# Patient Record
Sex: FEMALE | Race: WHITE | Employment: OTHER | URBAN - METROPOLITAN AREA
[De-identification: names, ages, dates, MRNs, and addresses within clinical notes are randomized per-mention and may not be internally consistent; named-entity substitution may affect disease eponyms.]

---

## 2017-02-20 RX ORDER — NAPROXEN 500 MG/1
TABLET ORAL
Qty: 60 TABLET | Refills: 5 | Status: SHIPPED | OUTPATIENT
Start: 2017-02-20 | End: 2017-02-22 | Stop reason: SDUPTHER

## 2017-02-22 ENCOUNTER — OFFICE VISIT (OUTPATIENT)
Dept: PRIMARY CARE CLINIC | Age: 67
End: 2017-02-22

## 2017-02-22 VITALS
TEMPERATURE: 97.7 F | SYSTOLIC BLOOD PRESSURE: 132 MMHG | RESPIRATION RATE: 14 BRPM | DIASTOLIC BLOOD PRESSURE: 70 MMHG | WEIGHT: 163.7 LBS | HEIGHT: 64 IN | BODY MASS INDEX: 27.95 KG/M2 | HEART RATE: 76 BPM

## 2017-02-22 DIAGNOSIS — M13.0 ARTHRITIS OF MULTIPLE SITES: ICD-10-CM

## 2017-02-22 DIAGNOSIS — F40.243 FEAR OF FLYING: ICD-10-CM

## 2017-02-22 DIAGNOSIS — J00 ACUTE NASOPHARYNGITIS: Primary | ICD-10-CM

## 2017-02-22 PROCEDURE — 3014F SCREEN MAMMO DOC REV: CPT | Performed by: INTERNAL MEDICINE

## 2017-02-22 PROCEDURE — 4040F PNEUMOC VAC/ADMIN/RCVD: CPT | Performed by: INTERNAL MEDICINE

## 2017-02-22 PROCEDURE — G8399 PT W/DXA RESULTS DOCUMENT: HCPCS | Performed by: INTERNAL MEDICINE

## 2017-02-22 PROCEDURE — G8420 CALC BMI NORM PARAMETERS: HCPCS | Performed by: INTERNAL MEDICINE

## 2017-02-22 PROCEDURE — 3017F COLORECTAL CA SCREEN DOC REV: CPT | Performed by: INTERNAL MEDICINE

## 2017-02-22 PROCEDURE — G8427 DOCREV CUR MEDS BY ELIG CLIN: HCPCS | Performed by: INTERNAL MEDICINE

## 2017-02-22 PROCEDURE — 1090F PRES/ABSN URINE INCON ASSESS: CPT | Performed by: INTERNAL MEDICINE

## 2017-02-22 PROCEDURE — 1036F TOBACCO NON-USER: CPT | Performed by: INTERNAL MEDICINE

## 2017-02-22 PROCEDURE — 99213 OFFICE O/P EST LOW 20 MIN: CPT | Performed by: INTERNAL MEDICINE

## 2017-02-22 PROCEDURE — G8484 FLU IMMUNIZE NO ADMIN: HCPCS | Performed by: INTERNAL MEDICINE

## 2017-02-22 PROCEDURE — 1123F ACP DISCUSS/DSCN MKR DOCD: CPT | Performed by: INTERNAL MEDICINE

## 2017-02-22 RX ORDER — AMOXICILLIN AND CLAVULANATE POTASSIUM 875; 125 MG/1; MG/1
1 TABLET, FILM COATED ORAL 2 TIMES DAILY
Qty: 20 TABLET | Refills: 1 | Status: SHIPPED | OUTPATIENT
Start: 2017-02-22 | End: 2018-10-19 | Stop reason: SDUPTHER

## 2017-02-22 RX ORDER — LORAZEPAM 0.5 MG/1
0.5 TABLET ORAL EVERY 6 HOURS PRN
Qty: 20 TABLET | Refills: 0 | Status: SHIPPED | OUTPATIENT
Start: 2017-02-22 | End: 2018-03-08 | Stop reason: ALTCHOICE

## 2017-02-22 RX ORDER — NAPROXEN 500 MG/1
TABLET ORAL
Qty: 60 TABLET | Refills: 5 | Status: SHIPPED | OUTPATIENT
Start: 2017-02-22 | End: 2018-04-03 | Stop reason: SDUPTHER

## 2017-03-05 ASSESSMENT — ENCOUNTER SYMPTOMS
SINUS PAIN: 1
ABDOMINAL PAIN: 0
COUGH: 0
BLOOD IN STOOL: 0
PHOTOPHOBIA: 0
CHOKING: 0
APNEA: 0
FACIAL SWELLING: 0
RHINORRHEA: 1
ABDOMINAL DISTENTION: 0

## 2017-06-12 ENCOUNTER — OFFICE VISIT (OUTPATIENT)
Dept: PRIMARY CARE CLINIC | Age: 67
End: 2017-06-12

## 2017-06-12 VITALS
BODY MASS INDEX: 26.98 KG/M2 | OXYGEN SATURATION: 97 % | HEIGHT: 64 IN | HEART RATE: 60 BPM | WEIGHT: 158 LBS | DIASTOLIC BLOOD PRESSURE: 60 MMHG | RESPIRATION RATE: 14 BRPM | TEMPERATURE: 97.7 F | SYSTOLIC BLOOD PRESSURE: 110 MMHG

## 2017-06-12 DIAGNOSIS — R35.0 FREQUENT URINATION: ICD-10-CM

## 2017-06-12 DIAGNOSIS — R10.9 LEFT SIDED ABDOMINAL PAIN: ICD-10-CM

## 2017-06-12 DIAGNOSIS — M54.50 ACUTE BILATERAL LOW BACK PAIN WITHOUT SCIATICA: Primary | ICD-10-CM

## 2017-06-12 DIAGNOSIS — K80.21 CALCULUS OF GALLBLADDER WITH BILIARY OBSTRUCTION BUT WITHOUT CHOLECYSTITIS: ICD-10-CM

## 2017-06-12 LAB
ALBUMIN SERPL-MCNC: 4.1 G/DL (ref 3.9–4.9)
ALP BLD-CCNC: 89 U/L (ref 40–130)
ALT SERPL-CCNC: 14 U/L (ref 0–33)
AMYLASE: 72 U/L (ref 28–100)
ANION GAP SERPL CALCULATED.3IONS-SCNC: 12 MEQ/L (ref 7–13)
AST SERPL-CCNC: 12 U/L (ref 0–35)
BASOPHILS ABSOLUTE: 0.1 K/UL (ref 0–0.2)
BASOPHILS RELATIVE PERCENT: 1 %
BILIRUB SERPL-MCNC: 0.4 MG/DL (ref 0–1.2)
BILIRUBIN, POC: NORMAL
BLOOD URINE, POC: NORMAL
BUN BLDV-MCNC: 29 MG/DL (ref 8–23)
CALCIUM SERPL-MCNC: 10.8 MG/DL (ref 8.6–10.2)
CHLORIDE BLD-SCNC: 102 MEQ/L (ref 98–107)
CLARITY, POC: CLEAR
CO2: 28 MEQ/L (ref 22–29)
COLOR, POC: NORMAL
CREAT SERPL-MCNC: 0.72 MG/DL (ref 0.5–0.9)
EOSINOPHILS ABSOLUTE: 0.7 K/UL (ref 0–0.7)
EOSINOPHILS RELATIVE PERCENT: 6 %
GFR AFRICAN AMERICAN: >60
GFR NON-AFRICAN AMERICAN: >60
GLOBULIN: 2.3 G/DL (ref 2.3–3.5)
GLUCOSE BLD-MCNC: 71 MG/DL (ref 74–109)
GLUCOSE URINE, POC: NORMAL
HCT VFR BLD CALC: 45.7 % (ref 37–47)
HEMOGLOBIN: 15.3 G/DL (ref 12–16)
KETONES, POC: NORMAL
LEUKOCYTE EST, POC: NORMAL
LIPASE: 35 U/L (ref 13–60)
LYMPHOCYTES ABSOLUTE: 2.3 K/UL (ref 1–4.8)
LYMPHOCYTES RELATIVE PERCENT: 21.1 %
MCH RBC QN AUTO: 29.4 PG (ref 27–31.3)
MCHC RBC AUTO-ENTMCNC: 33.6 % (ref 33–37)
MCV RBC AUTO: 87.5 FL (ref 82–100)
MONOCYTES ABSOLUTE: 0.8 K/UL (ref 0.2–0.8)
MONOCYTES RELATIVE PERCENT: 7.3 %
NEUTROPHILS ABSOLUTE: 7.2 K/UL (ref 1.4–6.5)
NEUTROPHILS RELATIVE PERCENT: 64.6 %
NITRITE, POC: NORMAL
PDW BLD-RTO: 13.4 % (ref 11.5–14.5)
PH, POC: 6
PLATELET # BLD: 291 K/UL (ref 130–400)
POTASSIUM SERPL-SCNC: 4.6 MEQ/L (ref 3.5–5.1)
PROTEIN, POC: NORMAL
RBC # BLD: 5.22 M/UL (ref 4.2–5.4)
SODIUM BLD-SCNC: 142 MEQ/L (ref 132–144)
SPECIFIC GRAVITY, POC: 1.03
TOTAL PROTEIN: 6.4 G/DL (ref 6.4–8.1)
UROBILINOGEN, POC: NORMAL
WBC # BLD: 11.1 K/UL (ref 4.8–10.8)

## 2017-06-12 PROCEDURE — 81003 URINALYSIS AUTO W/O SCOPE: CPT | Performed by: INTERNAL MEDICINE

## 2017-06-12 PROCEDURE — 3014F SCREEN MAMMO DOC REV: CPT | Performed by: INTERNAL MEDICINE

## 2017-06-12 PROCEDURE — 99214 OFFICE O/P EST MOD 30 MIN: CPT | Performed by: INTERNAL MEDICINE

## 2017-06-12 PROCEDURE — G8419 CALC BMI OUT NRM PARAM NOF/U: HCPCS | Performed by: INTERNAL MEDICINE

## 2017-06-12 PROCEDURE — 4040F PNEUMOC VAC/ADMIN/RCVD: CPT | Performed by: INTERNAL MEDICINE

## 2017-06-12 PROCEDURE — 3017F COLORECTAL CA SCREEN DOC REV: CPT | Performed by: INTERNAL MEDICINE

## 2017-06-12 PROCEDURE — 1036F TOBACCO NON-USER: CPT | Performed by: INTERNAL MEDICINE

## 2017-06-12 PROCEDURE — G8399 PT W/DXA RESULTS DOCUMENT: HCPCS | Performed by: INTERNAL MEDICINE

## 2017-06-12 PROCEDURE — G8427 DOCREV CUR MEDS BY ELIG CLIN: HCPCS | Performed by: INTERNAL MEDICINE

## 2017-06-12 PROCEDURE — 1090F PRES/ABSN URINE INCON ASSESS: CPT | Performed by: INTERNAL MEDICINE

## 2017-06-12 PROCEDURE — 1123F ACP DISCUSS/DSCN MKR DOCD: CPT | Performed by: INTERNAL MEDICINE

## 2017-06-12 ASSESSMENT — PATIENT HEALTH QUESTIONNAIRE - PHQ9
SUM OF ALL RESPONSES TO PHQ QUESTIONS 1-9: 0
2. FEELING DOWN, DEPRESSED OR HOPELESS: 0
1. LITTLE INTEREST OR PLEASURE IN DOING THINGS: 0
SUM OF ALL RESPONSES TO PHQ9 QUESTIONS 1 & 2: 0

## 2017-06-13 ENCOUNTER — TELEPHONE (OUTPATIENT)
Dept: PRIMARY CARE CLINIC | Age: 67
End: 2017-06-13

## 2017-06-18 ASSESSMENT — ENCOUNTER SYMPTOMS
PHOTOPHOBIA: 0
ABDOMINAL PAIN: 1
FACIAL SWELLING: 0
CHOKING: 0
APNEA: 0
ABDOMINAL DISTENTION: 0
BLOOD IN STOOL: 0
BACK PAIN: 1

## 2017-06-19 ENCOUNTER — HOSPITAL ENCOUNTER (OUTPATIENT)
Dept: ULTRASOUND IMAGING | Age: 67
Discharge: HOME | End: 2017-06-19
Payer: MEDICARE

## 2017-06-19 ENCOUNTER — HOSPITAL ENCOUNTER (OUTPATIENT)
Dept: CT IMAGING | Age: 67
Discharge: HOME | End: 2017-06-19
Payer: MEDICARE

## 2017-06-19 VITALS — WEIGHT: 158 LBS | BODY MASS INDEX: 27.12 KG/M2

## 2017-06-19 DIAGNOSIS — R10.9 LEFT SIDED ABDOMINAL PAIN: ICD-10-CM

## 2017-06-19 DIAGNOSIS — R35.0 FREQUENT URINATION: ICD-10-CM

## 2017-06-19 DIAGNOSIS — K80.21 CALCULUS OF GALLBLADDER WITH BILIARY OBSTRUCTION BUT WITHOUT CHOLECYSTITIS: ICD-10-CM

## 2017-06-19 PROCEDURE — 74176 CT ABD & PELVIS W/O CONTRAST: CPT

## 2017-06-19 PROCEDURE — 76705 ECHO EXAM OF ABDOMEN: CPT

## 2017-06-20 DIAGNOSIS — D35.01 ADRENAL ADENOMA, RIGHT: Primary | ICD-10-CM

## 2017-06-20 DIAGNOSIS — K40.90 INGUINAL HERNIA WITHOUT OBSTRUCTION OR GANGRENE, RECURRENCE NOT SPECIFIED, UNSPECIFIED LATERALITY: ICD-10-CM

## 2017-06-20 DIAGNOSIS — K76.89 LIVER CYST: Primary | ICD-10-CM

## 2017-07-10 DIAGNOSIS — I10 ESSENTIAL HYPERTENSION: ICD-10-CM

## 2017-07-10 RX ORDER — CARVEDILOL 25 MG/1
TABLET ORAL
Qty: 180 TABLET | Refills: 2 | Status: SHIPPED | OUTPATIENT
Start: 2017-07-10 | End: 2018-04-03 | Stop reason: SDUPTHER

## 2017-07-10 RX ORDER — LOSARTAN POTASSIUM AND HYDROCHLOROTHIAZIDE 12.5; 5 MG/1; MG/1
TABLET ORAL
Qty: 90 TABLET | Refills: 1 | Status: SHIPPED | OUTPATIENT
Start: 2017-07-10 | End: 2018-03-12 | Stop reason: SDUPTHER

## 2017-07-13 ENCOUNTER — OFFICE VISIT (OUTPATIENT)
Dept: SURGERY | Age: 67
End: 2017-07-13

## 2017-07-13 VITALS
HEART RATE: 74 BPM | TEMPERATURE: 98.4 F | SYSTOLIC BLOOD PRESSURE: 110 MMHG | BODY MASS INDEX: 27.05 KG/M2 | RESPIRATION RATE: 16 BRPM | WEIGHT: 157.6 LBS | DIASTOLIC BLOOD PRESSURE: 72 MMHG

## 2017-07-13 DIAGNOSIS — K40.20 BILATERAL INGUINAL HERNIA WITHOUT OBSTRUCTION OR GANGRENE, RECURRENCE NOT SPECIFIED: Primary | ICD-10-CM

## 2017-07-13 PROCEDURE — 3017F COLORECTAL CA SCREEN DOC REV: CPT | Performed by: SURGERY

## 2017-07-13 PROCEDURE — 4040F PNEUMOC VAC/ADMIN/RCVD: CPT | Performed by: SURGERY

## 2017-07-13 PROCEDURE — G8399 PT W/DXA RESULTS DOCUMENT: HCPCS | Performed by: SURGERY

## 2017-07-13 PROCEDURE — 1090F PRES/ABSN URINE INCON ASSESS: CPT | Performed by: SURGERY

## 2017-07-13 PROCEDURE — 99202 OFFICE O/P NEW SF 15 MIN: CPT | Performed by: SURGERY

## 2017-07-13 PROCEDURE — G8427 DOCREV CUR MEDS BY ELIG CLIN: HCPCS | Performed by: SURGERY

## 2017-07-13 PROCEDURE — 1036F TOBACCO NON-USER: CPT | Performed by: SURGERY

## 2017-07-13 PROCEDURE — 1123F ACP DISCUSS/DSCN MKR DOCD: CPT | Performed by: SURGERY

## 2017-07-13 PROCEDURE — 3014F SCREEN MAMMO DOC REV: CPT | Performed by: SURGERY

## 2017-07-13 PROCEDURE — G8419 CALC BMI OUT NRM PARAM NOF/U: HCPCS | Performed by: SURGERY

## 2017-07-13 ASSESSMENT — ENCOUNTER SYMPTOMS
BACK PAIN: 1
ABDOMINAL PAIN: 1
SHORTNESS OF BREATH: 0
NAUSEA: 0
CHEST TIGHTNESS: 0
ALLERGIC/IMMUNOLOGIC NEGATIVE: 1
RHINORRHEA: 0
RESPIRATORY NEGATIVE: 1
ABDOMINAL DISTENTION: 0
COLOR CHANGE: 0
EYES NEGATIVE: 1
BLOOD IN STOOL: 0
RECTAL PAIN: 0

## 2017-07-18 DIAGNOSIS — K21.9 GASTROESOPHAGEAL REFLUX DISEASE WITHOUT ESOPHAGITIS: ICD-10-CM

## 2017-07-18 RX ORDER — ESOMEPRAZOLE MAGNESIUM 40 MG/1
CAPSULE, DELAYED RELEASE ORAL
Qty: 90 CAPSULE | Refills: 1 | Status: SHIPPED | OUTPATIENT
Start: 2017-07-18 | End: 2018-03-08

## 2017-10-09 DIAGNOSIS — Z12.31 ENCOUNTER FOR SCREENING MAMMOGRAM FOR MALIGNANT NEOPLASM OF BREAST: ICD-10-CM

## 2017-10-09 DIAGNOSIS — Z12.39 SCREENING FOR BREAST CANCER: Primary | ICD-10-CM

## 2018-03-08 ENCOUNTER — OFFICE VISIT (OUTPATIENT)
Dept: CARDIOLOGY CLINIC | Age: 68
End: 2018-03-08
Payer: MEDICARE

## 2018-03-08 VITALS
DIASTOLIC BLOOD PRESSURE: 82 MMHG | WEIGHT: 156 LBS | RESPIRATION RATE: 16 BRPM | OXYGEN SATURATION: 97 % | BODY MASS INDEX: 26.78 KG/M2 | HEART RATE: 75 BPM | SYSTOLIC BLOOD PRESSURE: 138 MMHG

## 2018-03-08 DIAGNOSIS — I10 ESSENTIAL HYPERTENSION: Primary | ICD-10-CM

## 2018-03-08 DIAGNOSIS — F32.0 MILD SINGLE CURRENT EPISODE OF MAJOR DEPRESSIVE DISORDER (HCC): ICD-10-CM

## 2018-03-08 DIAGNOSIS — R42 DIZZINESS: ICD-10-CM

## 2018-03-08 PROCEDURE — 3017F COLORECTAL CA SCREEN DOC REV: CPT | Performed by: INTERNAL MEDICINE

## 2018-03-08 PROCEDURE — G8484 FLU IMMUNIZE NO ADMIN: HCPCS | Performed by: INTERNAL MEDICINE

## 2018-03-08 PROCEDURE — 1123F ACP DISCUSS/DSCN MKR DOCD: CPT | Performed by: INTERNAL MEDICINE

## 2018-03-08 PROCEDURE — 1090F PRES/ABSN URINE INCON ASSESS: CPT | Performed by: INTERNAL MEDICINE

## 2018-03-08 PROCEDURE — G8427 DOCREV CUR MEDS BY ELIG CLIN: HCPCS | Performed by: INTERNAL MEDICINE

## 2018-03-08 PROCEDURE — G8419 CALC BMI OUT NRM PARAM NOF/U: HCPCS | Performed by: INTERNAL MEDICINE

## 2018-03-08 PROCEDURE — 99213 OFFICE O/P EST LOW 20 MIN: CPT | Performed by: INTERNAL MEDICINE

## 2018-03-08 PROCEDURE — 4040F PNEUMOC VAC/ADMIN/RCVD: CPT | Performed by: INTERNAL MEDICINE

## 2018-03-08 PROCEDURE — 1036F TOBACCO NON-USER: CPT | Performed by: INTERNAL MEDICINE

## 2018-03-08 PROCEDURE — G8399 PT W/DXA RESULTS DOCUMENT: HCPCS | Performed by: INTERNAL MEDICINE

## 2018-03-08 PROCEDURE — 3014F SCREEN MAMMO DOC REV: CPT | Performed by: INTERNAL MEDICINE

## 2018-03-08 ASSESSMENT — ENCOUNTER SYMPTOMS
CHEST TIGHTNESS: 0
APNEA: 0
SHORTNESS OF BREATH: 0

## 2018-03-08 NOTE — PROGRESS NOTES
FINGER REPLANTATION      right thumb implant due to arthritis    HYSTERECTOMY  1993    KNEE ARTHROSCOPY      bilateral    LOBECTOMY  1974    lower back    NOSE SURGERY      sinus    TUBAL LIGATION      TUMOR REMOVAL  1973    2x on back side of neck       Family History:  She was adopted. Family history is unknown by patient. Current Medications:    Current Outpatient Prescriptions:     RaNITidine HCl (ZANTAC 150 MAXIMUM STRENGTH PO), Take 150 mg by mouth 2 times daily, Disp: , Rfl:     PARoxetine (PAXIL) 10 MG tablet, Take 1 tab twice daily. , Disp: 90 tablet, Rfl: 3    losartan-hydrochlorothiazide (HYZAAR) 50-12.5 MG per tablet, TAKE ONE TABLET BY MOUTH EVERY DAY, Disp: 90 tablet, Rfl: 1    carvedilol (COREG) 25 MG tablet, TAKE ONE TABLET BY MOUTH TWO TIMES A DAY, Disp: 180 tablet, Rfl: 2    traMADol-acetaminophen (ULTRACET) 37.5-325 MG per tablet, TAKE ONE TABLET BY MOUTH EVERY 6 HOURS AS NEEDED, Disp: 120 tablet, Rfl: 0    naproxen (NAPROSYN) 500 MG tablet, TAKE ONE TABLET BY MOUTH TWICE A DAY WITH MEALS, Disp: 60 tablet, Rfl: 5    Multiple Vitamins-Minerals (ONE-A-DAY 50 PLUS PO), Take by mouth, Disp: , Rfl:     Loratadine (CLARITIN PO), Take by mouth nightly, Disp: , Rfl:       Review of Systems:  Review of Systems   Constitutional: Negative for appetite change and fatigue. Respiratory: Negative for apnea, chest tightness and shortness of breath. Cardiovascular: Positive for palpitations. Negative for chest pain and leg swelling. Neurological: Negative for dizziness, syncope, weakness, light-headedness and headaches. Hematological: Does not bruise/bleed easily. Psychiatric/Behavioral: Negative for agitation, behavioral problems and confusion. The patient is not nervous/anxious and is not hyperactive. All other systems reviewed and are negative.         Objective  Vitals:    03/08/18 1205   BP: 138/82   Site: Left Arm   Position: Sitting   Cuff Size: Medium Adult   Pulse: 75

## 2018-03-12 DIAGNOSIS — I10 ESSENTIAL HYPERTENSION: ICD-10-CM

## 2018-03-12 RX ORDER — LOSARTAN POTASSIUM AND HYDROCHLOROTHIAZIDE 12.5; 5 MG/1; MG/1
TABLET ORAL
Qty: 90 TABLET | Refills: 2 | Status: SHIPPED | OUTPATIENT
Start: 2018-03-12 | End: 2018-07-03 | Stop reason: SDUPTHER

## 2018-03-12 RX ORDER — LOSARTAN POTASSIUM AND HYDROCHLOROTHIAZIDE 12.5; 5 MG/1; MG/1
TABLET ORAL
Qty: 90 TABLET | Refills: 0 | OUTPATIENT
Start: 2018-03-12

## 2018-04-03 ENCOUNTER — OFFICE VISIT (OUTPATIENT)
Dept: PRIMARY CARE CLINIC | Age: 68
End: 2018-04-03
Payer: MEDICARE

## 2018-04-03 VITALS
TEMPERATURE: 97.6 F | SYSTOLIC BLOOD PRESSURE: 130 MMHG | DIASTOLIC BLOOD PRESSURE: 80 MMHG | RESPIRATION RATE: 16 BRPM | HEIGHT: 64 IN | BODY MASS INDEX: 26.89 KG/M2 | HEART RATE: 62 BPM | WEIGHT: 157.5 LBS | OXYGEN SATURATION: 98 %

## 2018-04-03 DIAGNOSIS — M54.50 LOW BACK PAIN RADIATING DOWN LEG: ICD-10-CM

## 2018-04-03 DIAGNOSIS — R23.2 HOT FLASHES: ICD-10-CM

## 2018-04-03 DIAGNOSIS — M25.512 CHRONIC PAIN OF BOTH SHOULDERS: ICD-10-CM

## 2018-04-03 DIAGNOSIS — I10 ESSENTIAL HYPERTENSION: ICD-10-CM

## 2018-04-03 DIAGNOSIS — M13.0 ARTHRITIS OF MULTIPLE SITES: ICD-10-CM

## 2018-04-03 DIAGNOSIS — M54.2 NECK PAIN: ICD-10-CM

## 2018-04-03 DIAGNOSIS — G89.29 CHRONIC PAIN OF BOTH SHOULDERS: ICD-10-CM

## 2018-04-03 DIAGNOSIS — M25.511 CHRONIC PAIN OF BOTH SHOULDERS: ICD-10-CM

## 2018-04-03 DIAGNOSIS — M79.606 LOW BACK PAIN RADIATING DOWN LEG: ICD-10-CM

## 2018-04-03 DIAGNOSIS — Z00.00 ROUTINE GENERAL MEDICAL EXAMINATION AT A HEALTH CARE FACILITY: Primary | ICD-10-CM

## 2018-04-03 PROCEDURE — G0438 PPPS, INITIAL VISIT: HCPCS | Performed by: INTERNAL MEDICINE

## 2018-04-03 RX ORDER — NAPROXEN 500 MG/1
TABLET ORAL
Qty: 180 TABLET | Refills: 3 | Status: SHIPPED | OUTPATIENT
Start: 2018-04-03 | End: 2019-09-05

## 2018-04-03 RX ORDER — PAROXETINE 10 MG/1
TABLET, FILM COATED ORAL
Qty: 90 TABLET | Refills: 3 | Status: SHIPPED | OUTPATIENT
Start: 2018-04-03 | End: 2018-07-03 | Stop reason: SDUPTHER

## 2018-04-03 RX ORDER — CARVEDILOL 25 MG/1
25 TABLET ORAL 2 TIMES DAILY WITH MEALS
Qty: 180 TABLET | Refills: 3 | Status: SHIPPED | OUTPATIENT
Start: 2018-04-03 | End: 2018-05-02 | Stop reason: SDUPTHER

## 2018-04-03 ASSESSMENT — LIFESTYLE VARIABLES
HOW MANY STANDARD DRINKS CONTAINING ALCOHOL DO YOU HAVE ON A TYPICAL DAY: 0
HAS A RELATIVE, FRIEND, DOCTOR, OR ANOTHER HEALTH PROFESSIONAL EXPRESSED CONCERN ABOUT YOUR DRINKING OR SUGGESTED YOU CUT DOWN: 0
HOW OFTEN DURING THE LAST YEAR HAVE YOU BEEN UNABLE TO REMEMBER WHAT HAPPENED THE NIGHT BEFORE BECAUSE YOU HAD BEEN DRINKING: 0
HOW OFTEN DURING THE LAST YEAR HAVE YOU HAD A FEELING OF GUILT OR REMORSE AFTER DRINKING: 0
HOW OFTEN DO YOU HAVE SIX OR MORE DRINKS ON ONE OCCASION: 0
AUDIT TOTAL SCORE: 1
HOW OFTEN DURING THE LAST YEAR HAVE YOU FAILED TO DO WHAT WAS NORMALLY EXPECTED FROM YOU BECAUSE OF DRINKING: 0
AUDIT-C TOTAL SCORE: 1
HOW OFTEN DURING THE LAST YEAR HAVE YOU FOUND THAT YOU WERE NOT ABLE TO STOP DRINKING ONCE YOU HAD STARTED: 0
HOW OFTEN DO YOU HAVE A DRINK CONTAINING ALCOHOL: 1
HOW OFTEN DURING THE LAST YEAR HAVE YOU NEEDED AN ALCOHOLIC DRINK FIRST THING IN THE MORNING TO GET YOURSELF GOING AFTER A NIGHT OF HEAVY DRINKING: 0
HAVE YOU OR SOMEONE ELSE BEEN INJURED AS A RESULT OF YOUR DRINKING: 0

## 2018-04-03 ASSESSMENT — PATIENT HEALTH QUESTIONNAIRE - PHQ9: SUM OF ALL RESPONSES TO PHQ QUESTIONS 1-9: 2

## 2018-04-03 ASSESSMENT — ANXIETY QUESTIONNAIRES: GAD7 TOTAL SCORE: 2

## 2018-05-02 RX ORDER — CARVEDILOL 25 MG/1
TABLET ORAL
Qty: 180 TABLET | Refills: 3 | Status: SHIPPED | OUTPATIENT
Start: 2018-05-02 | End: 2018-07-03 | Stop reason: SDUPTHER

## 2018-05-08 ENCOUNTER — TELEPHONE (OUTPATIENT)
Dept: PRIMARY CARE CLINIC | Age: 68
End: 2018-05-08

## 2018-07-03 ENCOUNTER — OFFICE VISIT (OUTPATIENT)
Dept: PRIMARY CARE CLINIC | Age: 68
End: 2018-07-03
Payer: MEDICARE

## 2018-07-03 VITALS
HEART RATE: 64 BPM | WEIGHT: 155 LBS | DIASTOLIC BLOOD PRESSURE: 74 MMHG | RESPIRATION RATE: 14 BRPM | BODY MASS INDEX: 26.46 KG/M2 | HEIGHT: 64 IN | SYSTOLIC BLOOD PRESSURE: 130 MMHG

## 2018-07-03 DIAGNOSIS — Z11.59 ENCOUNTER FOR HEPATITIS C SCREENING TEST FOR LOW RISK PATIENT: ICD-10-CM

## 2018-07-03 DIAGNOSIS — M79.642 HAND PAIN, LEFT: ICD-10-CM

## 2018-07-03 DIAGNOSIS — E03.8 OTHER SPECIFIED HYPOTHYROIDISM: ICD-10-CM

## 2018-07-03 DIAGNOSIS — R23.2 HOT FLASHES: ICD-10-CM

## 2018-07-03 DIAGNOSIS — I10 ESSENTIAL HYPERTENSION: Primary | ICD-10-CM

## 2018-07-03 DIAGNOSIS — I10 ESSENTIAL HYPERTENSION: ICD-10-CM

## 2018-07-03 LAB
ALBUMIN SERPL-MCNC: 4.3 G/DL (ref 3.9–4.9)
ALP BLD-CCNC: 88 U/L (ref 40–130)
ALT SERPL-CCNC: 12 U/L (ref 0–33)
ANION GAP SERPL CALCULATED.3IONS-SCNC: 15 MEQ/L (ref 7–13)
AST SERPL-CCNC: 16 U/L (ref 0–35)
BASOPHILS ABSOLUTE: 0.1 K/UL (ref 0–0.2)
BASOPHILS RELATIVE PERCENT: 0.6 %
BILIRUB SERPL-MCNC: 0.5 MG/DL (ref 0–1.2)
BUN BLDV-MCNC: 19 MG/DL (ref 8–23)
CALCIUM SERPL-MCNC: 10.4 MG/DL (ref 8.6–10.2)
CHLORIDE BLD-SCNC: 103 MEQ/L (ref 98–107)
CO2: 26 MEQ/L (ref 22–29)
CREAT SERPL-MCNC: 0.5 MG/DL (ref 0.5–0.9)
EOSINOPHILS ABSOLUTE: 0.4 K/UL (ref 0–0.7)
EOSINOPHILS RELATIVE PERCENT: 4.2 %
GFR AFRICAN AMERICAN: >60
GFR NON-AFRICAN AMERICAN: >60
GLOBULIN: 2.4 G/DL (ref 2.3–3.5)
GLUCOSE BLD-MCNC: 51 MG/DL (ref 74–109)
HCT VFR BLD CALC: 44.4 % (ref 37–47)
HEMOGLOBIN: 15.6 G/DL (ref 12–16)
HEPATITIS C ANTIBODY INTERPRETATION: NORMAL
LYMPHOCYTES ABSOLUTE: 1.9 K/UL (ref 1–4.8)
LYMPHOCYTES RELATIVE PERCENT: 20.4 %
MCH RBC QN AUTO: 31.3 PG (ref 27–31.3)
MCHC RBC AUTO-ENTMCNC: 35.1 % (ref 33–37)
MCV RBC AUTO: 89.2 FL (ref 82–100)
MONOCYTES ABSOLUTE: 0.6 K/UL (ref 0.2–0.8)
MONOCYTES RELATIVE PERCENT: 6.1 %
NEUTROPHILS ABSOLUTE: 6.5 K/UL (ref 1.4–6.5)
NEUTROPHILS RELATIVE PERCENT: 68.7 %
PDW BLD-RTO: 13.5 % (ref 11.5–14.5)
PLATELET # BLD: 283 K/UL (ref 130–400)
POTASSIUM SERPL-SCNC: 3.6 MEQ/L (ref 3.5–5.1)
RBC # BLD: 4.98 M/UL (ref 4.2–5.4)
SODIUM BLD-SCNC: 144 MEQ/L (ref 132–144)
TOTAL PROTEIN: 6.7 G/DL (ref 6.4–8.1)
TSH SERPL DL<=0.05 MIU/L-ACNC: 0.05 UIU/ML (ref 0.27–4.2)
WBC # BLD: 9.5 K/UL (ref 4.8–10.8)

## 2018-07-03 PROCEDURE — 99214 OFFICE O/P EST MOD 30 MIN: CPT | Performed by: INTERNAL MEDICINE

## 2018-07-03 RX ORDER — LOSARTAN POTASSIUM AND HYDROCHLOROTHIAZIDE 12.5; 5 MG/1; MG/1
TABLET ORAL
Qty: 90 TABLET | Refills: 3 | Status: SHIPPED | OUTPATIENT
Start: 2018-07-03 | End: 2020-01-22

## 2018-07-03 RX ORDER — CARVEDILOL 25 MG/1
25 TABLET ORAL 2 TIMES DAILY
Qty: 180 TABLET | Refills: 3 | Status: SHIPPED | OUTPATIENT
Start: 2018-07-03 | End: 2018-09-25 | Stop reason: ALTCHOICE

## 2018-07-03 RX ORDER — PAROXETINE 10 MG/1
TABLET, FILM COATED ORAL
Qty: 90 TABLET | Refills: 3 | Status: SHIPPED | OUTPATIENT
Start: 2018-07-03 | End: 2019-06-19 | Stop reason: SDUPTHER

## 2018-07-03 NOTE — PROGRESS NOTES
Mary Alice Saldana 77 y.o. female presents today with   Chief Complaint   Patient presents with    Hypertension     Pt taking losartan, needs a refill. Pt taking Paxil, needs a refill.  Arthritis     Pt admits to numbness in the left pain for a year and a half to two years, Pt wants an X-Ray. Hypertension   This is a chronic problem. The current episode started more than 1 year ago. The problem is unchanged. The problem is controlled. Associated symptoms include anxiety. Pertinent negatives include no chest pain or palpitations. There are no associated agents to hypertension. Hand Pain    The incident occurred more than 1 week ago. The incident occurred at home. There was no injury mechanism. The pain is present in the left hand. The quality of the pain is described as aching. The pain is at a severity of 2/10. The pain is mild. Pertinent negatives include no chest pain.    Hot flash    Past Medical History:   Diagnosis Date    Cancer ([facility]     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    Cough 2016    Depression     Dizziness 2015    Gastritis with hemorrhage     GERD (gastroesophageal reflux disease)     Hypertension     Hypertension     Osteoarthritis     Seasonal allergies 11/3/2016    SOB (shortness of breath) 2015     Patient Active Problem List    Diagnosis Date Noted    Bilateral inguinal hernia without obstruction or gangrene 2017    Cough 2016    Seasonal allergies 2016    Dizziness 2015    SOB (shortness of breath) 2015    Depression 2013    Diverticulosis of large intestine 2013    Gastroesophageal reflux disease 2013    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    Hypertension 2013     Past Surgical History:   Procedure Laterality Date    ABDOMEN SURGERY      CATARACT REMOVAL WITH IMPLANT  2018    bilateral  and      SECTION      FINGER REPLANTATION      right thumb implant due to arthritis    HYSTERECTOMY 1993    KNEE ARTHROSCOPY      bilateral    LOBECTOMY  1974    lower back    NOSE SURGERY      sinus    TUBAL LIGATION      TUMOR REMOVAL  1973    2x on back side of neck     Family History   Problem Relation Age of Onset    Adopted: Yes    Family history unknown: Yes     Social History     Social History    Marital status:      Spouse name: N/A    Number of children: N/A    Years of education: N/A     Social History Main Topics    Smoking status: Former Smoker     Packs/day: 1.00     Years: 21.00    Smokeless tobacco: Never Used    Alcohol use 0.0 oz/week      Comment: occassionally    Drug use: No    Sexual activity: Not Asked     Other Topics Concern    None     Social History Narrative    None     Allergies   Allergen Reactions    Meperidine Other (See Comments)     Upset stomach    Morphine Other (See Comments)     Upset stomach    Percocet [Oxycodone-Acetaminophen]      Upset stomach       Review of Systems   Constitutional: Negative for chills and fever. HENT: Negative for facial swelling and nosebleeds. Eyes: Negative for photophobia and visual disturbance. Respiratory: Negative for apnea and choking. Cardiovascular: Negative for chest pain and palpitations. Gastrointestinal: Negative for abdominal distention and blood in stool. Genitourinary: Negative for enuresis, hematuria and vaginal bleeding. Musculoskeletal: Negative for gait problem and joint swelling. Skin: Negative for rash. Neurological: Negative for syncope and speech difficulty. Hematological: Does not bruise/bleed easily. Psychiatric/Behavioral: Negative for hallucinations and suicidal ideas. Vitals:    07/03/18 1132   BP: 130/74   Site: Right Arm   Position: Sitting   Cuff Size: Large Adult   Pulse: 64   Resp: 14   Weight: 155 lb (70.3 kg)   Height: 5' 4\" (1.626 m)       Physical Exam   Constitutional: She appears well-developed. HENT:   Head: Normocephalic.    Eyes: Conjunctivae and

## 2018-07-10 ASSESSMENT — ENCOUNTER SYMPTOMS
ABDOMINAL DISTENTION: 0
CHOKING: 0
PHOTOPHOBIA: 0
FACIAL SWELLING: 0
APNEA: 0
BLOOD IN STOOL: 0

## 2018-07-14 DIAGNOSIS — E05.90 HYPERTHYROIDISM: Primary | ICD-10-CM

## 2018-08-20 DIAGNOSIS — E05.90 HYPERTHYROIDISM: ICD-10-CM

## 2018-08-20 LAB
T4 FREE: 1.05 NG/DL (ref 0.93–1.7)
TSH SERPL DL<=0.05 MIU/L-ACNC: 0.07 UIU/ML (ref 0.27–4.2)

## 2018-08-21 DIAGNOSIS — E04.9 GOITER: Primary | ICD-10-CM

## 2018-08-24 ENCOUNTER — OFFICE VISIT (OUTPATIENT)
Dept: PRIMARY CARE CLINIC | Age: 68
End: 2018-08-24
Payer: MEDICARE

## 2018-08-24 VITALS
WEIGHT: 155 LBS | DIASTOLIC BLOOD PRESSURE: 78 MMHG | OXYGEN SATURATION: 95 % | TEMPERATURE: 98.1 F | RESPIRATION RATE: 14 BRPM | HEIGHT: 63 IN | HEART RATE: 50 BPM | BODY MASS INDEX: 27.46 KG/M2 | SYSTOLIC BLOOD PRESSURE: 130 MMHG

## 2018-08-24 DIAGNOSIS — F40.243 FEAR OF FLYING: ICD-10-CM

## 2018-08-24 DIAGNOSIS — E05.90 HYPERTHYROIDISM: ICD-10-CM

## 2018-08-24 DIAGNOSIS — D22.9 CHANGE IN MOLE: Primary | ICD-10-CM

## 2018-08-24 PROCEDURE — 99213 OFFICE O/P EST LOW 20 MIN: CPT | Performed by: INTERNAL MEDICINE

## 2018-08-24 RX ORDER — LORAZEPAM 0.5 MG/1
0.5 TABLET ORAL EVERY 6 HOURS PRN
Qty: 10 TABLET | Refills: 0 | Status: SHIPPED | OUTPATIENT
Start: 2018-08-24 | End: 2018-08-29

## 2018-08-24 NOTE — PROGRESS NOTES
Enriquejaswant President 77 y.o. female presents today with   Chief Complaint   Patient presents with    Skin Problem     Pt has a spot on the skin x20 years, Spot is raising and flaking, forming togeather, oozing once and awhile.  Anxiety     Pt has a trip that involves flying would like ativan for the trip. HPI going to fly with anxiety. Mole color darker and bigger a few weeks    Past Medical History:   Diagnosis Date    [facility]     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    Cough 2016    Depression     Dizziness 2015    Gastritis with hemorrhage     GERD (gastroesophageal reflux disease)     Hypertension     Hypertension     Osteoarthritis     Seasonal allergies 11/3/2016    SOB (shortness of breath) 2015     Patient Active Problem List    Diagnosis Date Noted    Bilateral inguinal hernia without obstruction or gangrene 2017    Cough 2016    Seasonal allergies 2016    Dizziness 2015    SOB (shortness of breath) 2015    Depression 2013    Diverticulosis of large intestine 2013    Gastroesophageal reflux disease 2013    [facility] 2013    Hypertension 2013     Past Surgical History:   Procedure Laterality Date    ABDOMEN SURGERY      CATARACT REMOVAL WITH IMPLANT  2018    bilateral  and      SECTION      FINGER REPLANTATION      right thumb implant due to arthritis    HYSTERECTOMY      KNEE ARTHROSCOPY      bilateral    LOBECTOMY  1974    lower back    NOSE SURGERY      sinus    TUBAL LIGATION      TUMOR REMOVAL  1973    2x on back side of neck     Family History   Problem Relation Age of Onset    Adopted:  Yes    Family history unknown: Yes     Social History     Social History    Marital status:      Spouse name: N/A    Number of children: N/A    Years of education: N/A     Social History Main Topics    Smoking status: Former Smoker     Packs/day: 1.00     Years: 21.00 anxiety. Diagnoses and all orders for this visit:    Change in mole  -     Referral To Dermatology - ([facility]) Nerissa Gray MD    Fear of flying  -     LORazepam (ATIVAN) 0.5 MG tablet; Take 1 tablet by mouth every 6 hours as needed for Anxiety for up to 5 days. .    Hyperthyroidism  -     Referral To Unknown External Endocrinology        No Follow-up on file.     Antonino Adame MD

## 2018-08-28 ASSESSMENT — ENCOUNTER SYMPTOMS
PHOTOPHOBIA: 0
ABDOMINAL DISTENTION: 0
APNEA: 0
BLOOD IN STOOL: 0
FACIAL SWELLING: 0
CHOKING: 0

## 2018-09-25 ENCOUNTER — OFFICE VISIT (OUTPATIENT)
Dept: ENDOCRINOLOGY | Age: 68
End: 2018-09-25
Payer: MEDICARE

## 2018-09-25 VITALS
DIASTOLIC BLOOD PRESSURE: 71 MMHG | WEIGHT: 152 LBS | HEIGHT: 63 IN | SYSTOLIC BLOOD PRESSURE: 115 MMHG | HEART RATE: 66 BPM | OXYGEN SATURATION: 95 % | BODY MASS INDEX: 26.93 KG/M2

## 2018-09-25 DIAGNOSIS — G25.2 TREMOR, COARSE: ICD-10-CM

## 2018-09-25 DIAGNOSIS — E05.90 HYPERTHYROIDISM: Primary | ICD-10-CM

## 2018-09-25 DIAGNOSIS — E05.20 GOITER, TOXIC, MULTINODULAR: ICD-10-CM

## 2018-09-25 PROCEDURE — 99203 OFFICE O/P NEW LOW 30 MIN: CPT | Performed by: INTERNAL MEDICINE

## 2018-09-25 RX ORDER — PROPRANOLOL HCL 60 MG
60 CAPSULE, EXTENDED RELEASE 24HR ORAL DAILY
Qty: 30 CAPSULE | Refills: 3 | Status: SHIPPED | OUTPATIENT
Start: 2018-09-25 | End: 2019-01-02 | Stop reason: SDUPTHER

## 2018-09-27 ENCOUNTER — TELEPHONE (OUTPATIENT)
Dept: CARDIOLOGY CLINIC | Age: 68
End: 2018-09-27

## 2018-09-30 ASSESSMENT — ENCOUNTER SYMPTOMS
SHORTNESS OF BREATH: 1
SWOLLEN GLANDS: 0

## 2018-09-30 NOTE — PROGRESS NOTES
 ABDOMEN SURGERY      CATARACT REMOVAL WITH IMPLANT  2018    bilateral  and      SECTION      FINGER REPLANTATION      right thumb implant due to arthritis    HYSTERECTOMY  1993    KNEE ARTHROSCOPY      bilateral    LOBECTOMY  1974    lower back    NOSE SURGERY      sinus    TUBAL LIGATION      TUMOR REMOVAL  1973    2x on back side of neck     Family History   Problem Relation Age of Onset    Adopted: Yes    Family history unknown: Yes     Allergies   Allergen Reactions    Meperidine Other (See Comments)     Upset stomach    Morphine Other (See Comments)     Upset stomach    Percocet [Oxycodone-Acetaminophen]      Upset stomach       Current Outpatient Prescriptions:     propranolol (INDERAL LA) 60 MG extended release capsule, Take 1 capsule by mouth daily, Disp: 30 capsule, Rfl: 3    traMADol-acetaminophen (ULTRACET) 37.5-325 MG per tablet, TAKE ONE TABLET BY MOUTH EVERY 6 HOURS AS NEEDED FOR PAIN FOR UP TO  15 DAYS, Disp: , Rfl: 0    PARoxetine (PAXIL) 10 MG tablet, Take 1 tab twice daily. , Disp: 90 tablet, Rfl: 3    losartan-hydrochlorothiazide (HYZAAR) 50-12.5 MG per tablet, TAKE ONE TABLET BY MOUTH EVERY DAY, Disp: 90 tablet, Rfl: 3    naproxen (NAPROSYN) 500 MG tablet, TAKE ONE TABLET BY MOUTH TWICE A DAY WITH MEALS as needed, Disp: 180 tablet, Rfl: 3    RaNITidine HCl (ZANTAC 150 MAXIMUM STRENGTH PO), Take 150 mg by mouth 2 times daily, Disp: , Rfl:     Loratadine (CLARITIN PO), Take by mouth nightly, Disp: , Rfl:     Review of Systems   Respiratory: Positive for shortness of breath. Cardiovascular: Positive for palpitations. Gastrointestinal: Negative for anorexia. Endocrine: Positive for heat intolerance. Musculoskeletal: Negative for neck pain. Neurological: Positive for tremors. All other systems reviewed and are negative.       Vitals:    18 1508   BP: 115/71   Site: Right Upper Arm   Position: Sitting   Cuff Size: Medium Adult   Pulse: 66 review of labs /radiology and co-ordination of care         Haritha Ledbetter MD

## 2018-10-04 ENCOUNTER — HOSPITAL ENCOUNTER (OUTPATIENT)
Dept: NUCLEAR MEDICINE | Age: 68
Discharge: HOME | End: 2018-10-06
Payer: MEDICARE

## 2018-10-04 DIAGNOSIS — E05.90 HYPERTHYROIDISM: ICD-10-CM

## 2018-10-04 PROCEDURE — 78014 THYROID IMAGING W/BLOOD FLOW: CPT

## 2018-10-04 PROCEDURE — A9516 IODINE I-123 SOD IODIDE MIC: HCPCS | Performed by: INTERNAL MEDICINE

## 2018-10-04 PROCEDURE — 3430000000 HC RX DIAGNOSTIC RADIOPHARMACEUTICAL: Performed by: INTERNAL MEDICINE

## 2018-10-04 RX ADMIN — Medication 238 MICRO CURIE: at 11:10

## 2018-10-18 DIAGNOSIS — Z12.31 SCREENING MAMMOGRAM, ENCOUNTER FOR: Primary | ICD-10-CM

## 2018-10-19 DIAGNOSIS — J00 ACUTE NASOPHARYNGITIS: ICD-10-CM

## 2018-10-19 RX ORDER — AMOXICILLIN AND CLAVULANATE POTASSIUM 875; 125 MG/1; MG/1
1 TABLET, FILM COATED ORAL 2 TIMES DAILY
Qty: 20 TABLET | Refills: 1 | Status: SHIPPED | OUTPATIENT
Start: 2018-10-19 | End: 2019-03-27 | Stop reason: SDUPTHER

## 2018-10-25 ENCOUNTER — OFFICE VISIT (OUTPATIENT)
Dept: ENDOCRINOLOGY | Age: 68
End: 2018-10-25
Payer: MEDICARE

## 2018-10-25 ENCOUNTER — INITIAL CONSULT (OUTPATIENT)
Dept: INTERVENTIONAL RADIOLOGY/VASCULAR | Age: 68
End: 2018-10-25
Payer: MEDICARE

## 2018-10-25 VITALS
BODY MASS INDEX: 27.07 KG/M2 | WEIGHT: 152.8 LBS | SYSTOLIC BLOOD PRESSURE: 136 MMHG | DIASTOLIC BLOOD PRESSURE: 78 MMHG | RESPIRATION RATE: 14 BRPM | HEART RATE: 63 BPM | OXYGEN SATURATION: 96 %

## 2018-10-25 VITALS
DIASTOLIC BLOOD PRESSURE: 82 MMHG | BODY MASS INDEX: 26.93 KG/M2 | HEIGHT: 63 IN | SYSTOLIC BLOOD PRESSURE: 134 MMHG | WEIGHT: 152 LBS | HEART RATE: 64 BPM

## 2018-10-25 DIAGNOSIS — E04.1 RIGHT THYROID NODULE: Primary | ICD-10-CM

## 2018-10-25 DIAGNOSIS — E04.1 THYROID NODULE, HOT: ICD-10-CM

## 2018-10-25 PROCEDURE — 99204 OFFICE O/P NEW MOD 45 MIN: CPT | Performed by: NURSE PRACTITIONER

## 2018-10-25 PROCEDURE — 99213 OFFICE O/P EST LOW 20 MIN: CPT | Performed by: INTERNAL MEDICINE

## 2018-10-25 ASSESSMENT — ENCOUNTER SYMPTOMS
EYE REDNESS: 0
EYE PAIN: 0
VOMITING: 0
ALLERGIC/IMMUNOLOGIC NEGATIVE: 1
DIARRHEA: 1
COUGH: 0
CONSTIPATION: 1
EYE DISCHARGE: 0
NAUSEA: 0
SINUS PRESSURE: 1
ABDOMINAL PAIN: 0
SHORTNESS OF BREATH: 0
SINUS PAIN: 0
BACK PAIN: 0
WHEEZING: 0
SORE THROAT: 0

## 2018-11-03 NOTE — PROGRESS NOTES
obstruction or gangrene    Right thyroid nodule     Allergies   Allergen Reactions    Meperidine Other (See Comments)     Upset stomach    Morphine Other (See Comments)     Upset stomach    Percocet [Oxycodone-Acetaminophen]      Upset stomach       Current Outpatient Prescriptions:     propranolol (INDERAL LA) 60 MG extended release capsule, Take 1 capsule by mouth daily, Disp: 30 capsule, Rfl: 3    traMADol-acetaminophen (ULTRACET) 37.5-325 MG per tablet, TAKE ONE TABLET BY MOUTH EVERY 6 HOURS AS NEEDED FOR PAIN FOR UP TO  15 DAYS, Disp: , Rfl: 0    PARoxetine (PAXIL) 10 MG tablet, Take 1 tab twice daily. , Disp: 90 tablet, Rfl: 3    losartan-hydrochlorothiazide (HYZAAR) 50-12.5 MG per tablet, TAKE ONE TABLET BY MOUTH EVERY DAY, Disp: 90 tablet, Rfl: 3    naproxen (NAPROSYN) 500 MG tablet, TAKE ONE TABLET BY MOUTH TWICE A DAY WITH MEALS as needed, Disp: 180 tablet, Rfl: 3    RaNITidine HCl (ZANTAC 150 MAXIMUM STRENGTH PO), Take 150 mg by mouth 2 times daily, Disp: , Rfl:     Loratadine (CLARITIN PO), Take by mouth nightly, Disp: , Rfl:       Review of Systems   Endocrine: Negative. All other systems reviewed and are negative. Vitals:    10/25/18 1204   BP: 134/82   Site: Right Upper Arm   Position: Sitting   Cuff Size: Medium Adult   Pulse: 64   Weight: 152 lb (68.9 kg)   Height: 5' 3\" (1.6 m)       Objective:   Physical Exam   Constitutional: She appears well-developed and well-nourished. Eyes: Conjunctivae are normal.   Cardiovascular: Normal rate. Pulmonary/Chest: Effort normal.   Musculoskeletal: Normal range of motion. Neurological: She is alert. Psychiatric: She has a normal mood and affect. Assessment:       Diagnosis Orders   1. Right thyroid nodule  T4, Free    TSH without Reflex   2.  Thyroid nodule, hot             Plan:        Orders Placed This Encounter   Procedures    T4, Free     Standing Status:   Future     Standing Expiration Date:   10/25/2019    TSH without

## 2018-11-05 ENCOUNTER — PROCEDURE VISIT (OUTPATIENT)
Dept: INTERVENTIONAL RADIOLOGY/VASCULAR | Age: 68
End: 2018-11-05
Payer: MEDICARE

## 2018-11-05 VITALS
SYSTOLIC BLOOD PRESSURE: 143 MMHG | HEART RATE: 57 BPM | OXYGEN SATURATION: 96 % | DIASTOLIC BLOOD PRESSURE: 83 MMHG | RESPIRATION RATE: 14 BRPM

## 2018-11-05 DIAGNOSIS — E04.1 HOT THYROID NODULE: Primary | ICD-10-CM

## 2018-11-05 DIAGNOSIS — E04.1 RIGHT THYROID NODULE: ICD-10-CM

## 2018-11-05 PROCEDURE — 99214 OFFICE O/P EST MOD 30 MIN: CPT | Performed by: RADIOLOGY

## 2018-11-11 ASSESSMENT — ENCOUNTER SYMPTOMS
SHORTNESS OF BREATH: 0
VOMITING: 0
NAUSEA: 0
TROUBLE SWALLOWING: 1
DIARRHEA: 0
GASTROINTESTINAL NEGATIVE: 1
PHOTOPHOBIA: 0
BACK PAIN: 0
COUGH: 0
CONSTIPATION: 0
RESPIRATORY NEGATIVE: 1
ABDOMINAL PAIN: 0
EYES NEGATIVE: 1
WHEEZING: 0

## 2018-11-12 NOTE — PROGRESS NOTES
rash.   Allergic/Immunologic: Negative for environmental allergies. Neurological: Negative. Negative for dizziness, tremors, weakness and headaches. Hematological: Does not bruise/bleed easily. Psychiatric/Behavioral: Negative. Negative for hallucinations and suicidal ideas. The patient is not nervous/anxious.         OBJECTIVE:  BP (!) 143/83   Pulse 57   Resp 14   SpO2 96%     Physical Exam    ASSESSMENT ANDPLAN:    ASSESSMENT: 2 heterogeneous large right thyroid nodules    PLAN: Ultrasound-guided thyroid biopsy of the 2 right thyroid lobe nodules    Roddy Domingo MD

## 2018-11-15 ENCOUNTER — OFFICE VISIT (OUTPATIENT)
Dept: ENDOCRINOLOGY | Age: 68
End: 2018-11-15
Payer: MEDICARE

## 2018-11-15 VITALS
SYSTOLIC BLOOD PRESSURE: 117 MMHG | BODY MASS INDEX: 26.93 KG/M2 | WEIGHT: 152 LBS | HEIGHT: 63 IN | HEART RATE: 63 BPM | DIASTOLIC BLOOD PRESSURE: 73 MMHG | OXYGEN SATURATION: 95 %

## 2018-11-15 DIAGNOSIS — E05.20 GOITER, TOXIC, MULTINODULAR: ICD-10-CM

## 2018-11-15 DIAGNOSIS — E04.1 HOT THYROID NODULE: Primary | ICD-10-CM

## 2018-11-15 DIAGNOSIS — E04.1 HOT THYROID NODULE: ICD-10-CM

## 2018-11-15 LAB
T4 FREE: 1.12 NG/DL (ref 0.93–1.7)
TSH SERPL DL<=0.05 MIU/L-ACNC: 0.03 UIU/ML (ref 0.27–4.2)

## 2018-11-15 PROCEDURE — 99214 OFFICE O/P EST MOD 30 MIN: CPT | Performed by: INTERNAL MEDICINE

## 2018-11-21 DIAGNOSIS — Z12.31 SCREENING MAMMOGRAM, ENCOUNTER FOR: ICD-10-CM

## 2018-11-21 PROCEDURE — 77067 SCR MAMMO BI INCL CAD: CPT | Performed by: INTERNAL MEDICINE

## 2018-11-23 PROBLEM — E05.20 GOITER, TOXIC, MULTINODULAR: Status: ACTIVE | Noted: 2018-11-23

## 2018-11-23 NOTE — PROGRESS NOTES
Subjective:      Patient ID: Ricardo Molina is a 77 y.o. female. 3 week f/u for hyperthyroidism   Other   This is a recurrent (hyperthyroidism /goiter) problem. The current episode started more than 1 month ago. The problem has been waxing and waning. Exacerbated by: rt hot nodule  Treatments tried: inderal la  The treatment provided mild relief. reviewed labs  Results for Liliam Abraham (MRN [id number]) as of 11/23/2018 16:08   Ref. Range 11/15/2018 15:32   TSH Latest Ref Range: 0.270 - 4.200 uIU/mL 0.028 (L)   T4 Free Latest Ref Range: 0.93 - 1.70 ng/dL 1.12       S/p biopsy of 2 thyroid nodules reviewed results one biopsy non diagnostic     FINAL DIAGNOSIS:  A.  RIGHT THYROID INFERIOR POLE NODULE BIOPSY-  FRAGMENTS OF THYROID TISSUE WITH VARIABLE SIZED FOLLICLES AND FOCAL MILD  FIBROSIS. B.  RIGHT SUPERIOR POLE-  SCANT BLOOD AND FIBROUS TISSUE, NO THYROID FOLLICLES NOTED IN THIS  MATERIAL.   SIVES/SIVES      CLINICAL INFORMATION:  Right thyroid nodule.     SPECIMEN:  A.  Right Thyroid Nodule, Inferior Pole  B.  RIGHT THYROID NODULE SUPERIOR POLE    Patient Active Problem List   Diagnosis    Depression    Diverticulosis of large intestine    Gastroesophageal reflux disease    Angiosarcoma ([address]    Hypertension    Dizziness    SOB (shortness of breath)    Seasonal allergies    Bilateral inguinal hernia without obstruction or gangrene    Hot thyroid nodule     Allergies   Allergen Reactions    Meperidine Other (See Comments)     Upset stomach    Morphine Other (See Comments)     Upset stomach    Percocet [Oxycodone-Acetaminophen]      Upset stomach       Current Outpatient Prescriptions:     propranolol (INDERAL LA) 60 MG extended release capsule, Take 1 capsule by mouth daily, Disp: 30 capsule, Rfl: 3    traMADol-acetaminophen (ULTRACET) 37.5-325 MG per tablet, TAKE ONE TABLET BY MOUTH EVERY 6 HOURS AS NEEDED FOR PAIN FOR UP TO  15 DAYS, Disp: , Rfl: 0    PARoxetine (PAXIL) 10 MG

## 2018-12-27 DIAGNOSIS — J00 ACUTE NASOPHARYNGITIS: ICD-10-CM

## 2018-12-27 RX ORDER — CEFUROXIME AXETIL 500 MG/1
500 TABLET ORAL 2 TIMES DAILY
Qty: 20 TABLET | Refills: 0 | Status: SHIPPED | OUTPATIENT
Start: 2018-12-27 | End: 2019-01-06

## 2019-01-03 RX ORDER — PROPRANOLOL HCL 60 MG
CAPSULE, EXTENDED RELEASE 24HR ORAL
Qty: 30 CAPSULE | Refills: 2 | Status: SHIPPED | OUTPATIENT
Start: 2019-01-03 | End: 2019-03-29 | Stop reason: SDUPTHER

## 2019-02-15 ENCOUNTER — OFFICE VISIT (OUTPATIENT)
Dept: ENDOCRINOLOGY | Age: 69
End: 2019-02-15
Payer: MEDICARE

## 2019-02-15 VITALS
WEIGHT: 156 LBS | BODY MASS INDEX: 26.63 KG/M2 | DIASTOLIC BLOOD PRESSURE: 68 MMHG | OXYGEN SATURATION: 96 % | SYSTOLIC BLOOD PRESSURE: 104 MMHG | HEART RATE: 64 BPM | HEIGHT: 64 IN

## 2019-02-15 DIAGNOSIS — E04.9 GOITER: ICD-10-CM

## 2019-02-15 DIAGNOSIS — E04.1 HOT THYROID NODULE: Primary | ICD-10-CM

## 2019-02-15 PROCEDURE — 99213 OFFICE O/P EST LOW 20 MIN: CPT | Performed by: INTERNAL MEDICINE

## 2019-03-07 ENCOUNTER — OFFICE VISIT (OUTPATIENT)
Dept: CARDIOLOGY CLINIC | Age: 69
End: 2019-03-07
Payer: MEDICARE

## 2019-03-07 VITALS
WEIGHT: 160.8 LBS | DIASTOLIC BLOOD PRESSURE: 76 MMHG | RESPIRATION RATE: 18 BRPM | HEIGHT: 63 IN | SYSTOLIC BLOOD PRESSURE: 120 MMHG | OXYGEN SATURATION: 97 % | BODY MASS INDEX: 28.49 KG/M2 | HEART RATE: 65 BPM

## 2019-03-07 DIAGNOSIS — E05.20 GOITER, TOXIC, MULTINODULAR: ICD-10-CM

## 2019-03-07 DIAGNOSIS — I10 ESSENTIAL HYPERTENSION: Primary | ICD-10-CM

## 2019-03-07 PROCEDURE — 99213 OFFICE O/P EST LOW 20 MIN: CPT | Performed by: INTERNAL MEDICINE

## 2019-03-07 ASSESSMENT — ENCOUNTER SYMPTOMS
VOMITING: 0
COLOR CHANGE: 0
DIARRHEA: 0
NAUSEA: 0
APNEA: 0
SHORTNESS OF BREATH: 0
BLOOD IN STOOL: 0
CHEST TIGHTNESS: 0

## 2019-03-27 ENCOUNTER — TELEPHONE (OUTPATIENT)
Dept: PRIMARY CARE CLINIC | Age: 69
End: 2019-03-27

## 2019-03-27 DIAGNOSIS — J00 ACUTE NASOPHARYNGITIS: ICD-10-CM

## 2019-03-27 RX ORDER — AMOXICILLIN AND CLAVULANATE POTASSIUM 875; 125 MG/1; MG/1
1 TABLET, FILM COATED ORAL 2 TIMES DAILY
Qty: 20 TABLET | Refills: 0 | Status: SHIPPED | OUTPATIENT
Start: 2019-03-27 | End: 2019-04-03 | Stop reason: SDUPTHER

## 2019-04-01 RX ORDER — PROPRANOLOL HCL 60 MG
CAPSULE, EXTENDED RELEASE 24HR ORAL
Qty: 30 CAPSULE | Refills: 1 | Status: SHIPPED | OUTPATIENT
Start: 2019-04-01 | End: 2019-05-24 | Stop reason: SDUPTHER

## 2019-04-03 DIAGNOSIS — J00 ACUTE NASOPHARYNGITIS: ICD-10-CM

## 2019-04-03 RX ORDER — AMOXICILLIN AND CLAVULANATE POTASSIUM 875; 125 MG/1; MG/1
1 TABLET, FILM COATED ORAL 2 TIMES DAILY
Qty: 20 TABLET | Refills: 0 | Status: SHIPPED | OUTPATIENT
Start: 2019-04-03 | End: 2019-04-13

## 2019-04-04 ENCOUNTER — PATIENT MESSAGE (OUTPATIENT)
Dept: FAMILY MEDICINE CLINIC | Age: 69
End: 2019-04-04

## 2019-04-12 NOTE — TELEPHONE ENCOUNTER
Patient called in because she needs a referral from Dr. Bk Crystal to Damaris Trent, [facility], [place], because she has an appointment on 5/1/19 with to check the floaters in both of her eyes. Patient was referred to Damaris Trent from [address] [place] [address] but her insurance requires a referral from [facility]. Once referral is done please give to me. Please advise.

## 2019-04-15 DIAGNOSIS — H43.399 VITREOUS FLOATERS, UNSPECIFIED LATERALITY: Primary | ICD-10-CM

## 2019-05-06 DIAGNOSIS — E04.1 HOT THYROID NODULE: ICD-10-CM

## 2019-05-06 LAB
T4 FREE: 1.05 NG/DL (ref 0.84–1.68)
TSH REFLEX: 0.04 UIU/ML (ref 0.44–3.86)

## 2019-05-15 ENCOUNTER — OFFICE VISIT (OUTPATIENT)
Dept: ENDOCRINOLOGY | Age: 69
End: 2019-05-15
Payer: MEDICARE

## 2019-05-15 VITALS
HEIGHT: 63 IN | SYSTOLIC BLOOD PRESSURE: 112 MMHG | HEART RATE: 68 BPM | DIASTOLIC BLOOD PRESSURE: 65 MMHG | BODY MASS INDEX: 28.35 KG/M2 | WEIGHT: 160 LBS

## 2019-05-15 DIAGNOSIS — E04.1 HOT THYROID NODULE: Primary | ICD-10-CM

## 2019-05-15 PROCEDURE — 99213 OFFICE O/P EST LOW 20 MIN: CPT | Performed by: INTERNAL MEDICINE

## 2019-05-19 NOTE — PROGRESS NOTES
Subjective:      Patient ID: Js Posey is a 68 y.o. female. 3 month  f/u for hyperthyroidism   Other   This is a recurrent (hyperthyroidism /goiter) problem. The current episode started more than 1 month ago. The problem has been waxing and waning. Exacerbated by: rt hot nodule  Treatments tried: inderal la  The treatment provided mild relief. Results for Jose Turner (MRN [id number]) as of 5/19/2019 19:53   Ref. Range 5/6/2019 11:39   TSH Latest Ref Range: 0.440 - 3.860 uIU/mL 0.037 (L)   T4 Free Latest Ref Range: 0.84 - 1.68 ng/dL 1.05       Patient Active Problem List   Diagnosis    Depression    Diverticulosis of large intestine    Gastroesophageal reflux disease    Angiosarcoma (HCC)    Hypertension    Dizziness    SOB (shortness of breath)    Seasonal allergies    Bilateral inguinal hernia without obstruction or gangrene    Hot thyroid nodule    Goiter, toxic, multinodular     Allergies   Allergen Reactions    Meperidine Other (See Comments)     Upset stomach    Morphine Other (See Comments)     Upset stomach    Percocet [Oxycodone-Acetaminophen]      Upset stomach       Current Outpatient Medications:     propranolol (INDERAL LA) 60 MG extended release capsule, TAKE ONE CAPSULE BY MOUTH EVERY DAY, Disp: 30 capsule, Rfl: 1    traMADol-acetaminophen (ULTRACET) 37.5-325 MG per tablet, TAKE ONE TABLET BY MOUTH EVERY 6 HOURS AS NEEDED FOR PAIN FOR UP TO  15 DAYS, Disp: , Rfl: 0    PARoxetine (PAXIL) 10 MG tablet, Take 1 tab twice daily. , Disp: 90 tablet, Rfl: 3    losartan-hydrochlorothiazide (HYZAAR) 50-12.5 MG per tablet, TAKE ONE TABLET BY MOUTH EVERY DAY, Disp: 90 tablet, Rfl: 3    naproxen (NAPROSYN) 500 MG tablet, TAKE ONE TABLET BY MOUTH TWICE A DAY WITH MEALS as needed, Disp: 180 tablet, Rfl: 3    RaNITidine HCl (ZANTAC 150 MAXIMUM STRENGTH PO), Take 150 mg by mouth 2 times daily, Disp: , Rfl:     Loratadine (CLARITIN PO), Take by mouth nightly, Disp: , Rfl:       Review

## 2019-05-23 ENCOUNTER — TELEPHONE (OUTPATIENT)
Dept: FAMILY MEDICINE CLINIC | Age: 69
End: 2019-05-23

## 2019-05-23 ENCOUNTER — OFFICE VISIT (OUTPATIENT)
Dept: FAMILY MEDICINE CLINIC | Age: 69
End: 2019-05-23
Payer: MEDICARE

## 2019-05-23 VITALS
TEMPERATURE: 97.6 F | OXYGEN SATURATION: 98 % | HEART RATE: 54 BPM | DIASTOLIC BLOOD PRESSURE: 78 MMHG | BODY MASS INDEX: 28 KG/M2 | SYSTOLIC BLOOD PRESSURE: 116 MMHG | HEIGHT: 63 IN | WEIGHT: 158 LBS | RESPIRATION RATE: 14 BRPM

## 2019-05-23 DIAGNOSIS — T36.95XA ANTIBIOTIC-INDUCED YEAST INFECTION: ICD-10-CM

## 2019-05-23 DIAGNOSIS — B37.9 ANTIBIOTIC-INDUCED YEAST INFECTION: ICD-10-CM

## 2019-05-23 DIAGNOSIS — Z12.11 COLON CANCER SCREENING: ICD-10-CM

## 2019-05-23 DIAGNOSIS — J20.9 BRONCHITIS WITH BRONCHOSPASM: Primary | ICD-10-CM

## 2019-05-23 DIAGNOSIS — Z91.81 AT HIGH RISK FOR FALLS: ICD-10-CM

## 2019-05-23 PROCEDURE — 99213 OFFICE O/P EST LOW 20 MIN: CPT | Performed by: NURSE PRACTITIONER

## 2019-05-23 RX ORDER — ALBUTEROL SULFATE 90 UG/1
2 AEROSOL, METERED RESPIRATORY (INHALATION) 4 TIMES DAILY PRN
Qty: 1 INHALER | Refills: 0 | Status: SHIPPED | OUTPATIENT
Start: 2019-05-23 | End: 2019-09-05

## 2019-05-23 RX ORDER — METHYLPREDNISOLONE 4 MG/1
TABLET ORAL
Qty: 1 KIT | Refills: 0 | Status: SHIPPED | OUTPATIENT
Start: 2019-05-23 | End: 2019-06-05

## 2019-05-23 RX ORDER — CEFDINIR 300 MG/1
300 CAPSULE ORAL 2 TIMES DAILY
Qty: 20 CAPSULE | Refills: 0 | Status: SHIPPED | OUTPATIENT
Start: 2019-05-23 | End: 2020-01-22 | Stop reason: SDUPTHER

## 2019-05-23 RX ORDER — FLUCONAZOLE 100 MG/1
100 TABLET ORAL DAILY
Qty: 2 TABLET | Refills: 0 | Status: SHIPPED | OUTPATIENT
Start: 2019-05-23 | End: 2019-06-05

## 2019-05-23 NOTE — TELEPHONE ENCOUNTER
Sakina from [facility] calling regarding albuterol HFA inhaler. Pharmacist wanted to know if you are aware the patient uses propranol. If it is short term it should be ok she said. Their # is [phone].

## 2019-05-23 NOTE — PROGRESS NOTES
Date Due    DTaP/Tdap/Td vaccine (1 - Tdap) [birth year]/1967    Shingles Vaccine (1 of 2) 03/12/1998    Colon cancer screen colonoscopy  03/12/1998    Pneumococcal 65+ years Vaccine (1 of 2 - PCV13) 03/12/2013       Health Maintenance reviewed - cologuard initiated. Discussed PCV    Falls Screening    Fall Risk 5/23/2019 4/3/2018 6/12/2017 1/21/2016   2 or more falls in past year? yes no no no   Fall with injury in past year? no no no yes       On the basis of positive falls risk screening, assessment andplan is as follows: Avoid throw rugs take time changing positions move all cords out of walk areas. Personal, Social, Family History and Allergies    Patient's medications, allergies, past medical, surgical, social and family histories were reviewed and updated as appropriate. ROS    Review of Systems   Constitutional: Negative for activity change, appetite change, chills, diaphoresis, fatigue and fever. HENT: Positive for congestion, postnasal drip and sore throat. Negative for drooling, ear discharge, ear pain, hearing loss, rhinorrhea, sinus pressure, sinus pain, sneezing and trouble swallowing. Eyes: Negative for pain, discharge, redness and itching. Respiratory: Positive for cough, shortness of breath and wheezing. Negative for chest tightness. Cardiovascular: Negative for chest pain and palpitations. Gastrointestinal: Negative for constipation, diarrhea, nausea and vomiting. Allergic/Immunologic: Negative for environmental allergies and food allergies. Objective    Physical Exam   Constitutional: She is oriented to person, place, and time. Vital signs are normal. She appears well-developed and well-nourished. HENT:   Head: Normocephalic. Right Ear: Hearing, tympanic membrane, external ear and ear canal normal.   Left Ear: Hearing, tympanic membrane, external ear and ear canal normal.   Nose: Mucosal edema present. No rhinorrhea.  Right sinus exhibits no maxillary sinus tenderness and no frontal sinus tenderness. Left sinus exhibits no maxillary sinus tenderness and no frontal sinus tenderness. Mouth/Throat: Posterior oropharyngeal erythema present. PND   Eyes: Pupils are equal, round, and reactive to light. Conjunctivae and EOM are normal.   Neck: Normal range of motion. Neck supple. Cardiovascular: Normal rate and regular rhythm. Pulmonary/Chest: Effort normal. She has wheezes (exp wheezing). + cough   Abdominal: Normal appearance. Musculoskeletal: Normal range of motion. Lymphadenopathy:        Head (right side): No submental, no submandibular, no tonsillar, no preauricular, no posterior auricular and no occipital adenopathy present. Head (left side): No submental, no submandibular, no tonsillar, no preauricular, no posterior auricular and no occipital adenopathy present. She has no cervical adenopathy. Right: No supraclavicular adenopathy present. Left: No supraclavicular adenopathy present. Neurological: She is alert and oriented to person, place, and time. Skin: Skin is warm and dry. Psychiatric: She has a normal mood and affect. Her speech is normal and behavior is normal. Judgment and thought content normal. Cognition and memory are normal.   Nursing note and vitals reviewed. Assessment and Treatment     Diagnosis Orders   1. Bronchitis with bronchospasm  albuterol sulfate  (90 Base) MCG/ACT inhaler    cefdinir (OMNICEF) 300 MG capsule    methylPREDNISolone (MEDROL, DARIO,) 4 MG tablet   2. Antibiotic-induced yeast infection  fluconazole (DIFLUCAN) 100 MG tablet   3. Colon cancer screening  COLOGUARD   4. At high risk for falls         Plan and Follow Up    Orders Placed This Encounter   Procedures    COLOGUARD     This test is performed by an external laboratory and is used for result attachment only. It is required that this order requisition be faxed to: [facility] @@ [phone].   See [email or web address] for further least 1 hour. Avoid Alcohol. Supportive Measures  Wash hands frequently, Tylenol or Ibuprofen for discomfort or fever. For sorethroat use warm salt water gargles, cough drops or chloraseptic spray. For cough add humidification to the air for dry environments. Sit in a steam shower. Add pillows underneath the mattress to help sleep with head of bedelevated decreasing night time cough and shortness of breath cause by post nasal drip. Advised to increase fluid intake and rest as tolerated. Monitor for signs of dehydration which can include dry mucous membranes,decreased urine output, sunken dark eyes. Stop smoking and avoid second hand smoke    OTC Medication:    Warning:  Use any OTC product with caution and with careful consideration of comorbidities such as diabetes and hypertension. Decongestants:    Patient was advised to avoid using medications with the initials D or DM such as Zyrtec-D for more than 4 days. After 4 days patient, should then transition to regular Zyrtec for the management of seasonal allergies. Also, avoid long term use of things such as pseudoephedrine and Afrin as they can worsen symptomsand become addictive. Patient was instructed that she should NOT be taking antihistamines with pseudoephedrine long-term. Nasal Sprays  Patient was instructed nasal sprays. Avoid with history ofhypertension. Prior to using nasal medication blow nose. Advised patient to keep nose over toes to avoid foul taste, breathe out slowly squeeze the pump or press down canister as you slowly breathe through your nose use lefthad to spray right nares, use right hand to spray left nares, spray toward outer nares versus the septum to avoid irritation that can cause nose bleeds. Avoid sneezing if possible or blowing nose for 5-10 minutes aftermedication use. Cleanse medicine canister after each use. Expectorant    Increase fluid intake to enhance effectiveness of Mucinex.     Inhaler:  Inhaler Lise Braun

## 2019-05-24 PROBLEM — Z12.11 COLON CANCER SCREENING: Status: ACTIVE | Noted: 2019-05-24

## 2019-05-24 PROBLEM — J20.9 BRONCHITIS WITH BRONCHOSPASM: Status: ACTIVE | Noted: 2019-05-24

## 2019-05-24 PROBLEM — B37.9 ANTIBIOTIC-INDUCED YEAST INFECTION: Status: ACTIVE | Noted: 2019-05-24

## 2019-05-24 PROBLEM — T36.95XA ANTIBIOTIC-INDUCED YEAST INFECTION: Status: ACTIVE | Noted: 2019-05-24

## 2019-05-24 PROBLEM — Z91.81 AT HIGH RISK FOR FALLS: Status: ACTIVE | Noted: 2019-05-24

## 2019-05-24 ASSESSMENT — ENCOUNTER SYMPTOMS
DIARRHEA: 0
WHEEZING: 1
CONSTIPATION: 0
EYE REDNESS: 0
EYE DISCHARGE: 0
EYE ITCHING: 0
SHORTNESS OF BREATH: 1
NAUSEA: 0
RHINORRHEA: 0
TROUBLE SWALLOWING: 0
COUGH: 1
VOMITING: 0
SORE THROAT: 1
EYE PAIN: 0
SINUS PAIN: 0
CHEST TIGHTNESS: 0
SINUS PRESSURE: 0

## 2019-05-24 NOTE — TELEPHONE ENCOUNTER
Called pharmacy clarified that I did talk with patient in great detail about the complications of using both and when to stop the inhaler if s/e occur. She was also advised to stop use after 1-2 weeks. She verbally demonstrated understanding.

## 2019-05-28 RX ORDER — PROPRANOLOL HCL 60 MG
CAPSULE, EXTENDED RELEASE 24HR ORAL
Qty: 30 CAPSULE | Refills: 3 | Status: SHIPPED | OUTPATIENT
Start: 2019-05-28 | End: 2019-06-19 | Stop reason: SDUPTHER

## 2019-06-05 ENCOUNTER — OFFICE VISIT (OUTPATIENT)
Dept: FAMILY MEDICINE CLINIC | Age: 69
End: 2019-06-05
Payer: MEDICARE

## 2019-06-05 VITALS
SYSTOLIC BLOOD PRESSURE: 120 MMHG | HEART RATE: 60 BPM | OXYGEN SATURATION: 98 % | TEMPERATURE: 98 F | RESPIRATION RATE: 14 BRPM | BODY MASS INDEX: 27.82 KG/M2 | HEIGHT: 63 IN | WEIGHT: 157 LBS | DIASTOLIC BLOOD PRESSURE: 76 MMHG

## 2019-06-05 DIAGNOSIS — Z23 NEED FOR PNEUMOCOCCAL VACCINATION: ICD-10-CM

## 2019-06-05 DIAGNOSIS — Z13.220 SCREENING FOR LIPID DISORDERS: ICD-10-CM

## 2019-06-05 DIAGNOSIS — R29.6 FALLS FREQUENTLY: Primary | ICD-10-CM

## 2019-06-05 DIAGNOSIS — S00.11XA CONTUSION OF RIGHT EYELID, INITIAL ENCOUNTER: ICD-10-CM

## 2019-06-05 DIAGNOSIS — R27.0 ATAXIA: ICD-10-CM

## 2019-06-05 DIAGNOSIS — I10 ESSENTIAL HYPERTENSION: ICD-10-CM

## 2019-06-05 PROCEDURE — G0009 ADMIN PNEUMOCOCCAL VACCINE: HCPCS | Performed by: NURSE PRACTITIONER

## 2019-06-05 PROCEDURE — 90670 PCV13 VACCINE IM: CPT | Performed by: NURSE PRACTITIONER

## 2019-06-05 PROCEDURE — 99213 OFFICE O/P EST LOW 20 MIN: CPT | Performed by: NURSE PRACTITIONER

## 2019-06-05 ASSESSMENT — ENCOUNTER SYMPTOMS
EYE DISCHARGE: 0
TROUBLE SWALLOWING: 0
NAUSEA: 0
SINUS PRESSURE: 0
RHINORRHEA: 0
CONSTIPATION: 0
COUGH: 0
SINUS PAIN: 0
VOMITING: 0
EYE PAIN: 0
EYE REDNESS: 0
WHEEZING: 0
CHEST TIGHTNESS: 0
SHORTNESS OF BREATH: 0
DIARRHEA: 0
BACK PAIN: 0
SORE THROAT: 0
EYE ITCHING: 0

## 2019-06-05 NOTE — PROGRESS NOTES
Subjective  Office Visit  [address] [facility] PRIMARY CARE  [place] [address]  Dept: [phone]  Dept Fax: [phone]  Loc: [phone]  Gumaro Forbes  YOB: 1950  Age: 68 y.o. Sex: female  Date of Assessment:  6/5/2019  PCP: INO Rowan - CNP    Chief Complaint   Patient presents with    Cough     LOV 5-23-19 prescribed Cefdinir, Medrol Jim and Albuterol Inhaler with significant relief.  Health Maintenance     Patient sent in Coluard yesterday, PCV13 pended. HPI      Presents today for follow up on: bronchitis. Patient verbalizes that they are feeling significantly better. They were compliant with the treatment as prescribed. Patient complains of no residual symptoms. Patient states she fell yesterday while walking the dog. She states that she did not trip feel dizzy sweaty  Lose consciousness she was walking and then the next thing she knows she was on the ground. She has a small contusion to her right eye brow and some on the eye. She denies any residual symptoms. She states that she did scrape her knee and that she cleaned it appropriately and it is fine. She also states she has trouble walking and feels like she is walking side to side weaving.      Vitals    /76 (Site: Right Upper Arm, Position: Sitting, Cuff Size: Medium Adult)   Pulse 60   Temp 98 °F (36.7 °C) (Temporal)   Resp 14   Ht 5' 3\" (1.6 m)   Wt 157 lb (71.2 kg)   SpO2 98%   BMI 27.81 kg/m²     BP Readings from Last 3 Encounters:   06/05/19 120/76   05/23/19 116/78   05/15/19 112/65         Wt Readings from Last 3 Encounters:   06/05/19 157 lb (71.2 kg)   05/23/19 158 lb (71.7 kg)   05/15/19 160 lb (72.6 kg)       Preventative Care and Risk Factor Assessment     Health Maintenance Due   Topic Date Due    DTaP/Tdap/Td vaccine (1 - Tdap) 03/12/1967    Shingles Vaccine (1 of 2) 03/12/1998    Colon cancer screen colonoscopy  03/12/1998 Health Maintenance reviewed - PCV given    Falls Screening    Fall Risk 5/23/2019 4/3/2018 6/12/2017 1/21/2016   2 or more falls in past year? yes no no no   Fall with injury in past year? no no no yes        On the basis of positive falls risk screening, assessment and plan is as follows: Discussed saftey tips. Personal, Social, Family History and Allergies    Patient's medications, allergies, past medical, surgical, social and family histories were reviewed and updated as appropriate. ROS    Review of Systems   Constitutional: Negative for activity change, appetite change, chills, diaphoresis, fatigue and fever. HENT: Negative for congestion, drooling, ear discharge, ear pain, hearing loss, postnasal drip, rhinorrhea, sinus pressure, sinus pain, sneezing, sore throat and trouble swallowing. Eyes: Negative for pain, discharge, redness and itching. Respiratory: Negative for cough, chest tightness, shortness of breath and wheezing. Cardiovascular: Negative for chest pain and palpitations. Gastrointestinal: Negative for constipation, diarrhea, nausea and vomiting. Musculoskeletal: Negative for arthralgias, back pain, gait problem, joint swelling, myalgias, neck pain and neck stiffness. Skin: Positive for wound. Negative for rash. Allergic/Immunologic: Negative for environmental allergies and food allergies. Neurological: Negative for dizziness, tremors, seizures, syncope, facial asymmetry, speech difficulty, weakness, light-headedness, numbness and headaches. Objective    Physical Exam   Constitutional: She is oriented to person, place, and time. Vital signs are normal. She appears well-developed and well-nourished. HENT:   Head: Normocephalic. Right Ear: Hearing, tympanic membrane, external ear and ear canal normal.   Left Ear: Hearing, tympanic membrane, external ear and ear canal normal.   Nose: Nose normal. No mucosal edema or rhinorrhea.  Right sinus exhibits no maxillary sinus tenderness and no frontal sinus tenderness. Left sinus exhibits no maxillary sinus tenderness and no frontal sinus tenderness. Mouth/Throat: Posterior oropharyngeal erythema present. Eyes: Pupils are equal, round, and reactive to light. Conjunctivae and EOM are normal.   Neck: Normal range of motion. Neck supple. Cardiovascular: Normal rate and regular rhythm. Pulmonary/Chest: Effort normal and breath sounds normal.   Abdominal: Normal appearance. Musculoskeletal: Normal range of motion. Lymphadenopathy:        Head (right side): No submental, no submandibular, no tonsillar, no preauricular, no posterior auricular and no occipital adenopathy present. Head (left side): No submental, no submandibular, no tonsillar, no preauricular, no posterior auricular and no occipital adenopathy present. She has no cervical adenopathy. Right: No supraclavicular adenopathy present. Left: No supraclavicular adenopathy present. Neurological: She is alert and oriented to person, place, and time. She has normal strength. No cranial nerve deficit or sensory deficit. She displays a negative Romberg sign. GCS eye subscore is 4. GCS verbal subscore is 5. GCS motor subscore is 6. Reflex Scores:       Patellar reflexes are 2+ on the right side and 2+ on the left side. Patient able to touch finger to nose on bilaterally with eyes closed. Skin: Skin is warm and dry. Psychiatric: She has a normal mood and affect. Her speech is normal and behavior is normal. Judgment and thought content normal. Cognition and memory are normal.   Nursing note and vitals reviewed. Assessment and Treatment     Diagnosis Orders   1. Falls frequently  MRI BRAIN WO CONTRAST   2. Ataxia  MRI BRAIN WO CONTRAST   3. Contusion of right eyelid, initial encounter     4. Need for pneumococcal vaccination  PREVNAR 13 IM (Pneumococcal conjugate vaccine 13-valent)   5.  Essential hypertension  Comprehensive Metabolic Panel CBC Auto Differential   6. Screening for lipid disorders  Lipid Panel     Plan and Follow Up    Orders Placed This Encounter   Procedures    MRI BRAIN WO CONTRAST     Standing Status:   Future     Standing Expiration Date:   6/5/2020     Order Specific Question:   Reason for exam:     Answer:   ATAXIA, FALLS    PREVNAR 13 IM (Pneumococcal conjugate vaccine 13-valent)    Comprehensive Metabolic Panel     Standing Status:   Future     Standing Expiration Date:   6/5/2020    CBC Auto Differential     Standing Status:   Future     Standing Expiration Date:   6/5/2020    Lipid Panel     Standing Status:   Future     Standing Expiration Date:   6/5/2020     Order Specific Question:   Is Patient Fasting?/# of Hours     Answer:   8-10       No orders of the defined types were placed in this encounter. Return in about 3 months (around 9/5/2019) for annual wellness visit give packet. .    Ataxia: Will complete MRI. Neuro assessment was negative. Will send to PT/OT if neg MRI and symptoms persist.    Patient was given AWV packet to complete with labs prior to appointment for AWV    Patient to follow with INO Cleaning CNP for all chronic condition management     Reviewed with the patient: current clinical status, medications, activities and diet. Side effects, adverse effects of the medication prescribed today, as wellas treatment plan/ rationale and result expectations have been discussed with the patient who expresses understanding and desires to proceed. Close follow up to evaluate treatment results and for coordination ofcare. I have reviewed the patient's medical history in detail and updated the computerized patient record.     INO Cleaning CNP      Future Appointments   Date Time Provider oRxy Garcia   8/15/2019 11:15 AM Nick Miles MD [facility]   9/5/2019 11:00 AM INO Cleaning CNP [facility]   3/5/2020 11:30 AM Ernie Singh MD [facility] [address] [place] [address]

## 2019-06-12 DIAGNOSIS — Z12.11 COLON CANCER SCREENING: ICD-10-CM

## 2019-06-14 ENCOUNTER — HOSPITAL ENCOUNTER (OUTPATIENT)
Dept: MRI IMAGING | Age: 69
Discharge: HOME | End: 2019-06-16
Payer: MEDICARE

## 2019-06-14 DIAGNOSIS — R27.0 ATAXIA: ICD-10-CM

## 2019-06-14 DIAGNOSIS — R29.6 FALLS FREQUENTLY: ICD-10-CM

## 2019-06-14 PROCEDURE — 70551 MRI BRAIN STEM W/O DYE: CPT

## 2019-06-17 DIAGNOSIS — R93.0 ABNORMAL MRI OF HEAD: Primary | ICD-10-CM

## 2019-06-19 DIAGNOSIS — R23.2 HOT FLASHES: ICD-10-CM

## 2019-06-19 RX ORDER — PAROXETINE 10 MG/1
TABLET, FILM COATED ORAL
Qty: 180 TABLET | Refills: 1 | Status: SHIPPED | OUTPATIENT
Start: 2019-06-19 | End: 2019-09-05

## 2019-06-19 RX ORDER — PROPRANOLOL HCL 60 MG
CAPSULE, EXTENDED RELEASE 24HR ORAL
Qty: 90 CAPSULE | Refills: 1 | Status: SHIPPED | OUTPATIENT
Start: 2019-06-19 | End: 2019-10-17 | Stop reason: SDUPTHER

## 2019-06-19 NOTE — TELEPHONE ENCOUNTER
Pharmacy is requesting medication refill. Please approve or deny this request.    Rx requested:  Requested Prescriptions     Pending Prescriptions Disp Refills    PARoxetine (PAXIL) 10 MG tablet 180 tablet 3     Sig: Take 1 tab twice daily.     propranolol (INDERAL LA) 60 MG extended release capsule 90 capsule 3     Sig: TAKE ONE CAPSULE BY MOUTH EVERY DAY         Last Office Visit:   6/5/2019      Next Visit Date:  Future Appointments   Date Time Provider [facility][1] Radha   8/15/2019 11:15 AM Andrew Ham MD [facility]   9/5/2019 11:00 AM Lisseth Weiss [facility]   3/5/2020 11:30 AM MD Nela Pollack

## 2019-06-23 PROBLEM — Z12.11 COLON CANCER SCREENING: Status: RESOLVED | Noted: 2019-05-24 | Resolved: 2019-06-23

## 2019-07-05 PROBLEM — Z13.220 SCREENING FOR LIPID DISORDERS: Status: RESOLVED | Noted: 2019-06-05 | Resolved: 2019-07-05

## 2019-07-31 DIAGNOSIS — E04.1 HOT THYROID NODULE: ICD-10-CM

## 2019-07-31 DIAGNOSIS — I10 ESSENTIAL HYPERTENSION: ICD-10-CM

## 2019-07-31 DIAGNOSIS — Z13.220 SCREENING FOR LIPID DISORDERS: ICD-10-CM

## 2019-07-31 LAB
ALBUMIN SERPL-MCNC: 4.2 G/DL (ref 3.5–4.6)
ALP BLD-CCNC: 104 U/L (ref 40–130)
ALT SERPL-CCNC: 12 U/L (ref 0–33)
ANION GAP SERPL CALCULATED.3IONS-SCNC: 11 MEQ/L (ref 9–15)
AST SERPL-CCNC: 14 U/L (ref 0–35)
BASOPHILS ABSOLUTE: 0.1 K/UL (ref 0–0.2)
BASOPHILS RELATIVE PERCENT: 0.7 %
BILIRUB SERPL-MCNC: 0.5 MG/DL (ref 0.2–0.7)
BUN BLDV-MCNC: 21 MG/DL (ref 8–23)
CALCIUM SERPL-MCNC: 10.6 MG/DL (ref 8.5–9.9)
CHLORIDE BLD-SCNC: 107 MEQ/L (ref 95–107)
CHOLESTEROL, TOTAL: 197 MG/DL (ref 0–199)
CO2: 27 MEQ/L (ref 20–31)
CREAT SERPL-MCNC: 0.5 MG/DL (ref 0.5–0.9)
EOSINOPHILS ABSOLUTE: 0.3 K/UL (ref 0–0.7)
EOSINOPHILS RELATIVE PERCENT: 3.7 %
FOLATE: 4.1 NG/ML (ref 7.3–26.1)
GFR AFRICAN AMERICAN: >60
GFR NON-AFRICAN AMERICAN: >60
GLOBULIN: 2.7 G/DL (ref 2.3–3.5)
GLUCOSE BLD-MCNC: 73 MG/DL (ref 70–99)
HBA1C MFR BLD: 4.8 % (ref 4.8–5.9)
HCT VFR BLD CALC: 44.7 % (ref 37–47)
HDLC SERPL-MCNC: 47 MG/DL (ref 40–59)
HEMOGLOBIN: 15.8 G/DL (ref 12–16)
LDL CHOLESTEROL CALCULATED: 118 MG/DL (ref 0–129)
LYMPHOCYTES ABSOLUTE: 1.7 K/UL (ref 1–4.8)
LYMPHOCYTES RELATIVE PERCENT: 23.3 %
MCH RBC QN AUTO: 31.2 PG (ref 27–31.3)
MCHC RBC AUTO-ENTMCNC: 35.3 % (ref 33–37)
MCV RBC AUTO: 88.4 FL (ref 82–100)
MONOCYTES ABSOLUTE: 0.5 K/UL (ref 0.2–0.8)
MONOCYTES RELATIVE PERCENT: 7.2 %
NEUTROPHILS ABSOLUTE: 4.7 K/UL (ref 1.4–6.5)
NEUTROPHILS RELATIVE PERCENT: 65.1 %
PDW BLD-RTO: 13.7 % (ref 11.5–14.5)
PLATELET # BLD: 259 K/UL (ref 130–400)
POTASSIUM SERPL-SCNC: 4.4 MEQ/L (ref 3.4–4.9)
RBC # BLD: 5.06 M/UL (ref 4.2–5.4)
SODIUM BLD-SCNC: 145 MEQ/L (ref 135–144)
T4 FREE: 1.12 NG/DL (ref 0.84–1.68)
TOTAL PROTEIN: 6.9 G/DL (ref 6.3–8)
TRIGL SERPL-MCNC: 159 MG/DL (ref 0–150)
TSH SERPL DL<=0.05 MIU/L-ACNC: 0.03 UIU/ML (ref 0.44–3.86)
VITAMIN B-12: 397 PG/ML (ref 232–1245)
VITAMIN D 25-HYDROXY: 27.8 NG/ML (ref 30–100)
WBC # BLD: 7.3 K/UL (ref 4.8–10.8)

## 2019-07-31 RX ORDER — FOLIC ACID 1 MG/1
1 TABLET ORAL DAILY
Qty: 30 TABLET | Refills: 5 | Status: SHIPPED | OUTPATIENT
Start: 2019-07-31 | End: 2020-01-12 | Stop reason: SDUPTHER

## 2019-07-31 RX ORDER — CHOLECALCIFEROL (VITAMIN D3) 50 MCG
2000 TABLET ORAL DAILY
Qty: 30 TABLET | Refills: 5 | Status: SHIPPED | OUTPATIENT
Start: 2019-07-31 | End: 2020-01-12 | Stop reason: SDUPTHER

## 2019-08-01 ENCOUNTER — TELEPHONE (OUTPATIENT)
Dept: ENDOCRINOLOGY | Age: 69
End: 2019-08-01

## 2019-08-15 ENCOUNTER — OFFICE VISIT (OUTPATIENT)
Dept: ENDOCRINOLOGY | Age: 69
End: 2019-08-15
Payer: MEDICARE

## 2019-08-15 VITALS
DIASTOLIC BLOOD PRESSURE: 68 MMHG | HEART RATE: 58 BPM | SYSTOLIC BLOOD PRESSURE: 135 MMHG | BODY MASS INDEX: 28.53 KG/M2 | HEIGHT: 63 IN | WEIGHT: 161 LBS

## 2019-08-15 DIAGNOSIS — E04.9 GOITER: Primary | ICD-10-CM

## 2019-08-15 DIAGNOSIS — E04.1 HOT THYROID NODULE: ICD-10-CM

## 2019-08-15 PROCEDURE — 99213 OFFICE O/P EST LOW 20 MIN: CPT | Performed by: INTERNAL MEDICINE

## 2019-08-15 ASSESSMENT — ENCOUNTER SYMPTOMS: TROUBLE SWALLOWING: 0

## 2019-08-15 NOTE — PROGRESS NOTES
Subjective:      Patient ID: Silver Lopez is a 68 y.o. female. 3-month follow-up on hyperthyroidism thyroid nodule goiter  Other   This is a chronic (Hyperthyroidism hot thyroid nodule goiter) problem. The current episode started more than 1 year ago. The problem has been unchanged. Treatments tried: Inderal LA. Reviewed last thyroid ultrasound from August of last year right lobe was bigger at 7+ centimeters  Left lobe was 5.8 cm   patient denies any dysphagia we will repeat another ultrasound will hold off any follow-up with ENT did see patient last December Dr. Juana Victor    She did have biopsy of her right thyroid  1.7 cm  nodule November of last year benign      Results for Pancho Curtis (MRN [id number]) as of 8/15/2019 11:52   Ref.  Range 7/31/2019 09:43   Sodium Latest Ref Range: 135 - 144 mEq/L 145 (H)   Potassium Latest Ref Range: 3.4 - 4.9 mEq/L 4.4   Chloride Latest Ref Range: 95 - 107 mEq/L 107   CO2 Latest Ref Range: 20 - 31 mEq/L 27   BUN Latest Ref Range: 8 - 23 mg/dL 21   Creatinine Latest Ref Range: 0.50 - 0.90 mg/dL 0.50   Anion Gap Latest Ref Range: 9 - 15 mEq/L 11   GFR Non- Latest Ref Range: >60  >60.0   GFR African American Latest Ref Range: >60  >60.0   Glucose Latest Ref Range: 70 - 99 mg/dL 73   Calcium Latest Ref Range: 8.5 - 9.9 mg/dL 10.6 (H)   Total Protein Latest Ref Range: 6.3 - 8.0 g/dL 6.9   Cholesterol, Total Latest Ref Range: 0 - 199 mg/dL 197   HDL Cholesterol Latest Ref Range: 40 - 59 mg/dL 47   LDL Calculated Latest Ref Range: 0 - 129 mg/dL 118   Triglycerides Latest Ref Range: 0 - 150 mg/dL 159 (H)   Albumin Latest Ref Range: 3.5 - 4.6 g/dL 4.2   Globulin Latest Ref Range: 2.3 - 3.5 g/dL 2.7   Alk Phos Latest Ref Range: 40 - 130 U/L 104   ALT Latest Ref Range: 0 - 33 U/L 12   AST Latest Ref Range: 0 - 35 U/L 14   Bilirubin Latest Ref Range: 0.2 - 0.7 mg/dL 0.5   Hemoglobin A1C Latest Ref Range: 4.8 - 5.9 % 4.8   TSH Latest Ref Range: 0.440 - 3.860 uIU/mL

## 2019-09-05 ENCOUNTER — OFFICE VISIT (OUTPATIENT)
Dept: FAMILY MEDICINE CLINIC | Age: 69
End: 2019-09-05
Payer: MEDICARE

## 2019-09-05 VITALS
OXYGEN SATURATION: 94 % | HEIGHT: 63 IN | SYSTOLIC BLOOD PRESSURE: 138 MMHG | DIASTOLIC BLOOD PRESSURE: 84 MMHG | HEART RATE: 55 BPM | BODY MASS INDEX: 28.31 KG/M2 | RESPIRATION RATE: 16 BRPM | WEIGHT: 159.8 LBS | TEMPERATURE: 98.1 F

## 2019-09-05 DIAGNOSIS — E87.1 HYPONATREMIA: ICD-10-CM

## 2019-09-05 DIAGNOSIS — M79.10 MYALGIA: ICD-10-CM

## 2019-09-05 DIAGNOSIS — E87.0 HYPERNATREMIA: ICD-10-CM

## 2019-09-05 DIAGNOSIS — E83.52 HYPERCALCEMIA: ICD-10-CM

## 2019-09-05 DIAGNOSIS — Z00.00 ENCOUNTER FOR MEDICARE ANNUAL WELLNESS EXAM: Primary | ICD-10-CM

## 2019-09-05 PROCEDURE — G0439 PPPS, SUBSEQ VISIT: HCPCS | Performed by: NURSE PRACTITIONER

## 2019-09-07 PROBLEM — S00.11XA: Status: RESOLVED | Noted: 2019-06-05 | Resolved: 2019-09-07

## 2019-09-07 PROBLEM — Z00.00 ENCOUNTER FOR MEDICARE ANNUAL WELLNESS EXAM: Status: ACTIVE | Noted: 2019-06-05

## 2019-09-07 PROBLEM — E87.1 HYPONATREMIA: Status: ACTIVE | Noted: 2019-09-07

## 2019-09-07 PROBLEM — Z23 NEED FOR PNEUMOCOCCAL VACCINATION: Status: RESOLVED | Noted: 2019-06-05 | Resolved: 2019-09-07

## 2019-09-07 PROBLEM — E83.52 HYPERCALCEMIA: Status: ACTIVE | Noted: 2019-09-07

## 2019-09-07 PROBLEM — J20.9 BRONCHITIS WITH BRONCHOSPASM: Status: RESOLVED | Noted: 2019-05-24 | Resolved: 2019-09-07

## 2019-09-07 PROBLEM — E87.0 HYPERNATREMIA: Status: ACTIVE | Noted: 2019-09-07

## 2019-09-07 PROBLEM — T36.95XA ANTIBIOTIC-INDUCED YEAST INFECTION: Status: RESOLVED | Noted: 2019-05-24 | Resolved: 2019-09-07

## 2019-09-07 PROBLEM — M79.10 MYALGIA: Status: ACTIVE | Noted: 2019-09-07

## 2019-09-07 PROBLEM — B37.9 ANTIBIOTIC-INDUCED YEAST INFECTION: Status: RESOLVED | Noted: 2019-05-24 | Resolved: 2019-09-07

## 2019-09-08 NOTE — PROGRESS NOTES
ear and ear canal normal bilaterally, nose without deformity, nasal mucosa and turbinates normal without polyps  Neck: supple and non-tender without mass, no thyromegaly or thyroid nodules, no cervical lymphadenopathy  Pulmonary/Chest: clear to auscultation bilaterally- no wheezes, rales or rhonchi, normal air movement, no respiratory distress  Cardiovascular: normal rate, regular rhythm, normal S1 and S2, no murmurs, rubs, clicks, or gallops, distal pulses intact, no carotid bruits  Abdomen: soft, non-tender, non-distended, normal bowel sounds, no masses or organomegaly  Extremities: no cyanosis, clubbing or edema  Musculoskeletal: normal range of motion, no joint swelling, deformity or tenderness  Neurologic: reflexes normal and symmetric, no cranial nerve deficit, gait, coordination and speech normal  Patient has memory issues not apparent during exam however the clock was inappropriately drawn and she admits to forgetfulness at home or while driving. Patient's complete Health Risk Assessment and screening values have been reviewed and are found in Flowsheets. The following problems were reviewed today and where indicated follow up appointments were made and/or referrals ordered. Positive Risk Factor Screenings with Interventions:     Fall Risk:  2 or more falls in past year?: (!) yes  Fall with injury in past year?: (!) yes  Fall Risk Interventions:    · Patient is seeing Dr. Vaibhav Unger for ataxia, she is in aqua and phsyical therapy with improvement. General Health:  General  In general, how would you say your health is?: Good  In the past 7 days, have you experienced any of the following? New or Increased Pain, New or Increased Fatigue, Loneliness, Social Isolation, Stress or Anger?: (!) Stress  Do you get the social and emotional support that you need?: (!) No  Do you have a Living Will?: Yes  General Health Risk Interventions:  · Patieint states her  is stressful.  His daughter was visiting so

## 2019-09-09 DIAGNOSIS — I10 ESSENTIAL HYPERTENSION: ICD-10-CM

## 2019-09-09 RX ORDER — LOSARTAN POTASSIUM AND HYDROCHLOROTHIAZIDE 12.5; 1 MG/1; MG/1
1 TABLET ORAL DAILY
Qty: 90 TABLET | Refills: 1 | Status: SHIPPED | OUTPATIENT
Start: 2019-09-09 | End: 2020-02-05

## 2019-10-07 PROBLEM — Z00.00 ENCOUNTER FOR MEDICARE ANNUAL WELLNESS EXAM: Status: RESOLVED | Noted: 2019-06-05 | Resolved: 2019-10-07

## 2019-10-08 ENCOUNTER — PATIENT MESSAGE (OUTPATIENT)
Dept: FAMILY MEDICINE CLINIC | Age: 69
End: 2019-10-08

## 2019-10-16 ENCOUNTER — PATIENT MESSAGE (OUTPATIENT)
Dept: FAMILY MEDICINE CLINIC | Age: 69
End: 2019-10-16

## 2019-10-16 RX ORDER — PAROXETINE 10 MG/1
10 TABLET, FILM COATED ORAL EVERY MORNING
Qty: 30 TABLET | Refills: 3
Start: 2019-10-16 | End: 2020-02-05 | Stop reason: SDUPTHER

## 2019-10-17 ENCOUNTER — TELEPHONE (OUTPATIENT)
Dept: ENDOCRINOLOGY | Age: 69
End: 2019-10-17

## 2019-10-17 RX ORDER — PROPRANOLOL HCL 60 MG
CAPSULE, EXTENDED RELEASE 24HR ORAL
Qty: 90 CAPSULE | Refills: 1 | Status: SHIPPED | OUTPATIENT
Start: 2019-10-17 | End: 2020-02-05 | Stop reason: SDUPTHER

## 2019-10-30 DIAGNOSIS — E53.8 FOLATE DEFICIENCY: Primary | ICD-10-CM

## 2019-11-07 ENCOUNTER — TELEPHONE (OUTPATIENT)
Dept: FAMILY MEDICINE CLINIC | Age: 69
End: 2019-11-07

## 2019-12-09 ENCOUNTER — HOSPITAL ENCOUNTER (OUTPATIENT)
Dept: ULTRASOUND IMAGING | Age: 69
Discharge: HOME | End: 2019-12-11
Payer: MEDICARE

## 2019-12-09 DIAGNOSIS — E87.0 HYPERNATREMIA: ICD-10-CM

## 2019-12-09 DIAGNOSIS — E83.52 HYPERCALCEMIA: ICD-10-CM

## 2019-12-09 DIAGNOSIS — E04.1 HOT THYROID NODULE: ICD-10-CM

## 2019-12-09 DIAGNOSIS — M79.10 MYALGIA: ICD-10-CM

## 2019-12-09 LAB
ALBUMIN SERPL-MCNC: 4.3 G/DL (ref 3.5–4.6)
ALP BLD-CCNC: 91 U/L (ref 40–130)
ALT SERPL-CCNC: 15 U/L (ref 0–33)
ANION GAP SERPL CALCULATED.3IONS-SCNC: 12 MEQ/L (ref 9–15)
AST SERPL-CCNC: 16 U/L (ref 0–35)
BILIRUB SERPL-MCNC: 0.6 MG/DL (ref 0.2–0.7)
BUN BLDV-MCNC: 25 MG/DL (ref 8–23)
CALCIUM SERPL-MCNC: 10.8 MG/DL (ref 8.5–9.9)
CHLORIDE BLD-SCNC: 104 MEQ/L (ref 95–107)
CO2: 26 MEQ/L (ref 20–31)
CREAT SERPL-MCNC: 0.52 MG/DL (ref 0.5–0.9)
GFR AFRICAN AMERICAN: >60
GFR NON-AFRICAN AMERICAN: >60
GLOBULIN: 2.4 G/DL (ref 2.3–3.5)
GLUCOSE BLD-MCNC: 72 MG/DL (ref 70–99)
MAGNESIUM: 2 MG/DL (ref 1.7–2.4)
POTASSIUM SERPL-SCNC: 4 MEQ/L (ref 3.4–4.9)
SODIUM BLD-SCNC: 142 MEQ/L (ref 135–144)
T4 FREE: 1.11 NG/DL (ref 0.84–1.68)
TOTAL PROTEIN: 6.7 G/DL (ref 6.3–8)
TSH SERPL DL<=0.05 MIU/L-ACNC: 0.05 UIU/ML (ref 0.44–3.86)

## 2019-12-09 PROCEDURE — 76536 US EXAM OF HEAD AND NECK: CPT

## 2019-12-16 ENCOUNTER — OFFICE VISIT (OUTPATIENT)
Dept: ENDOCRINOLOGY | Age: 69
End: 2019-12-16
Payer: MEDICARE

## 2019-12-16 VITALS
WEIGHT: 161 LBS | OXYGEN SATURATION: 79 % | RESPIRATION RATE: 16 BRPM | DIASTOLIC BLOOD PRESSURE: 79 MMHG | SYSTOLIC BLOOD PRESSURE: 131 MMHG | BODY MASS INDEX: 28.53 KG/M2 | HEIGHT: 63 IN | HEART RATE: 62 BPM

## 2019-12-16 DIAGNOSIS — E05.90 HYPERTHYROIDISM: ICD-10-CM

## 2019-12-16 DIAGNOSIS — E04.9 GOITER: Primary | ICD-10-CM

## 2019-12-16 DIAGNOSIS — E04.1 HOT THYROID NODULE: ICD-10-CM

## 2019-12-16 PROCEDURE — 99213 OFFICE O/P EST LOW 20 MIN: CPT | Performed by: INTERNAL MEDICINE

## 2020-01-12 RX ORDER — FOLIC ACID 1 MG/1
TABLET ORAL
Qty: 30 TABLET | Refills: 0 | Status: SHIPPED | OUTPATIENT
Start: 2020-01-12 | End: 2020-02-05

## 2020-01-12 RX ORDER — CHOLECALCIFEROL (VITAMIN D3) 50 MCG
TABLET ORAL
Qty: 30 TABLET | Refills: 0 | Status: SHIPPED | OUTPATIENT
Start: 2020-01-12 | End: 2020-02-05

## 2020-01-17 ENCOUNTER — TELEPHONE (OUTPATIENT)
Dept: FAMILY MEDICINE CLINIC | Age: 70
End: 2020-01-17

## 2020-01-17 RX ORDER — LORAZEPAM 0.5 MG/1
0.5 TABLET ORAL 2 TIMES DAILY PRN
Qty: 5 TABLET | Refills: 0 | Status: SHIPPED | OUTPATIENT
Start: 2020-01-17 | End: 2020-01-20

## 2020-01-17 NOTE — TELEPHONE ENCOUNTER
Patient called in and states that she is going to be traveling outside of the [place]. She is requesting some Ativan. We did discuss this previously and she was supposed to call me closer to the departure day which is what is occurring. I will send Ativan for flying. Controlled Substance Monitoring:    Acute and Chronic Pain Monitoring:   RX Monitoring 1/17/2020   Attestation -   Periodic Controlled Substance Monitoring No signs of potential drug abuse or diversion identified.

## 2020-01-22 ENCOUNTER — OFFICE VISIT (OUTPATIENT)
Dept: FAMILY MEDICINE CLINIC | Age: 70
End: 2020-01-22
Payer: MEDICARE

## 2020-01-22 VITALS
BODY MASS INDEX: 28.28 KG/M2 | WEIGHT: 159.6 LBS | DIASTOLIC BLOOD PRESSURE: 70 MMHG | OXYGEN SATURATION: 96 % | HEART RATE: 60 BPM | SYSTOLIC BLOOD PRESSURE: 122 MMHG | RESPIRATION RATE: 12 BRPM | HEIGHT: 63 IN | TEMPERATURE: 98.2 F

## 2020-01-22 PROCEDURE — 4040F PNEUMOC VAC/ADMIN/RCVD: CPT | Performed by: NURSE PRACTITIONER

## 2020-01-22 PROCEDURE — 99213 OFFICE O/P EST LOW 20 MIN: CPT | Performed by: NURSE PRACTITIONER

## 2020-01-22 PROCEDURE — G8399 PT W/DXA RESULTS DOCUMENT: HCPCS | Performed by: NURSE PRACTITIONER

## 2020-01-22 PROCEDURE — G8417 CALC BMI ABV UP PARAM F/U: HCPCS | Performed by: NURSE PRACTITIONER

## 2020-01-22 PROCEDURE — 1123F ACP DISCUSS/DSCN MKR DOCD: CPT | Performed by: NURSE PRACTITIONER

## 2020-01-22 PROCEDURE — G8427 DOCREV CUR MEDS BY ELIG CLIN: HCPCS | Performed by: NURSE PRACTITIONER

## 2020-01-22 PROCEDURE — G8482 FLU IMMUNIZE ORDER/ADMIN: HCPCS | Performed by: NURSE PRACTITIONER

## 2020-01-22 PROCEDURE — 3017F COLORECTAL CA SCREEN DOC REV: CPT | Performed by: NURSE PRACTITIONER

## 2020-01-22 PROCEDURE — 1036F TOBACCO NON-USER: CPT | Performed by: NURSE PRACTITIONER

## 2020-01-22 PROCEDURE — 1090F PRES/ABSN URINE INCON ASSESS: CPT | Performed by: NURSE PRACTITIONER

## 2020-01-22 RX ORDER — CEFDINIR 300 MG/1
300 CAPSULE ORAL 2 TIMES DAILY
Qty: 20 CAPSULE | Refills: 0 | Status: SHIPPED | OUTPATIENT
Start: 2020-01-22 | End: 2020-02-01

## 2020-01-23 ASSESSMENT — ENCOUNTER SYMPTOMS
TROUBLE SWALLOWING: 0
NAUSEA: 0
DIARRHEA: 0
EYE DISCHARGE: 0
EYE ITCHING: 0
WHEEZING: 0
SINUS PAIN: 0
VOMITING: 0
RHINORRHEA: 0
SORE THROAT: 1
EYE REDNESS: 0
CONSTIPATION: 0
EYE PAIN: 0
COUGH: 1
SINUS PRESSURE: 0
CHEST TIGHTNESS: 0
SHORTNESS OF BREATH: 0

## 2020-01-23 NOTE — PROGRESS NOTES
and Allergies    Patient's medications, allergies, past medical, surgical, social and family histories were reviewed and updated as appropriate. ROS    Review of Systems   Constitutional: Positive for fatigue. Negative for activity change, appetite change, chills, diaphoresis and fever. HENT: Positive for congestion and sore throat. Negative for drooling, ear discharge, ear pain, hearing loss, postnasal drip, rhinorrhea, sinus pressure, sinus pain, sneezing and trouble swallowing. Eyes: Negative for pain, discharge, redness and itching. Respiratory: Positive for cough. Negative for chest tightness, shortness of breath and wheezing. Cardiovascular: Negative for chest pain and palpitations. Gastrointestinal: Negative for constipation, diarrhea, nausea and vomiting. Allergic/Immunologic: Negative for environmental allergies and food allergies. Objective    Physical Exam  Vitals signs and nursing note reviewed. Constitutional:       Appearance: Normal appearance. She is well-developed. HENT:      Head: Normocephalic. Right Ear: Hearing, tympanic membrane, ear canal and external ear normal.      Left Ear: Hearing, tympanic membrane, ear canal and external ear normal.      Nose: Mucosal edema and rhinorrhea present. Right Sinus: No maxillary sinus tenderness or frontal sinus tenderness. Left Sinus: No maxillary sinus tenderness or frontal sinus tenderness. Mouth/Throat:      Pharynx: Posterior oropharyngeal erythema present. Eyes:      Conjunctiva/sclera: Conjunctivae normal.      Pupils: Pupils are equal, round, and reactive to light. Neck:      Musculoskeletal: Normal range of motion and neck supple. Cardiovascular:      Rate and Rhythm: Normal rate and regular rhythm. Pulmonary:      Effort: Pulmonary effort is normal.      Breath sounds: Rhonchi present. Musculoskeletal: Normal range of motion.    Lymphadenopathy:      Head:      Right side of head: No submental, shortness of breath cause by post nasal drip. Advised to increase fluid intake and rest as tolerated. Monitor for signs of dehydration which can include dry mucous membranes,decreased urine output, sunken dark eyes. Stop smoking and avoid second hand smoke    Go to ER if:      Go to ER immediately if difficulty breathing, drooling, difficulty swallowing occurs. Go if signs of dehydration occur or if any other symptoms worsen. Reviewed with the patient: current clinical status, medications, activities and diet. Side effects, adverse effects of the medication prescribed today, as well as treatment plan and result expectations have been discussed withthe patient who expresses understanding and desires to proceed with recommended treatment and action plan. Close follow up to evaluate treatment results and for coordination of care. I have reviewed the patient's medical history in detail and updated the computerized patient record. INO Loco CNP      Future Appointments   Date Time Provider Roxy Garcia   3/5/2020 11:30 AM Enedina Gusman, [facility]   6/16/2020 11:30 AM Meg Red MD [facility]   9/8/2020 10:00 AM INO Loco CNP [facility] [place]           Attending Supervising [de-identified] Attestation Statement  The patient met the criteria for indirect supervision. I reviewed the findings and plans of the Nurse Practitioner and agree as documented in her note .     Electronically signed by Adin Sweeney DO on 1/23/20 at 9:35 AM

## 2020-02-04 ENCOUNTER — TELEPHONE (OUTPATIENT)
Dept: FAMILY MEDICINE CLINIC | Age: 70
End: 2020-02-04

## 2020-02-04 NOTE — TELEPHONE ENCOUNTER
----- Message from INO Triplett CNP sent at 11/15/2019  7:44 PM EST -----  Patient to have labs on 2/7/20. Please call and remind. She can schedule an appointment for AWV the same day so she does not have to drive out here twice.

## 2020-02-04 NOTE — TELEPHONE ENCOUNTER
Patient called back and left a voicemail stating that she will have to reschedule labs because she is going out of the country. She leaves this weekend and will be back at the end of February. She would like to complete them in March.

## 2020-02-05 RX ORDER — PROPRANOLOL HCL 60 MG
CAPSULE, EXTENDED RELEASE 24HR ORAL
Qty: 90 CAPSULE | Refills: 0 | Status: SHIPPED | OUTPATIENT
Start: 2020-02-05 | End: 2020-03-12 | Stop reason: ALTCHOICE

## 2020-02-05 RX ORDER — PROPRANOLOL HCL 60 MG
CAPSULE, EXTENDED RELEASE 24HR ORAL
Qty: 90 CAPSULE | Refills: 1 | Status: SHIPPED | OUTPATIENT
Start: 2020-02-05 | End: 2020-06-16 | Stop reason: SDUPTHER

## 2020-02-05 RX ORDER — PAROXETINE 10 MG/1
10 TABLET, FILM COATED ORAL EVERY MORNING
Qty: 30 TABLET | Refills: 3 | Status: SHIPPED | OUTPATIENT
Start: 2020-02-05 | End: 2020-06-05

## 2020-02-05 NOTE — TELEPHONE ENCOUNTER
Please advise patient she should not be taking both oral diclofenac and topical. This interacts with Prozac and causes increased risk of bleeding. She should take one or the other.

## 2020-02-05 NOTE — TELEPHONE ENCOUNTER
Pt is requesting medication refill.  Please approve or deny this request.    Rx requested:  Requested Prescriptions     Pending Prescriptions Disp Refills    PARoxetine (PAXIL) 10 MG tablet 30 tablet 3     Sig: Take 1 tablet by mouth every morning    propranolol (INDERAL LA) 60 MG extended release capsule 90 capsule 1     Sig: TAKE ONE CAPSULE BY MOUTH EVERY DAY    diclofenac (VOLTAREN) 50 MG EC tablet       Sig: Take 1 tablet by mouth daily    diclofenac sodium 1 % GEL 1 Tube          Last Office Visit:   1/22/2020      Next Visit Date:  Future Appointments   Date Time Provider hospitals   3/12/2020 10:45 AM Ernestina Duane,  [address]   6/16/2020 11:30 AM Joelle Barone MD [facility]   9/8/2020 10:00 AM INO Walters - CNP [address]

## 2020-02-05 NOTE — TELEPHONE ENCOUNTER
First attempt to reach patient. Called patient @ [phone] and left message on machine for patient to return call during normal business hours of 8:30 AM and 5 PM @ [phone] option 2.

## 2020-02-06 NOTE — TELEPHONE ENCOUNTER
Patient aware. Patient states that Dr. Jose Rafael Briones has her on both medications. She states that we can contact his office with any issues. FYI.

## 2020-02-07 ENCOUNTER — TELEPHONE (OUTPATIENT)
Dept: FAMILY MEDICINE CLINIC | Age: 70
End: 2020-02-07

## 2020-03-04 ENCOUNTER — TELEPHONE (OUTPATIENT)
Dept: FAMILY MEDICINE CLINIC | Age: 70
End: 2020-03-04

## 2020-03-04 NOTE — TELEPHONE ENCOUNTER
----- Message from Sridevi Galindo MA sent at 2/5/2020 12:28 PM EST -----  Remind patient to complete labs.

## 2020-03-12 ENCOUNTER — OFFICE VISIT (OUTPATIENT)
Dept: CARDIOLOGY CLINIC | Age: 70
End: 2020-03-12
Payer: MEDICARE

## 2020-03-12 VITALS
BODY MASS INDEX: 27.46 KG/M2 | RESPIRATION RATE: 18 BRPM | SYSTOLIC BLOOD PRESSURE: 122 MMHG | DIASTOLIC BLOOD PRESSURE: 78 MMHG | HEIGHT: 63 IN | OXYGEN SATURATION: 98 % | WEIGHT: 155 LBS | HEART RATE: 62 BPM

## 2020-03-12 DIAGNOSIS — E04.1 HOT THYROID NODULE: ICD-10-CM

## 2020-03-12 DIAGNOSIS — E53.8 FOLATE DEFICIENCY: ICD-10-CM

## 2020-03-12 PROBLEM — R00.2 HEART PALPITATIONS: Status: ACTIVE | Noted: 2020-03-12

## 2020-03-12 LAB
FOLATE: >20 NG/ML (ref 7.3–26.1)
T4 FREE: 1.1 NG/DL (ref 0.84–1.68)
TSH SERPL DL<=0.05 MIU/L-ACNC: 0.03 UIU/ML (ref 0.44–3.86)
VITAMIN B-12: 397 PG/ML (ref 232–1245)

## 2020-03-12 PROCEDURE — 3017F COLORECTAL CA SCREEN DOC REV: CPT | Performed by: INTERNAL MEDICINE

## 2020-03-12 PROCEDURE — 4040F PNEUMOC VAC/ADMIN/RCVD: CPT | Performed by: INTERNAL MEDICINE

## 2020-03-12 PROCEDURE — 99213 OFFICE O/P EST LOW 20 MIN: CPT | Performed by: INTERNAL MEDICINE

## 2020-03-12 PROCEDURE — G8399 PT W/DXA RESULTS DOCUMENT: HCPCS | Performed by: INTERNAL MEDICINE

## 2020-03-12 PROCEDURE — 1090F PRES/ABSN URINE INCON ASSESS: CPT | Performed by: INTERNAL MEDICINE

## 2020-03-12 PROCEDURE — G8427 DOCREV CUR MEDS BY ELIG CLIN: HCPCS | Performed by: INTERNAL MEDICINE

## 2020-03-12 PROCEDURE — G8482 FLU IMMUNIZE ORDER/ADMIN: HCPCS | Performed by: INTERNAL MEDICINE

## 2020-03-12 PROCEDURE — G8417 CALC BMI ABV UP PARAM F/U: HCPCS | Performed by: INTERNAL MEDICINE

## 2020-03-12 PROCEDURE — 1036F TOBACCO NON-USER: CPT | Performed by: INTERNAL MEDICINE

## 2020-03-12 PROCEDURE — 1123F ACP DISCUSS/DSCN MKR DOCD: CPT | Performed by: INTERNAL MEDICINE

## 2020-03-12 ASSESSMENT — ENCOUNTER SYMPTOMS
SHORTNESS OF BREATH: 0
BLOOD IN STOOL: 0
DIARRHEA: 0
NAUSEA: 0
VOMITING: 0
COLOR CHANGE: 0
CHEST TIGHTNESS: 0
APNEA: 0

## 2020-03-12 NOTE — PROGRESS NOTES
[facility] CARDIOLOGY OFFICE FOLLOW-UP      Patient: Yonas Chaney  YOB: 1950  MRN: [id number]    Chief Complaint:  Chief Complaint   Patient presents with    1 Year Follow Up    Hypertension       She will see me in 1 year  Subjective/HPI:  3/12/2020: Patient presents today for follow-up of hypertension. 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 Overall doing well. She was on vacation in [facility].  Had 1 episode of palpitation that lasted almost a whole day. No excessive alcohol use. I have told her if that happens she can always take an extra metoprolol for that day. She will see me in 1 year     3/7/19: Patient presents today for follow-up of hypertension. Remains extremely well-controlled. On a combination of losartan hydrochlorothiazide and Inderal. No chest pain congestive heart failure symptoms. I had sent her to Dr. Andi Brewster for primary. 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 See me in one year        3/8/2018: Patient presents today for Evaluation of hypertension. Doing well no chest pain no shortness of breath is still gets occasional palpitations. Has a history of GERD which is stable. See me in one year.        11/17/16: For fu of HTN. Saw me 2 weeks ago. Was having allergy?/bronchitis. Treated with claritin,singulai and ATB. Still some residual symptoms. Get CXR today. See me in [facility]     11/3/16: For fu of HTN. Long standing. Retired nurse at [employer]. On losartan for ever. Has allergy. Gets around Oct.Couch,nasal congestion. I dont think losartan allergy. Give claritin,singulair and Z andre. See in 2 weeks.     12/24/15: Doing well. BP controlled. See in 1 year. Will see Andi Brewster for primary care.     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 Hypertensive. On ARB/Bystolic for a longtime. Will set up with Andi Brewster for primary care. Renew meds for 1 year. See in 6m.         Past Medical History:   Diagnosis Date    Bronchitis with bronchospasm 5/24/2019    Bursitis of both hips     Cancer ([address]     lung

## 2020-03-13 DIAGNOSIS — E05.90 HYPERTHYROIDISM: ICD-10-CM

## 2020-03-16 LAB — THYROID PEROXIDASE (TPO) ABS: <10 IU/ML (ref 0–35)

## 2020-03-20 LAB
LV EF: 65 %
LVEF MODALITY: NORMAL

## 2020-04-29 ENCOUNTER — HOSPITAL ENCOUNTER (OUTPATIENT)
Dept: ULTRASOUND IMAGING | Age: 70
Discharge: HOME | End: 2020-05-01
Payer: MEDICARE

## 2020-04-29 PROCEDURE — 76536 US EXAM OF HEAD AND NECK: CPT

## 2020-06-05 ENCOUNTER — VIRTUAL VISIT (OUTPATIENT)
Dept: PRIMARY CARE CLINIC | Age: 70
End: 2020-06-05
Payer: MEDICARE

## 2020-06-05 PROBLEM — J40 BRONCHITIS: Status: ACTIVE | Noted: 2019-05-24

## 2020-06-05 PROCEDURE — 1123F ACP DISCUSS/DSCN MKR DOCD: CPT | Performed by: NURSE PRACTITIONER

## 2020-06-05 PROCEDURE — 99213 OFFICE O/P EST LOW 20 MIN: CPT | Performed by: NURSE PRACTITIONER

## 2020-06-05 PROCEDURE — G8399 PT W/DXA RESULTS DOCUMENT: HCPCS | Performed by: NURSE PRACTITIONER

## 2020-06-05 PROCEDURE — 1090F PRES/ABSN URINE INCON ASSESS: CPT | Performed by: NURSE PRACTITIONER

## 2020-06-05 PROCEDURE — 3017F COLORECTAL CA SCREEN DOC REV: CPT | Performed by: NURSE PRACTITIONER

## 2020-06-05 PROCEDURE — 4040F PNEUMOC VAC/ADMIN/RCVD: CPT | Performed by: NURSE PRACTITIONER

## 2020-06-05 PROCEDURE — G8427 DOCREV CUR MEDS BY ELIG CLIN: HCPCS | Performed by: NURSE PRACTITIONER

## 2020-06-05 RX ORDER — PAROXETINE HYDROCHLORIDE 20 MG/1
20 TABLET, FILM COATED ORAL NIGHTLY
Qty: 30 TABLET | Refills: 0
Start: 2020-06-05 | End: 2021-02-22 | Stop reason: SDUPTHER

## 2020-06-05 RX ORDER — LORATADINE 10 MG/1
10 TABLET ORAL NIGHTLY
Qty: 90 TABLET | Refills: 1 | Status: SHIPPED | OUTPATIENT
Start: 2020-06-05

## 2020-06-05 RX ORDER — FOLIC ACID 1 MG/1
1 TABLET ORAL DAILY
Qty: 90 TABLET | Refills: 1 | Status: SHIPPED | OUTPATIENT
Start: 2020-06-05 | End: 2020-11-05 | Stop reason: SDUPTHER

## 2020-06-05 RX ORDER — CHOLECALCIFEROL (VITAMIN D3) 50 MCG
2000 TABLET ORAL DAILY
Qty: 90 TABLET | Refills: 1 | Status: SHIPPED | OUTPATIENT
Start: 2020-06-05

## 2020-06-05 ASSESSMENT — PATIENT HEALTH QUESTIONNAIRE - PHQ9
2. FEELING DOWN, DEPRESSED OR HOPELESS: 0
SUM OF ALL RESPONSES TO PHQ9 QUESTIONS 1 & 2: 0
1. LITTLE INTEREST OR PLEASURE IN DOING THINGS: 0
SUM OF ALL RESPONSES TO PHQ QUESTIONS 1-9: 0
SUM OF ALL RESPONSES TO PHQ QUESTIONS 1-9: 0

## 2020-06-05 ASSESSMENT — ENCOUNTER SYMPTOMS
CONSTIPATION: 0
NAUSEA: 0
SHORTNESS OF BREATH: 0
ABDOMINAL PAIN: 0
DIARRHEA: 0
CHEST TIGHTNESS: 0
VOMITING: 0

## 2020-06-05 NOTE — PROGRESS NOTES
 folic acid (FOLVITE) 1 MG tablet     Sig: Take 1 tablet by mouth daily     Dispense:  90 tablet     Refill:  1    Cholecalciferol (VITAMIN D) 50 MCG (2000 UT) TABS tablet     Sig: Take 1 tablet by mouth daily     Dispense:  90 tablet     Refill:  1    PARoxetine (PAXIL) 20 MG tablet     Sig: Take 1 tablet by mouth nightly     Dispense:  30 tablet     Refill:  0    loratadine (CLARITIN) 10 MG tablet     Sig: Take 1 tablet by mouth nightly     Dispense:  90 tablet     Refill:  1    esomeprazole (NEXIUM) 20 MG delayed release capsule     Sig: Take 1 capsule by mouth every morning (before breakfast)     Dispense:  90 capsule     Refill:  1       Return in about 3 months (around 9/5/2020). Allergies: Continue current treatment    Vitamin Deficiency: Currently well controlled continue current treatment. Insomnia: Patient is going to talk with Dr. Martini as her TSH is a little off she thinks it may be related to her symptoms. TSH is normal. If he feels it is unrelated then we will explore other options and management. GERD continue current treatment as symptoms are well controlled. Anxiety/Dpn: Continue current treatment. Patient to follow with INO Graves CNP for all chronic condition management    Reviewed with the patient: current clinical status, medications, activities and diet. Side effects, adverse effects of the medicationprescribed today, as well as treatment plan/ rationale and result expectations have been discussed with the patient who expresses understanding and desires to proceed. Close follow up to evaluate treatment resultsand for coordination of care. I have reviewed the patient's medical history in detail and updated the computerized patient record.     INO Graves CNP      Future Appointments   Date Time Provider Roxy Garcia   6/16/2020 11:30 AM Norah Root MD [facility]   7/16/2020 11:45 AM Cheli Pack MD [facility]

## 2020-06-16 ENCOUNTER — VIRTUAL VISIT (OUTPATIENT)
Dept: ENDOCRINOLOGY | Age: 70
End: 2020-06-16
Payer: MEDICARE

## 2020-06-16 PROCEDURE — G8399 PT W/DXA RESULTS DOCUMENT: HCPCS | Performed by: INTERNAL MEDICINE

## 2020-06-16 PROCEDURE — 1090F PRES/ABSN URINE INCON ASSESS: CPT | Performed by: INTERNAL MEDICINE

## 2020-06-16 PROCEDURE — 99213 OFFICE O/P EST LOW 20 MIN: CPT | Performed by: INTERNAL MEDICINE

## 2020-06-16 PROCEDURE — 4040F PNEUMOC VAC/ADMIN/RCVD: CPT | Performed by: INTERNAL MEDICINE

## 2020-06-16 PROCEDURE — 3017F COLORECTAL CA SCREEN DOC REV: CPT | Performed by: INTERNAL MEDICINE

## 2020-06-16 PROCEDURE — G8428 CUR MEDS NOT DOCUMENT: HCPCS | Performed by: INTERNAL MEDICINE

## 2020-06-16 PROCEDURE — 1123F ACP DISCUSS/DSCN MKR DOCD: CPT | Performed by: INTERNAL MEDICINE

## 2020-06-16 RX ORDER — PROPRANOLOL HCL 60 MG
CAPSULE, EXTENDED RELEASE 24HR ORAL
Qty: 90 CAPSULE | Refills: 1 | Status: SHIPPED | OUTPATIENT
Start: 2020-06-16 | End: 2020-10-13 | Stop reason: SDUPTHER

## 2020-06-16 RX ORDER — METHIMAZOLE 5 MG/1
TABLET ORAL
Qty: 15 TABLET | Refills: 3 | Status: SHIPPED | OUTPATIENT
Start: 2020-06-16 | End: 2020-10-13 | Stop reason: SDUPTHER

## 2020-06-16 NOTE — PROGRESS NOTES
2020    TELEHEALTH EVALUATION -- Audio/Visual (During GWLUW-32 public health emergency)    Due to [place] 19 outbreak, patient's office visit was converted to a virtual visit. Patient was contacted and agreed to proceed with a virtual visit via Doxy. me  The risks and benefits of converting to a virtual visit were discussed in light of the current infectious disease epidemic. Patient also understood that insurance coverage and co-pays are up to their individual insurance plans. HPI: Patient made aware is video visit billable visit agreed to proceed patient was at home I was at my office    Ronaldo Alba (:  1950) has requested an audio/video evaluation for the following concern(s):    Follow-up on goiter heart thyroid nodule and hypothyroidism patient still complains of palpitation is on Inderal LA 60 mg daily also has had issues with insomnia thyroid function still show persistent suppression of TSH free T4 in the normal range      Narrative   EXAMINATION: US HEAD NECK SOFT TISSUE THYROID       DATE AND TIME:2020 12:40 PM       CLINICAL HISTORY: Thyromegaly E04.1 Hot thyroid nodule ICD10        COMPARISONS: 2019        TECHNIQUE: Biplanar images were obtained.  Please note that description of thyroid nodules in this report is based on the 2017 Thyroid Imaging, Reporting and Data System (TI- RADS) established by the [employer] of radiology to help provide guidance    regarding management of thyroid nodules based on their appearance.  These are offered in an attempt to  assist  the referring physician in their decision process and help address the current over diagnosis of thyroid cancer.  These guidelines are    considered recommendations and guidance and do not represent rules or absolute standards of care           FINDINGS: Right lobe: 5.5 cm.                               Left lobe:  4.5 cm.                            Isthmus:  0.2 cm.

## 2020-07-16 ENCOUNTER — OFFICE VISIT (OUTPATIENT)
Dept: CARDIOLOGY CLINIC | Age: 70
End: 2020-07-16
Payer: MEDICARE

## 2020-07-16 VITALS
DIASTOLIC BLOOD PRESSURE: 78 MMHG | WEIGHT: 153.8 LBS | HEIGHT: 63 IN | SYSTOLIC BLOOD PRESSURE: 122 MMHG | RESPIRATION RATE: 18 BRPM | HEART RATE: 56 BPM | BODY MASS INDEX: 27.25 KG/M2 | OXYGEN SATURATION: 96 %

## 2020-07-16 PROCEDURE — G8427 DOCREV CUR MEDS BY ELIG CLIN: HCPCS | Performed by: INTERNAL MEDICINE

## 2020-07-16 PROCEDURE — 3017F COLORECTAL CA SCREEN DOC REV: CPT | Performed by: INTERNAL MEDICINE

## 2020-07-16 PROCEDURE — G8417 CALC BMI ABV UP PARAM F/U: HCPCS | Performed by: INTERNAL MEDICINE

## 2020-07-16 PROCEDURE — 99213 OFFICE O/P EST LOW 20 MIN: CPT | Performed by: INTERNAL MEDICINE

## 2020-07-16 PROCEDURE — 1090F PRES/ABSN URINE INCON ASSESS: CPT | Performed by: INTERNAL MEDICINE

## 2020-07-16 PROCEDURE — 1123F ACP DISCUSS/DSCN MKR DOCD: CPT | Performed by: INTERNAL MEDICINE

## 2020-07-16 PROCEDURE — 4040F PNEUMOC VAC/ADMIN/RCVD: CPT | Performed by: INTERNAL MEDICINE

## 2020-07-16 PROCEDURE — 1036F TOBACCO NON-USER: CPT | Performed by: INTERNAL MEDICINE

## 2020-07-16 PROCEDURE — G8399 PT W/DXA RESULTS DOCUMENT: HCPCS | Performed by: INTERNAL MEDICINE

## 2020-07-16 RX ORDER — AMOXICILLIN AND CLAVULANATE POTASSIUM 875; 125 MG/1; MG/1
1 TABLET, FILM COATED ORAL 2 TIMES DAILY
Qty: 14 TABLET | Refills: 0 | Status: SHIPPED | OUTPATIENT
Start: 2020-07-16 | End: 2020-07-23

## 2020-07-16 ASSESSMENT — ENCOUNTER SYMPTOMS
CHEST TIGHTNESS: 0
VOMITING: 0
SHORTNESS OF BREATH: 0
APNEA: 0
NAUSEA: 0
BLOOD IN STOOL: 0
DIARRHEA: 0

## 2020-07-16 NOTE — PROGRESS NOTES
[facility] CARDIOLOGY OFFICE FOLLOW-UP      Patient: Arianne Quijano  YOB: 1950  MRN: [id number]    Chief Complaint:  Chief Complaint   Patient presents with    Follow-up     4 month f/u    Hypertension    Palpitations         Subjective/HPI:  7/16/2020: Patient presents today for follow-up of hypertension. And palpitation. She has postnasal discharge. No fever. Occasional and rare she will get some palpitation. No CHF. Non-smoker. 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 Give a prescription for this Augmentin 875 twice daily for 2 weeks and follow with Dr. Nicholas Guerrero. Stress test is negative       3/12/2020: Patient presents today for follow-up of hypertension. 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 Nicholas Guerrero. Overall doing well. She was on vacation in [facility].  Had 1 episode of palpitation that lasted almost a whole day. No excessive alcohol use. I have told her if that happens she can always take an extra metoprolol for that day. She will see me in 1 year     3/7/19: Patient presents today for follow-up of hypertension. Remains extremely well-controlled. On a combination of losartan hydrochlorothiazide and Inderal. No chest pain congestive heart failure symptoms. I had sent her to Dr. Nicholas Guerrero for primary. Retired nurse from [address] [place] [address]. See me in one year        3/8/2018: Patient presents today for Evaluation of hypertension. Doing well no chest pain no shortness of breath is still gets occasional palpitations. Has a history of GERD which is stable. See me in one year.        11/17/16: For fu of HTN. Saw me 2 weeks ago. Was having allergy?/bronchitis. Treated with claritin,singulai and ATB. Still some residual symptoms. Get CXR today. See me in [facility]     11/3/16: For fu of HTN. Long standing. Retired nurse at [employer]. On losartan for ever. Has allergy. Gets around Oct.Couch,nasal congestion. I dont think losartan allergy. Give claritin,singulair and Z andre. See in 2 weeks.     12/24/15: Doing well. BP controlled. See in 1 year. Will see Catarina Dang for primary care.     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 Hypertensive. On ARB/Bystolic for a longtime. Will set up with Catarina Dang for primary care. Renew meds for 1 year. See in 6m.       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    Contusion of right eyelid 2019    Cough 2016    Depression     Dizziness 2015    Dizziness 2015    Gastritis with hemorrhage     GERD (gastroesophageal reflux disease)     Heart palpitations 3/12/2020    Hereditary motor and sensory neuropathy     Hypertension     Hypertension     Need for pneumococcal vaccination 2019    Osteoarthritis     Seasonal allergies 11/3/2016    SOB (shortness of breath) 2015    SOB (shortness of breath) 2015       Past Surgical History:   Procedure Laterality Date    ABDOMEN SURGERY      CATARACT REMOVAL WITH IMPLANT  2018    bilateral  and      SECTION      FINGER REPLANTATION      right thumb implant due to arthritis    HYSTERECTOMY  1993    KNEE ARTHROSCOPY      bilateral    LOBECTOMY  1974    lower back    NOSE SURGERY      sinus    TUBAL LIGATION      TUMOR REMOVAL  1973    2x on back side of neck       Family History   Adopted: Yes   Problem Relation Age of Onset    Other Mother     Other Sister     Colon Cancer Brother        Social History     Socioeconomic History    Marital status:      Spouse name: None    Number of children: None    Years of education: None    Highest education level: None   Occupational History    None   Social Needs    Financial resource strain: None    Food insecurity     Worry: None     Inability: None    Transportation needs     Medical: None     Non-medical: None   Tobacco Use    Smoking status: Former Smoker     Packs/day: 1.00     Years: 21.00     Pack years: 21.00    Smokeless tobacco: Never Used   Substance and Sexual Activity    Alcohol use: Yes     Alcohol/week: 0.0 standard drinks     Comment: occassionally    Drug use: No    Sexual activity: None   Lifestyle    Physical activity     Days per week: None     Minutes per session: None    Stress: None   Relationships    Social connections     Talks on phone: None     Gets together: None     Attends L[employer] service: None     Active member of club or organization: None     Attends meetings of clubs or organizations: None     Relationship status: None    Intimate partner violence     Fear of current or ex partner: None     Emotionally abused: None     Physically abused: None     Forced sexual activity: None   Other Topics Concern    None   Social History Narrative    None       Allergies   Allergen Reactions    Meperidine Other (See Comments)     Upset stomach    Morphine Other (See Comments)     Upset stomach    Nasal Spray     Percocet [Oxycodone-Acetaminophen]      Upset stomach       Current Outpatient Medications   Medication Sig Dispense Refill    amoxicillin-clavulanate (AUGMENTIN) 875-125 MG per tablet Take 1 tablet by mouth 2 times daily for 7 days 14 tablet 0    propranolol (INDERAL LA) 60 MG extended release capsule TAKE ONE CAPSULE BY MOUTH EVERY DAY 90 capsule 1    folic acid (FOLVITE) 1 MG tablet Take 1 tablet by mouth daily 90 tablet 1    Cholecalciferol (VITAMIN D) 50 MCG (2000 UT) TABS tablet Take 1 tablet by mouth daily 90 tablet 1    PARoxetine (PAXIL) 20 MG tablet Take 1 tablet by mouth nightly 30 tablet 0    loratadine (CLARITIN) 10 MG tablet Take 1 tablet by mouth nightly 90 tablet 1    esomeprazole (NEXIUM) 20 MG delayed release capsule Take 1 capsule by mouth every morning (before breakfast) 90 capsule 1    losartan-hydrochlorothiazide (HYZAAR) 100-12.5 MG per tablet TAKE ONE TABLET BY MOUTH DAILY.  discontinue hyzaar-hctz 50-12.5 mg 90 tablet 1    diclofenac sodium 1 % GEL       methIMAzole (TAPAZOLE) 5 MG tablet 1/2 po once a week (Patient not taking: Reported on 7/16/2020) 15 tablet 3    diclofenac (VOLTAREN) 50 MG EC tablet Take 1 tablet by mouth 2 times daily 60 tablet 0     No current facility-administered medications for this visit. Review of Systems:   Review of Systems   Constitutional: Negative for diaphoresis and fatigue. HENT: Negative for nosebleeds. Respiratory: Negative for apnea, chest tightness and shortness of breath. Cardiovascular: Negative for chest pain, palpitations and leg swelling. Gastrointestinal: Negative for blood in stool, diarrhea, nausea and vomiting. Musculoskeletal: Negative for myalgias, neck pain and neck stiffness. Neurological: Negative for dizziness, seizures, syncope, weakness, light-headedness, numbness and headaches. Hematological: Does not bruise/bleed easily. Psychiatric/Behavioral: Negative for confusion. The patient is not nervous/anxious. All other systems reviewed and are negative. Review of System is negative except for as mentioned above. Physical Examination:    /78 (Site: Right Upper Arm, Position: Sitting, Cuff Size: Medium Adult)   Pulse 56   Resp 18   Ht 5' 3\" (1.6 m)   Wt 153 lb 12.8 oz (69.8 kg)   SpO2 96%   BMI 27.24 kg/m²    Physical Exam        Patient Active Problem List   Diagnosis    Depressive disorder    Diverticulosis of large intestine    Gastroesophageal reflux disease    Angiosarcoma (HCC)    Hypertensive disorder    Seasonal allergies    Bilateral inguinal hernia    Thyroid nodule    Goiter, toxic, multinodular    Bronchitis    At high risk for falls    Ataxia    Recurrent falls    Hyponatremia    Hypercalcemia    Hypernatremia    Muscle pain    Palpitations           No orders of the defined types were placed in this encounter.         Orders Placed This Encounter   Medications    amoxicillin-clavulanate (AUGMENTIN) 875-125 MG per tablet Sig: Take 1 tablet by mouth 2 times daily for 7 days     Dispense:  14 tablet     Refill:  0               Assessment:    1. Essential hypertension    2. Heart palpitations         Plan:   Patient given Augmentin 875mg Twice a day for 7 days and is to follow with Dr. Angela Joshi. Stay on same medications. See me in 6 months. This note was partially generated using Dragon voice recognition system, and there may be some incorrect words, spellings, punctuation that were not noticed in checking the note before saving.         Electronically signed by Bandar Rey MD on 7/16/2020 at 1:53 PM

## 2020-07-28 RX ORDER — LOSARTAN POTASSIUM AND HYDROCHLOROTHIAZIDE 12.5; 1 MG/1; MG/1
TABLET ORAL
Qty: 90 TABLET | Refills: 3 | Status: SHIPPED | OUTPATIENT
Start: 2020-07-28 | End: 2021-05-27 | Stop reason: SDUPTHER

## 2020-07-28 NOTE — TELEPHONE ENCOUNTER
pharmacy requesting medication refill.  Please approve or deny this request.    Rx requested:  Requested Prescriptions     Pending Prescriptions Disp Refills    losartan-hydroCHLOROthiazide (HYZAAR) 100-12.5 MG per tablet [Pharmacy Med Name: LOSARTAN/HCTZ 100-12.5MG TABLET] 90 tablet 3     Sig: TAKE 1 TABLET BY MOUTH  DAILY         Last Office Visit:   1/22/2020      Next Visit Date:  Future Appointments   Date Time Provider [facility]   9/8/2020  1:00 PM Olivia Jensen MD [facility]   9/17/2020 11:30 AM Ward Tripathi MD [address] [place] [address]   1/21/2021 12:00 PM Patricia Deleon MD [facility]

## 2020-09-08 ENCOUNTER — OFFICE VISIT (OUTPATIENT)
Dept: PRIMARY CARE CLINIC | Age: 70
End: 2020-09-08
Payer: MEDICARE

## 2020-09-08 VITALS
RESPIRATION RATE: 14 BRPM | WEIGHT: 154 LBS | HEART RATE: 63 BPM | DIASTOLIC BLOOD PRESSURE: 73 MMHG | BODY MASS INDEX: 27.29 KG/M2 | OXYGEN SATURATION: 94 % | HEIGHT: 63 IN | SYSTOLIC BLOOD PRESSURE: 126 MMHG

## 2020-09-08 DIAGNOSIS — I10 ESSENTIAL HYPERTENSION: ICD-10-CM

## 2020-09-08 LAB
ALBUMIN SERPL-MCNC: 4.2 G/DL (ref 3.5–4.6)
ALP BLD-CCNC: 89 U/L (ref 40–130)
ALT SERPL-CCNC: 12 U/L (ref 0–33)
ANION GAP SERPL CALCULATED.3IONS-SCNC: 10 MEQ/L (ref 9–15)
AST SERPL-CCNC: 16 U/L (ref 0–35)
BILIRUB SERPL-MCNC: 0.5 MG/DL (ref 0.2–0.7)
BUN BLDV-MCNC: 28 MG/DL (ref 8–23)
CALCIUM SERPL-MCNC: 10.8 MG/DL (ref 8.5–9.9)
CHLORIDE BLD-SCNC: 103 MEQ/L (ref 95–107)
CO2: 28 MEQ/L (ref 20–31)
CREAT SERPL-MCNC: 0.76 MG/DL (ref 0.5–0.9)
GFR AFRICAN AMERICAN: >60
GFR NON-AFRICAN AMERICAN: >60
GLOBULIN: 2.4 G/DL (ref 2.3–3.5)
GLUCOSE BLD-MCNC: 80 MG/DL (ref 70–99)
POTASSIUM SERPL-SCNC: 4.5 MEQ/L (ref 3.4–4.9)
SODIUM BLD-SCNC: 141 MEQ/L (ref 135–144)
TOTAL PROTEIN: 6.6 G/DL (ref 6.3–8)

## 2020-09-08 PROCEDURE — 4040F PNEUMOC VAC/ADMIN/RCVD: CPT | Performed by: INTERNAL MEDICINE

## 2020-09-08 PROCEDURE — G0439 PPPS, SUBSEQ VISIT: HCPCS | Performed by: INTERNAL MEDICINE

## 2020-09-08 PROCEDURE — 1123F ACP DISCUSS/DSCN MKR DOCD: CPT | Performed by: INTERNAL MEDICINE

## 2020-09-08 PROCEDURE — 3017F COLORECTAL CA SCREEN DOC REV: CPT | Performed by: INTERNAL MEDICINE

## 2020-09-08 SDOH — ECONOMIC STABILITY: INCOME INSECURITY: HOW HARD IS IT FOR YOU TO PAY FOR THE VERY BASICS LIKE FOOD, HOUSING, MEDICAL CARE, AND HEATING?: NOT HARD AT ALL

## 2020-09-08 SDOH — ECONOMIC STABILITY: FOOD INSECURITY: WITHIN THE PAST 12 MONTHS, YOU WORRIED THAT YOUR FOOD WOULD RUN OUT BEFORE YOU GOT MONEY TO BUY MORE.: NEVER TRUE

## 2020-09-08 SDOH — ECONOMIC STABILITY: FOOD INSECURITY: WITHIN THE PAST 12 MONTHS, THE FOOD YOU BOUGHT JUST DIDN'T LAST AND YOU DIDN'T HAVE MONEY TO GET MORE.: NEVER TRUE

## 2020-09-08 ASSESSMENT — LIFESTYLE VARIABLES
HOW OFTEN DURING THE LAST YEAR HAVE YOU FOUND THAT YOU WERE NOT ABLE TO STOP DRINKING ONCE YOU HAD STARTED: 0
HAVE YOU OR SOMEONE ELSE BEEN INJURED AS A RESULT OF YOUR DRINKING: 0
HOW OFTEN DURING THE LAST YEAR HAVE YOU HAD A FEELING OF GUILT OR REMORSE AFTER DRINKING: 0
HOW OFTEN DO YOU HAVE SIX OR MORE DRINKS ON ONE OCCASION: 0
AUDIT TOTAL SCORE: 1
HAS A RELATIVE, FRIEND, DOCTOR, OR ANOTHER HEALTH PROFESSIONAL EXPRESSED CONCERN ABOUT YOUR DRINKING OR SUGGESTED YOU CUT DOWN: 0
HOW OFTEN DURING THE LAST YEAR HAVE YOU NEEDED AN ALCOHOLIC DRINK FIRST THING IN THE MORNING TO GET YOURSELF GOING AFTER A NIGHT OF HEAVY DRINKING: 0
HOW MANY STANDARD DRINKS CONTAINING ALCOHOL DO YOU HAVE ON A TYPICAL DAY: 0
HOW OFTEN DURING THE LAST YEAR HAVE YOU BEEN UNABLE TO REMEMBER WHAT HAPPENED THE NIGHT BEFORE BECAUSE YOU HAD BEEN DRINKING: 0
HOW OFTEN DO YOU HAVE A DRINK CONTAINING ALCOHOL: 1
AUDIT-C TOTAL SCORE: 1
HOW OFTEN DURING THE LAST YEAR HAVE YOU FAILED TO DO WHAT WAS NORMALLY EXPECTED FROM YOU BECAUSE OF DRINKING: 0

## 2020-09-08 NOTE — PROGRESS NOTES
Keyonna Montgomery 65 y.o. female presents today with   Chief Complaint   Patient presents with    Medicare AWV       [facility]   on 5 mg of tapazole, she was to be on .     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    Contusion of right eyelid 2019    Cough 2016    Depression     Dizziness 2015    Dizziness 2015    Gastritis with hemorrhage     GERD (gastroesophageal reflux disease)     Heart palpitations 3/12/2020    Hereditary motor and sensory neuropathy     Hypertension     Hypertension     Need for pneumococcal vaccination 2019    Osteoarthritis     Seasonal allergies 11/3/2016    SOB (shortness of breath) 2015    SOB (shortness of breath) 2015     Patient Active Problem List    Diagnosis Date Noted    Palpitations 2020    Hyponatremia 2019    Hypercalcemia 2019    Hypernatremia 2019    Muscle pain 2019    Ataxia 2019    Recurrent falls 2019    Bronchitis 2019    At high risk for falls 2019    Goiter, toxic, multinodular 2018    Thyroid nodule 10/25/2018    Bilateral inguinal hernia 2017    Seasonal allergies 2016    Depressive disorder 2013    Diverticulosis of large intestine 2013    Gastroesophageal reflux disease 2013    Angiosarcoma ([facility] 2013    Hypertensive disorder 2013     Past Surgical History:   Procedure Laterality Date    ABDOMEN SURGERY      CATARACT REMOVAL WITH IMPLANT  2018    bilateral  and      SECTION      FINGER REPLANTATION      right thumb implant due to arthritis    HYSTERECTOMY      KNEE ARTHROSCOPY      bilateral    LOBECTOMY  1974    lower back    NOSE SURGERY      sinus    TUBAL LIGATION      TUMOR REMOVAL  1973    2x on back side of neck     Family examination at a health care facility    Essential hypertension  -     Comprehensive Metabolic Panel; Future    Screening mammogram, encounter for  -     [facility] DIGITAL SCREEN SELF REFERRAL W OR WO CAD BILATERAL; Future        Return in 1 year (on 2021), or if symptoms worsen or fail to improve, for Medicare Annual Wellness Visit in 1 year. Kiana Holt MD  Medicare Annual Wellness Visit  Name: Janie Babb Date: 2020   MRN: [id number] Sex: Female   Age: 65 y.o. Ethnicity: Non-/Non    : 1950 Race: Lucy Kumar is here for Medicare AWV    Screenings for behavioral, psychosocial and functional/safety risks, and cognitive dysfunction are all negative except as indicated below. These results, as well as other patient data from the [address] form, are documented in Flowsheets linked to this Encounter. Allergies   Allergen Reactions    Meperidine Other (See Comments)     Upset stomach    Morphine Other (See Comments)     Upset stomach    Nasal Spray     Percocet [Oxycodone-Acetaminophen]      Upset stomach         Prior to Visit Medications    Medication Sig Taking?  Authorizing Provider   losartan-hydroCHLOROthiazide (HYZAAR) 100-12.5 MG per tablet TAKE 1 TABLET BY MOUTH  DAILY Yes Susan Schrader MD   methIMAzole (TAPAZOLE) 5 MG tablet 1/2 po once a week Yes Franc Sanchez MD   propranolol (INDERAL LA) 60 MG extended release capsule TAKE ONE CAPSULE BY MOUTH EVERY DAY Yes Franc Sanchez MD   folic acid (FOLVITE) 1 MG tablet Take 1 tablet by mouth daily Yes INO Rodas CNP   Cholecalciferol (VITAMIN D) 50 MCG ( UT) TABS tablet Take 1 tablet by mouth daily Yes INO Rodas CNP   PARoxetine (PAXIL) 20 MG tablet Take 1 tablet by mouth nightly Yes INO Rodas CNP   loratadine (CLARITIN) 10 MG tablet Take 1 tablet by mouth nightly Yes INO Rodas CNP   esomeprazole (NEXIUM) 20 MG delayed release capsule Take 1 capsule by mouth every morning (before breakfast) Yes INO Rodas CNP   diclofenac sodium 1 % GEL  Yes Historical Provider, MD   diclofenac (VOLTAREN) 50 MG EC tablet Take 1 tablet by mouth 2 times daily  INO Tripathi CNP         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    Contusion of right eyelid 2019    Cough 2016    Depression     Dizziness 2015    Dizziness 2015    Gastritis with hemorrhage     GERD (gastroesophageal reflux disease)     Heart palpitations 3/12/2020    Hereditary motor and sensory neuropathy     Hypertension     Hypertension     Need for pneumococcal vaccination 2019    Osteoarthritis     Seasonal allergies 11/3/2016    SOB (shortness of breath) 2015    SOB (shortness of breath) 2015       Past Surgical History:   Procedure Laterality Date    ABDOMEN SURGERY      CATARACT REMOVAL WITH IMPLANT  2018    bilateral  and      SECTION      FINGER REPLANTATION      right thumb implant due to arthritis    HYSTERECTOMY      KNEE ARTHROSCOPY      bilateral    LOBECTOMY      lower back    NOSE SURGERY      sinus    TUBAL LIGATION      TUMOR REMOVAL  1973    2x on back side of neck         Family History   Adopted: Yes   Problem Relation Age of Onset    Other Mother     Other Sister     Colon Cancer Brother        CareTeam (Including outside providers/suppliers regularly involved in providing care):   Patient Care Team:  Anette Dasilva MD as PCP - General (Internal Medicine)  Anette Dasilva MD as PCP [facility] Empaneled Provider  Kwame Sánchez MD as Physician (Cardiology)    Wt Readings from Last 3 Encounters:   20 154 lb (69.9 kg)   20 153 lb 12.8 oz (69.8 kg)   20 155 lb (70.3 kg)     Vitals:    20 1253   BP: 126/73   Pulse: 63   Resp: 14   SpO2: 94% Weight: 154 lb (69.9 kg)   Height: 5' 3\" (1.6 m)     Body mass index is 27.28 kg/m². Based upon direct observation of the patient, evaluation of cognition reveals recent and remote memory intact. Patient's complete Health Risk Assessment and screening values have been reviewed and are found in Flowsheets. The following problems were reviewed today and where indicated follow up appointments were made and/or referrals ordered. Positive Risk Factor Screenings with Interventions:     Fall Risk:  2 or more falls in past year?: (!) yes  Fall with injury in past year?: no  Fall Risk Interventions:    · discussed    Health Habits/Nutrition:  Health Habits/Nutrition  Do you exercise for at least 20 minutes 2-3 times per week?: (!) No  Have you lost any weight without trying in the past 3 months?: No  Do you eat fewer than 2 meals per day?: (!) Yes  Have you seen a dentist within the past year?: Yes  Body mass index is 27.28 kg/m².   Health Habits/Nutrition Interventions:discussed    Hearing/Vision:  No exam data present  Hearing/Vision  Do you or your family notice any trouble with your hearing?: No  Do you have difficulty driving, watching TV, or doing any of your daily activities because of your eyesight?: (!) Yes  Have you had an eye exam within the past year?: Yes  Hearing/Vision Interventions:  · discussed    Safety:  Safety  Do you have working smoke detectors?: Yes  Have all throw rugs been removed or fastened?: (!) No  Do you have non-slip mats or surfaces in all bathtubs/showers?: Yes  Do all of your stairways have a railing or banister?: Yes  Are your doorways, halls and stairs free of clutter?: Yes  Do you always fasten your seatbelt when you are in a car?: Yes  Safety Interventions:  · discussed    Personalized Preventive Plan   Current Health Maintenance Status  Immunization History   Administered Date(s) Administered    Influenza, MDCK Quadv, IM, PF (Flucelvax 4 yrs and older) 10/05/2019    Influenza, Triv, inactivated, subunit, adjuvanted, IM (Fluad 65 yrs and older) 09/25/2018    Pneumococcal Conjugate 13-valent ([place]) 06/05/2019        Health Maintenance   Topic Date Due    DTaP/Tdap/Td vaccine (1 - Tdap) 03/12/1967    Shingles Vaccine (1 of 2) 03/12/1998    Annual Wellness Visit (AWV)  05/29/2019    Pneumococcal 65+ years Vaccine (2 of 2 - PPSV23) 06/05/2020    Flu vaccine (1) 09/01/2020    Breast cancer screen  11/16/2020    TSH testing  03/12/2021    Potassium monitoring  09/08/2021    Creatinine monitoring  09/08/2021    Colon cancer screen fecal DNA test (Cologuard)  06/03/2022    Lipid screen  07/31/2024    Hepatitis C screen  Completed    DEXA (modify frequency per FRAX score)  Addressed    Hepatitis A vaccine  Aged Out    Hepatitis B vaccine  Aged Out    Hib vaccine  Aged Out    Meningococcal (ACWY) vaccine  Aged Out     Recommendations for [employer] Due: see orders and patient instructions/AVS.  . Recommended screening schedule for the next 5-10 years is provided to the patient in written form: see Patient Capo Devi was seen today for medicare awv. Diagnoses and all orders for this visit:    Routine general medical examination at a health care facility    Essential hypertension  -     Comprehensive Metabolic Panel; Future    Screening mammogram, encounter for  -     [facility] DIGITAL SCREEN SELF REFERRAL W OR WO CAD BILATERAL;  Future

## 2020-09-15 NOTE — PATIENT INSTRUCTIONS
Personalized Preventive Plan for Noe Thakkar - 9/8/2020  Medicare offers a range of preventive health benefits. Some of the tests and screenings are paid in full while other may be subject to a deductible, co-insurance, and/or copay. Some of these benefits include a comprehensive review of your medical history including lifestyle, illnesses that may run in your family, and various assessments and screenings as appropriate. After reviewing your medical record and screening and assessments performed today your provider may have ordered immunizations, labs, imaging, and/or referrals for you. A list of these orders (if applicable) as well as your Preventive Care list are included within your After Visit Summary for your review. Other Preventive Recommendations:    · A preventive eye exam performed by an eye specialist is recommended every 1-2 years to screen for glaucoma; cataracts, macular degeneration, and other eye disorders. · A preventive dental visit is recommended every 6 months. · Try to get at least 150 minutes of exercise per week or 10,000 steps per day on a pedometer . · Order or download the FREE \"Exercise & Physical Activity: Your Everyday Guide\" from [employer]. Call 0-231.776.1153 or search [employer] online. · You need 9672-3635 mg of calcium and 6268-4565 IU of vitamin D per day. It is possible to meet your calcium requirement with diet alone, but a vitamin D supplement is usually necessary to meet this goal.  · When exposed to the sun, use a sunscreen that protects against both UVA and UVB radiation with an SPF of 30 or greater. Reapply every 2 to 3 hours or after sweating, drying off with a towel, or swimming. · Always wear a seat belt when traveling in a car. Always wear a helmet when riding a bicycle or motorcycle.

## 2020-09-17 ENCOUNTER — VIRTUAL VISIT (OUTPATIENT)
Dept: ENDOCRINOLOGY | Age: 70
End: 2020-09-17
Payer: MEDICARE

## 2020-09-17 PROCEDURE — 99442 PR PHYS/QHP TELEPHONE EVALUATION 11-20 MIN: CPT | Performed by: INTERNAL MEDICINE

## 2020-09-17 NOTE — PROGRESS NOTES
2020    TELEHEALTH EVALUATION -- Audio/Visual (During YEGCV-37 public health emergency)    Due to Roger 19 outbreak, patient's office visit was converted to a virtual visit. Patient was contacted and agreed to proceed with a virtual visit via Telephone Visit  The risks and benefits of converting to a virtual visit were discussed in light of the current infectious disease epidemic. Patient also understood that insurance coverage and co-pays are up to their individual insurance plans. HPI: Patient made aware telephone visit    Chris Arreguin (:  1950) has requested an audio/video evaluation for the following concern(s):    Mild hypothyroidism patient on propranolol 60 mg daily plus started on Tapazole 5 mg half a pill once a week patient was taking 1 pill once a week change back to half a pill once a week hypothyroidism secondary to thyroid nodule over functioning did not get labs done patient unsure whether the Tapazole is helping her symptoms anymore    Review of Systems    Prior to Visit Medications    Medication Sig Taking?  Authorizing Provider   losartan-hydroCHLOROthiazide (HYZAAR) 100-12.5 MG per tablet TAKE 1 TABLET BY MOUTH  DAILY  Debora Torres MD   methIMAzole (TAPAZOLE) 5 MG tablet 1/2 po once a week  Leslie Peck MD   propranolol (INDERAL LA) 60 MG extended release capsule TAKE ONE CAPSULE BY MOUTH EVERY DAY  Leslie Peck MD   folic acid (FOLVITE) 1 MG tablet Take 1 tablet by mouth daily  INO Rodas CNP   Cholecalciferol (VITAMIN D) 50 MCG ( UT) TABS tablet Take 1 tablet by mouth daily  INO Rodas CNP   PARoxetine (PAXIL) 20 MG tablet Take 1 tablet by mouth nightly  INO Rodas CNP   loratadine (CLARITIN) 10 MG tablet Take 1 tablet by mouth nightly  INO Rodas CNP   esomeprazole (NEXIUM) 20 MG delayed release capsule Take 1 capsule by mouth every morning (before breakfast)  INO Webb CNP   diclofenac (VOLTAREN) 50 MG EC tablet Take 1 tablet by mouth 2 times daily  Kaye Jiémnez APRN - CNP   diclofenac sodium 1 % GEL   Historical Provider, MD       Social History     Tobacco Use    Smoking status: Former Smoker     Packs/day: 1.00     Years: 21.00     Pack years: 21.00    Smokeless tobacco: Never Used   Substance Use Topics    Alcohol use: Yes     Alcohol/week: 0.0 standard drinks     Comment: occassionally    Drug use: No            PHYSICAL EXAMINATION:  [ INSTRUCTIONS:  \"[x]\" Indicates a positive item  \"[]\" Indicates a negative item  -- DELETE ALL ITEMS NOT EXAMINED]  [] Alert  [] Oriented to person/place/time    [] No apparent distress  [] Toxic appearing    [] Face flushed appearing [] Sclera clear  [] Lips are cyanotic      [] Breathing appears normal  [] Appears tachypneic      [] Rash on visible skin    [] Cranial Nerves II-XII grossly intact    [] Motor grossly intact in visible upper extremities    [] Motor grossly intact in visible lower extremities    [] Normal Mood  [] Anxious appearing    [] Depressed appearing  [] Confused appearing      [] Poor short term memory  [] Poor long term memory    [] OTHER:      Due to this being a TeleHealth encounter, evaluation of the following organ systems is limited: Vitals/Constitutional/EENT/Resp/CV/GI//MS/Neuro/Skin/Heme-Lymph-Imm. ASSESSMENT/PLAN:       Diagnosis Orders   1.  Hyperthyroidism  T4, Free    TSH without Reflex    CBC       Orders Placed This Encounter   Procedures    T4, Free     Standing Status:   Future     Standing Expiration Date:   9/17/2021    TSH without Reflex     Standing Status:   Future     Standing Expiration Date:   9/17/2021    CBC     Standing Status:   Future     Standing Expiration Date:   9/17/2021     Continue current dose of Tapazole 5 mg half a pill once a week plus Inderal LA 60 mg daily patient to have labs done in the next couple of weeks and follow-up in 2 months with phone visit    Total time spent with patient was 15 minutes  An electronic signature was used to authenticate this note. --Brittanie Spain MD on 9/17/2020 at 12:00 PM        Pursuant to the emergency declaration under the [address] [place] [address], [facility] waiver authority and the [employer] Act, this Virtual  Visit was conducted, with patient's consent, to reduce the patient's risk of exposure to COVID-19 and provide continuity of care for an established patient. Services were provided through a video synchronous discussion virtually to substitute for in-person clinic visit.

## 2020-10-13 ENCOUNTER — HOSPITAL ENCOUNTER (OUTPATIENT)
Dept: WOMENS IMAGING | Age: 70
Discharge: HOME | End: 2020-10-15
Payer: MEDICARE

## 2020-10-13 ENCOUNTER — VIRTUAL VISIT (OUTPATIENT)
Dept: ENDOCRINOLOGY | Age: 70
End: 2020-10-13
Payer: MEDICARE

## 2020-10-13 DIAGNOSIS — E05.90 HYPERTHYROIDISM: ICD-10-CM

## 2020-10-13 LAB
HCT VFR BLD CALC: 42.2 % (ref 37–47)
HEMOGLOBIN: 14.2 G/DL (ref 12–16)
MCH RBC QN AUTO: 30 PG (ref 27–31.3)
MCHC RBC AUTO-ENTMCNC: 33.7 % (ref 33–37)
MCV RBC AUTO: 89 FL (ref 82–100)
PDW BLD-RTO: 13.5 % (ref 11.5–14.5)
PLATELET # BLD: 276 K/UL (ref 130–400)
RBC # BLD: 4.73 M/UL (ref 4.2–5.4)
T4 FREE: 1.17 NG/DL (ref 0.84–1.68)
TSH SERPL DL<=0.05 MIU/L-ACNC: 0.04 UIU/ML (ref 0.44–3.86)
WBC # BLD: 8.8 K/UL (ref 4.8–10.8)

## 2020-10-13 PROCEDURE — 4040F PNEUMOC VAC/ADMIN/RCVD: CPT | Performed by: INTERNAL MEDICINE

## 2020-10-13 PROCEDURE — 3017F COLORECTAL CA SCREEN DOC REV: CPT | Performed by: INTERNAL MEDICINE

## 2020-10-13 PROCEDURE — 99213 OFFICE O/P EST LOW 20 MIN: CPT | Performed by: INTERNAL MEDICINE

## 2020-10-13 PROCEDURE — G8399 PT W/DXA RESULTS DOCUMENT: HCPCS | Performed by: INTERNAL MEDICINE

## 2020-10-13 PROCEDURE — 1090F PRES/ABSN URINE INCON ASSESS: CPT | Performed by: INTERNAL MEDICINE

## 2020-10-13 PROCEDURE — 1123F ACP DISCUSS/DSCN MKR DOCD: CPT | Performed by: INTERNAL MEDICINE

## 2020-10-13 PROCEDURE — 77063 BREAST TOMOSYNTHESIS BI: CPT

## 2020-10-13 PROCEDURE — G8428 CUR MEDS NOT DOCUMENT: HCPCS | Performed by: INTERNAL MEDICINE

## 2020-10-13 RX ORDER — PROPRANOLOL HCL 60 MG
CAPSULE, EXTENDED RELEASE 24HR ORAL
Qty: 90 CAPSULE | Refills: 1 | Status: SHIPPED | OUTPATIENT
Start: 2020-10-13 | End: 2021-03-18 | Stop reason: SDUPTHER

## 2020-10-13 RX ORDER — METHIMAZOLE 5 MG/1
TABLET ORAL
Qty: 15 TABLET | Refills: 3 | Status: SHIPPED | OUTPATIENT
Start: 2020-10-13 | End: 2020-12-17 | Stop reason: SDUPTHER

## 2020-10-13 NOTE — PROGRESS NOTES
10/13/2020    TELEHEALTH EVALUATION -- Audio/Visual (During [facility]-86 public health emergency)    Due to COVID 19 outbreak, patient's office visit was converted to a virtual visit. Patient was contacted and agreed to proceed with a virtual visit via [email or web address] me  The risks and benefits of converting to a virtual visit were discussed in light of the current infectious disease epidemic. Patient also understood that insurance coverage and co-pays are up to their individual insurance plans. HPI: Patient here with a video visit patient was at home I was at my office    Zaki Forrester (:  1950) has requested an audio/video evaluation for the following concern(s):    Follow-up on hyper thyroidism lab review patient on Tapazole 5 mg half once a week plus Inderal LA 60 mg daily  recent thyroid function test to review    Results for Azalea Resendiz (MRN [id number]) as of 10/17/2020 23:27   Ref. Range 10/13/2020 11:46   TSH Latest Ref Range: 0.440 - 3.860 uIU/mL 0.044 (L)   T4 Free Latest Ref Range: 0.84 - 1.68 ng/dL 1.17   WBC Latest Ref Range: 4.8 - 10.8 K/uL 8.8   RBC Latest Ref Range: 4.20 - 5.40 M/uL 4.73   Hemoglobin Quant Latest Ref Range: 12.0 - 16.0 g/dL 14.2   Hematocrit Latest Ref Range: 37.0 - 47.0 % 42.2   MCV Latest Ref Range: 82.0 - 100.0 fL 89.0   MCH Latest Ref Range: 27.0 - 31.3 pg 30.0   MCHC Latest Ref Range: 33.0 - 37.0 % 33.7   RDW Latest Ref Range: 11.5 - 14.5 % 13.5   Platelet Count Latest Ref Range: 130 - 400 K/uL 276       Results for Pansy Forte (MRN [id number]) as of 10/17/2020 23:27   Ref. Range 2019 11:30 3/12/2020 11:18 10/13/2020 11:46   TSH Latest Ref Range: 0.440 - 3.860 uIU/mL 0.049 (L) 0.030 (L) 0.044 (L)     Results for Pansy Forte (MRN [id number]) as of 10/17/2020 23:27   Ref.  Range 3/12/2020 11:18 10/13/2020 11:46   T4 Free Latest Ref Range: 0.84 - 1.68 ng/dL 1.10 1.17     Patient Active Problem List   Diagnosis    Depressive disorder    Diverticulosis of large intestine    Gastroesophageal reflux disease    Angiosarcoma (HCC)    Hypertensive disorder    Seasonal allergies    Bilateral inguinal hernia    Thyroid nodule    Goiter, toxic, multinodular    Bronchitis    At high risk for falls    Ataxia    Recurrent falls    Hyponatremia    Hypercalcemia    Hypernatremia    Muscle pain    Palpitations         Review of Systems    Prior to Visit Medications    Medication Sig Taking? Authorizing Provider   losartan-hydroCHLOROthiazide (HYZAAR) 100-12.5 MG per tablet TAKE 1 TABLET BY MOUTH  DAILY  Milton Lovelace MD   methIMAzole (TAPAZOLE) 5 MG tablet 1/2 po once a week  Sulma Funez MD   propranolol (INDERAL LA) 60 MG extended release capsule TAKE ONE CAPSULE BY MOUTH EVERY DAY  Leonel Connolly MD   folic acid (FOLVITE) 1 MG tablet Take 1 tablet by mouth daily  INO Rodas CNP   Cholecalciferol (VITAMIN D) 50 MCG (2000 UT) TABS tablet Take 1 tablet by mouth daily  INO Rodas CNP   PARoxetine (PAXIL) 20 MG tablet Take 1 tablet by mouth nightly  INO Rodas CNP   loratadine (CLARITIN) 10 MG tablet Take 1 tablet by mouth nightly  INO Rodas CNP   esomeprazole (NEXIUM) 20 MG delayed release capsule Take 1 capsule by mouth every morning (before breakfast)  INO Rodas CNP   diclofenac (VOLTAREN) 50 MG EC tablet Take 1 tablet by mouth 2 times daily  INO Rodas CNP   diclofenac sodium 1 % GEL   Historical Provider, MD       Social History     Tobacco Use    Smoking status: Former Smoker     Packs/day: 1.00     Years: 21.00     Pack years: 21.00    Smokeless tobacco: Never Used   Substance Use Topics    Alcohol use: Yes     Alcohol/week: 0.0 standard drinks     Comment: occassionally    Drug use:  No            PHYSICAL EXAMINATION:  [ INSTRUCTIONS:  \"[x]\" Indicates a positive item  \"[]\" Indicates a negative item  -- DELETE ALL ITEMS NOT EXAMINED]  [] Alert  [] Oriented to person/place/time    [] No apparent distress  [] Toxic appearing    [] Face flushed appearing [] Sclera clear  [] Lips are cyanotic      [] Breathing appears normal  [] Appears tachypneic      [] Rash on visible skin    [] Cranial Nerves II-XII grossly intact    [] Motor grossly intact in visible upper extremities    [] Motor grossly intact in visible lower extremities    [] Normal Mood  [] Anxious appearing    [] Depressed appearing  [] Confused appearing      [] Poor short term memory  [] Poor long term memory    [] OTHER:      Due to this being a TeleHealth encounter, evaluation of the following organ systems is limited: Vitals/Constitutional/EENT/Resp/CV/GI//MS/Neuro/Skin/Heme-Lymph-Imm. ASSESSMENT/PLAN:       Diagnosis Orders   1. Hyperthyroidism  T4, Free    TSH without Reflex    CBC     Orders Placed This Encounter   Procedures    T4, Free     Standing Status:   Future     Standing Expiration Date:   10/13/2021    TSH without Reflex     Standing Status:   Future     Standing Expiration Date:   10/13/2021    CBC     Standing Status:   Future     Standing Expiration Date:   10/13/2021     Orders Placed This Encounter   Medications    methIMAzole (TAPAZOLE) 5 MG tablet     Si/2 po once a week     Dispense:  15 tablet     Refill:  3    propranolol (INDERAL LA) 60 MG extended release capsule     Sig: TAKE ONE CAPSULE BY MOUTH EVERY DAY     Dispense:  90 capsule     Refill:  1     Continue current dose of Tapazole and Inderal patient to follow-up in 2  months time    Total time spent with patient was 14 minutes    An  electronic signature was used to authenticate this note.     --Devyn Leiva MD on 10/13/2020 at 4:39 PM        Pursuant to the emergency declaration under the [facility] waiver authority and the "[employer] and [employer] Act, this Virtual  Visit was conducted, with patient's consent, to reduce the patient's risk of exposure to COVID-19 and provide continuity of care for an established patient. Services were provided through a video synchronous discussion virtually to substitute for in-person clinic visit.

## 2020-11-05 RX ORDER — FOLIC ACID 1 MG/1
1 TABLET ORAL DAILY
Qty: 90 TABLET | Refills: 1 | Status: SHIPPED | OUTPATIENT
Start: 2020-11-05 | End: 2021-02-22 | Stop reason: SDUPTHER

## 2020-11-05 NOTE — TELEPHONE ENCOUNTER
Pharmacy requesting medication refill.  Please approve or deny this request.    Rx requested:  Requested Prescriptions     Pending Prescriptions Disp Refills    folic acid (FOLVITE) 1 MG tablet 90 tablet 1     Sig: Take 1 tablet by mouth daily         Last Office Visit:   9/8/2020      Next Visit Date:  Future Appointments   Date Time Provider Roxy Garcia   12/17/2020 11:00 AM Leonel Rosales MD [address] [place] [address]   1/21/2021 12:00 PM Trudi Bermudez MD [facility]

## 2020-11-19 ENCOUNTER — OFFICE VISIT (OUTPATIENT)
Dept: PRIMARY CARE CLINIC | Age: 70
End: 2020-11-19
Payer: MEDICARE

## 2020-11-19 VITALS
DIASTOLIC BLOOD PRESSURE: 70 MMHG | OXYGEN SATURATION: 99 % | WEIGHT: 154 LBS | HEART RATE: 65 BPM | RESPIRATION RATE: 16 BRPM | BODY MASS INDEX: 27.29 KG/M2 | SYSTOLIC BLOOD PRESSURE: 123 MMHG | HEIGHT: 63 IN

## 2020-11-19 DIAGNOSIS — E05.90 HYPERTHYROIDISM: ICD-10-CM

## 2020-11-19 DIAGNOSIS — J40 BRONCHITIS: ICD-10-CM

## 2020-11-19 LAB
HCT VFR BLD CALC: 43.9 % (ref 37–47)
HEMOGLOBIN: 14.8 G/DL (ref 12–16)
MCH RBC QN AUTO: 30 PG (ref 27–31.3)
MCHC RBC AUTO-ENTMCNC: 33.7 % (ref 33–37)
MCV RBC AUTO: 89.1 FL (ref 82–100)
PDW BLD-RTO: 13.2 % (ref 11.5–14.5)
PLATELET # BLD: 316 K/UL (ref 130–400)
RBC # BLD: 4.92 M/UL (ref 4.2–5.4)
T4 FREE: 1.22 NG/DL (ref 0.84–1.68)
TSH SERPL DL<=0.05 MIU/L-ACNC: 0.07 UIU/ML (ref 0.44–3.86)
WBC # BLD: 10.6 K/UL (ref 4.8–10.8)

## 2020-11-19 PROCEDURE — G8399 PT W/DXA RESULTS DOCUMENT: HCPCS | Performed by: INTERNAL MEDICINE

## 2020-11-19 PROCEDURE — 4040F PNEUMOC VAC/ADMIN/RCVD: CPT | Performed by: INTERNAL MEDICINE

## 2020-11-19 PROCEDURE — 3017F COLORECTAL CA SCREEN DOC REV: CPT | Performed by: INTERNAL MEDICINE

## 2020-11-19 PROCEDURE — 99214 OFFICE O/P EST MOD 30 MIN: CPT | Performed by: INTERNAL MEDICINE

## 2020-11-19 PROCEDURE — 1123F ACP DISCUSS/DSCN MKR DOCD: CPT | Performed by: INTERNAL MEDICINE

## 2020-11-19 PROCEDURE — G8427 DOCREV CUR MEDS BY ELIG CLIN: HCPCS | Performed by: INTERNAL MEDICINE

## 2020-11-19 PROCEDURE — 1036F TOBACCO NON-USER: CPT | Performed by: INTERNAL MEDICINE

## 2020-11-19 PROCEDURE — G8484 FLU IMMUNIZE NO ADMIN: HCPCS | Performed by: INTERNAL MEDICINE

## 2020-11-19 PROCEDURE — G8417 CALC BMI ABV UP PARAM F/U: HCPCS | Performed by: INTERNAL MEDICINE

## 2020-11-19 PROCEDURE — 1090F PRES/ABSN URINE INCON ASSESS: CPT | Performed by: INTERNAL MEDICINE

## 2020-11-19 RX ORDER — AMOXICILLIN AND CLAVULANATE POTASSIUM 875; 125 MG/1; MG/1
1 TABLET, FILM COATED ORAL 2 TIMES DAILY
Qty: 20 TABLET | Refills: 0 | Status: SHIPPED | OUTPATIENT
Start: 2020-11-19 | End: 2020-11-29

## 2020-11-19 ASSESSMENT — ENCOUNTER SYMPTOMS
BLOOD IN STOOL: 0
ABDOMINAL DISTENTION: 0
FACIAL SWELLING: 0
CHOKING: 0
PHOTOPHOBIA: 0
COUGH: 1
APNEA: 0

## 2020-11-19 ASSESSMENT — PATIENT HEALTH QUESTIONNAIRE - PHQ9
SUM OF ALL RESPONSES TO PHQ QUESTIONS 1-9: 0
SUM OF ALL RESPONSES TO PHQ QUESTIONS 1-9: 0
1. LITTLE INTEREST OR PLEASURE IN DOING THINGS: 0
2. FEELING DOWN, DEPRESSED OR HOPELESS: 0
SUM OF ALL RESPONSES TO PHQ9 QUESTIONS 1 & 2: 0
SUM OF ALL RESPONSES TO PHQ QUESTIONS 1-9: 0

## 2020-11-19 NOTE — PROGRESS NOTES
Gerard Dee 65 y.o. female presents today with   Chief Complaint   Patient presents with    Cough     x1 month, has moved to chest     Congestion     x1 month, has moved to chest        Cough   This is a recurrent problem. The current episode started more than 1 month ago. The problem has been waxing and waning. The problem occurs hourly. The cough is non-productive. Pertinent negatives include no chest pain, chills, fever or rash.        Past Medical History:   Diagnosis Date    Bronchitis with bronchospasm 2019    Bursitis of both hips     Cancer ([facility]     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    Contusion of right eyelid 2019    Cough 2016    Depression     Dizziness 2015    Dizziness 2015    Gastritis with hemorrhage     GERD (gastroesophageal reflux disease)     Heart palpitations 3/12/2020    Hereditary motor and sensory neuropathy     Hypertension     Hypertension     Need for pneumococcal vaccination 2019    Osteoarthritis     Seasonal allergies 11/3/2016    SOB (shortness of breath) 2015    SOB (shortness of breath) 2015     Patient Active Problem List    Diagnosis Date Noted    Palpitations 2020    Hyponatremia 2019    Hypercalcemia 2019    Hypernatremia 2019    Muscle pain 2019    Ataxia 2019    Recurrent falls 2019    Bronchitis 2019    At high risk for falls 2019    Goiter, toxic, multinodular 2018    Thyroid nodule 10/25/2018    Bilateral inguinal hernia 2017    Seasonal allergies 2016    Depressive disorder 2013    Diverticulosis of large intestine 2013    Gastroesophageal reflux disease 2013    Angiosarcoma ([facility] 2013    Hypertensive disorder 2013     Past Surgical History:   Procedure Laterality Date    ABDOMEN SURGERY      CATARACT REMOVAL WITH IMPLANT  2018    bilateral  and      SECTION      FINGER REPLANTATION      right thumb implant due to arthritis    HYSTERECTOMY  1993    KNEE ARTHROSCOPY      bilateral    LOBECTOMY  1974    lower back    NOSE SURGERY      sinus    TUBAL LIGATION      TUMOR REMOVAL  1973    2x on back side of neck     Family History   Adopted: Yes   Problem Relation Age of Onset    Other Mother     Other Sister     Colon Cancer Brother      Social History     Socioeconomic History    Marital status:      Spouse name: None    Number of children: None    Years of education: None    Highest education level: None   Occupational History    None   Social Needs    Financial resource strain: Not hard at all   [place] insecurity     Worry: Never true     Inability: Never true   [employer] needs     Medical: None     Non-medical: None   Tobacco Use    Smoking status: Former Smoker     Packs/day: 1.00     Years: 21.00     Pack years: 21.00     Last attempt to quit: 2017     Years since quitting: 3.8    Smokeless tobacco: Never Used   Substance and Sexual Activity    Alcohol use:  Yes     Alcohol/week: 0.0 standard drinks     Comment: occassionally    Drug use: No    Sexual activity: None   Lifestyle    Physical activity     Days per week: None     Minutes per session: None    Stress: None   Relationships    Social connections     Talks on phone: None     Gets together: None     Attends Hoah[employer]ism service: None     Active member of club or organization: None     Attends meetings of clubs or organizations: None     Relationship status: None    Intimate partner violence     Fear of current or ex partner: None     Emotionally abused: None     Physically abused: None     Forced sexual activity: None   Other Topics Concern    None   Social History Narrative    None     Allergies   Allergen Reactions    Meperidine Other (See Comments)     Upset stomach    Morphine Other (See Comments)     Upset stomach    Nasal Spray     Percocet [Oxycodone-Acetaminophen]      Upset stomach       Review of Systems   Constitutional: Negative for chills and fever. HENT: Negative for facial swelling and nosebleeds. Eyes: Negative for photophobia and visual disturbance. Respiratory: Positive for cough. Negative for apnea and choking. Cardiovascular: Negative for chest pain and palpitations. Gastrointestinal: Negative for abdominal distention and blood in stool. Genitourinary: Negative for enuresis and hematuria. Musculoskeletal: Negative for gait problem and joint swelling. Skin: Negative for rash. Neurological: Negative for syncope and speech difficulty. Hematological: Does not bruise/bleed easily. Psychiatric/Behavioral: Negative for hallucinations and suicidal ideas. Vitals:    11/19/20 1313   BP: 123/70   Site: Right Upper Arm   Position: Sitting   Cuff Size: Medium Adult   Pulse: 65   Resp: 16   SpO2: 99%   Weight: 154 lb (69.9 kg)   Height: 5' 3\" (1.6 m)       Physical Exam  Constitutional:       Appearance: She is well-developed. HENT:      Head: Normocephalic and atraumatic. Eyes:      Pupils: Pupils are equal, round, and reactive to light. Neck:      Musculoskeletal: Normal range of motion and neck supple. Cardiovascular:      Rate and Rhythm: Normal rate and regular rhythm. Heart sounds: Normal heart sounds. Pulmonary:      Effort: No respiratory distress. Breath sounds: Normal breath sounds. Abdominal:      General: There is no distension. Tenderness: There is no abdominal tenderness. Musculoskeletal: Normal range of motion. Skin:     Coloration: Skin is not jaundiced. Neurological:      Mental Status: She is alert and oriented to person, place, and time. Cranial Nerves: No cranial nerve deficit. Psychiatric:         Mood and Affect: Mood normal.       Assessment/Plan  Charo Orozco was seen today for cough and congestion. Diagnoses and all orders for this visit:    Bronchitis  -     XR CHEST (2 VW);  Future  - COVID-19 Ambulatory; Future    Other orders  -     amoxicillin-clavulanate (AUGMENTIN) 875-125 MG per tablet; Take 1 tablet by mouth 2 times daily for 10 days      No follow-ups on file.     Kalpesh Peñaloza MD

## 2020-11-22 LAB
SARS-COV-2: NOT DETECTED
SOURCE: NORMAL

## 2020-12-17 ENCOUNTER — VIRTUAL VISIT (OUTPATIENT)
Dept: ENDOCRINOLOGY | Age: 70
End: 2020-12-17
Payer: MEDICARE

## 2020-12-17 PROCEDURE — 99442 PR PHYS/QHP TELEPHONE EVALUATION 11-20 MIN: CPT | Performed by: INTERNAL MEDICINE

## 2020-12-17 RX ORDER — METHIMAZOLE 5 MG/1
TABLET ORAL
Qty: 15 TABLET | Refills: 3 | Status: SHIPPED | OUTPATIENT
Start: 2020-12-17 | End: 2021-03-18 | Stop reason: SDUPTHER

## 2020-12-17 NOTE — PROGRESS NOTES
2020    TELEHEALTH EVALUATION -- Audio/Visual (During [facility]YMM-28 public health emergency)    Due to COVID 19 outbreak, patient's office visit was converted to a virtual visit. Patient was contacted and agreed to proceed with a virtual visit via Telephone Visit  The risks and benefits of converting to a virtual visit were discussed in light of the current infectious disease epidemic. Patient also understood that insurance coverage and co-pays are up to their individual insurance plans. HPI:  Hyper thyroidism patient on very low-dose of Tapazole symptoms are overall stable reviewed labs done recently  Hypothyroidism due to her thyroid nodule patient on Tapazole 2.5 mg once a week plus Inderal LA 60 mg daily  Last TSH still slightly suppressed free T4 on the low end of normal      Leetiffani Tay (:  1950) has requested an audio/video evaluation for the following concern(s):        Results for Rachelle Aragon (MRN [id number]) as of 2020 11:22   Ref. Range 10/13/2020 11:46 10/30/2020 05:13 2020 13:16   TSH Latest Ref Range: 0.440 - 3.860 uIU/mL 0.044 (L)  0.074 (L)   T4 Free Latest Ref Range: 0.84 - 1.68 ng/dL 1.17  1.22   WBC Latest Ref Range: 4.8 - 10.8 K/uL 8.8  10.6   RBC Latest Ref Range: 4.20 - 5.40 M/uL 4.73  4.92   Hemoglobin Quant Latest Ref Range: 12.0 - 16.0 g/dL 14.2  14.8   Hematocrit Latest Ref Range: 37.0 - 47.0 % 42.2  43.9   MCV Latest Ref Range: 82.0 - 100.0 fL 89.0  89.1   MCH Latest Ref Range: 27.0 - 31.3 pg 30.0  30.0   MCHC Latest Ref Range: 33.0 - 37.0 % 33.7  33.7   RDW Latest Ref Range: 11.5 - 14.5 % 13.5  13.2   Platelet Count Latest Ref Range: 130 - 400 K/uL 276  316       Review of Systems    Prior to Visit Medications    Medication Sig Taking?  Authorizing Provider   folic acid (FOLVITE) 1 MG tablet Take 1 tablet by mouth daily  Maggy Larsen MD   methIMAzole (TAPAZOLE) 5 MG tablet 1/2 po once a week  Jorge Hoang MD propranolol (INDERAL LA) 60 MG extended release capsule TAKE ONE CAPSULE BY MOUTH Ramy Irby MD   losartan-hydroCHLOROthiazide (HYZAAR) 100-12.5 MG per tablet TAKE 1 TABLET BY MOUTH  DAILY  Gerhard Romero MD   Cholecalciferol (VITAMIN D) 50 MCG (2000 UT) TABS tablet Take 1 tablet by mouth daily  INO Rodas CNP   PARoxetine (PAXIL) 20 MG tablet Take 1 tablet by mouth nightly  INO Rodas CNP   loratadine (CLARITIN) 10 MG tablet Take 1 tablet by mouth nightly  INO Rodas CNP   esomeprazole (NEXIUM) 20 MG delayed release capsule Take 1 capsule by mouth every morning (before breakfast)  INO Rodas CNP   diclofenac (VOLTAREN) 50 MG EC tablet Take 1 tablet by mouth 2 times daily  INO Rodas CNP   diclofenac sodium 1 % GEL   Historical Provider, MD       Social History     Tobacco Use    Smoking status: Former Smoker     Packs/day: 1.00     Years: 21.00     Pack years: 21.00     Quit date: 2017     Years since quitting: 3.9    Smokeless tobacco: Never Used   Substance Use Topics    Alcohol use: Yes     Alcohol/week: 0.0 standard drinks     Comment: occassionally    Drug use:  No            PHYSICAL EXAMINATION:  [ INSTRUCTIONS:  \"[x]\" Indicates a positive item  \"[]\" Indicates a negative item  -- DELETE ALL ITEMS NOT EXAMINED]  [] Alert  [] Oriented to person/place/time    [] No apparent distress  [] Toxic appearing    [] Face flushed appearing [] Sclera clear  [] Lips are cyanotic      [] Breathing appears normal  [] Appears tachypneic      [] Rash on visible skin    [] Cranial Nerves II-XII grossly intact    [] Motor grossly intact in visible upper extremities    [] Motor grossly intact in visible lower extremities    [] Normal Mood  [] Anxious appearing    [] Depressed appearing  [] Confused appearing      [] Poor short term memory  [] Poor long term memory    [] OTHER: Due to this being a TeleHealth encounter, evaluation of the following organ systems is limited: Vitals/Constitutional/EENT/Resp/CV/GI//MS/Neuro/Skin/Heme-Lymph-Imm. ASSESSMENT/PLAN:   Diagnosis Orders   1. Hyperthyroidism     2. Hot thyroid nodule  T4, Free    TSH without Reflex    CBC     Orders Placed This Encounter   Procedures    T4, Free     Standing Status:   Future     Standing Expiration Date:   2021    TSH without Reflex     Standing Status:   Future     Standing Expiration Date:   2021    CBC     Standing Status:   Future     Standing Expiration Date:   2021     Orders Placed This Encounter   Medications    methIMAzole (TAPAZOLE) 5 MG tablet     Si/2 po once a week     Dispense:  15 tablet     Refill:  3         An  electronic signature was used to authenticate this note. --Ember Jarrett MD on 2020 at 11:22 AM        Pursuant to the emergency declaration under the [address] [place] [address], [address] [place] [address] and the [employer] and [employer] Act, this Virtual  Visit was conducted, with patient's consent, to reduce the patient's risk of exposure to COVID-19 and provide continuity of care for an established patient. Services were provided through a video synchronous discussion virtually to substitute for in-person clinic visit.

## 2021-02-22 ENCOUNTER — VIRTUAL VISIT (OUTPATIENT)
Dept: PRIMARY CARE CLINIC | Age: 71
End: 2021-02-22
Payer: MEDICARE

## 2021-02-22 DIAGNOSIS — F32.A DEPRESSIVE DISORDER: ICD-10-CM

## 2021-02-22 DIAGNOSIS — E53.8 FOLIC ACID DEFICIENCY: ICD-10-CM

## 2021-02-22 PROCEDURE — 99442 PR PHYS/QHP TELEPHONE EVALUATION 11-20 MIN: CPT | Performed by: INTERNAL MEDICINE

## 2021-02-22 RX ORDER — PAROXETINE HYDROCHLORIDE 20 MG/1
20 TABLET, FILM COATED ORAL NIGHTLY
Qty: 90 TABLET | Refills: 3 | Status: SHIPPED | OUTPATIENT
Start: 2021-02-22

## 2021-02-22 RX ORDER — FOLIC ACID 1 MG/1
1 TABLET ORAL DAILY
Qty: 90 TABLET | Refills: 3 | Status: SHIPPED | OUTPATIENT
Start: 2021-02-22 | End: 2022-02-07

## 2021-02-22 ASSESSMENT — PATIENT HEALTH QUESTIONNAIRE - PHQ9
SUM OF ALL RESPONSES TO PHQ QUESTIONS 1-9: 0
SUM OF ALL RESPONSES TO PHQ QUESTIONS 1-9: 0
2. FEELING DOWN, DEPRESSED OR HOPELESS: 0

## 2021-02-22 NOTE — PROGRESS NOTES
Phone  Tung Koo is a 65 y.o. female evaluated via telephone on 2/22/2021. Tung Koo 65 y.o. female presents today with   Chief Complaint   Patient presents with    1 Month Follow-Up    Medication Refill       Mental Health Problem  The primary symptoms include dysphoric mood and somatic symptoms. The primary symptoms do not include hallucinations. The current episode started more than 1 month ago. This is a recurrent problem. Somatic symptoms do not include headaches. The onset of the illness is precipitated by emotional stress and a stressful event. The degree of incapacity that she is experiencing as a consequence of her illness is moderate. Additional symptoms of the illness include anhedonia, insomnia and agitation. Additional symptoms of the illness do not include headaches. She does not admit to suicidal ideas. She does not contemplate harming herself. Risk factors that are present for mental illness include a history of mental illness.        Past Medical History:   Diagnosis Date    Bronchitis with bronchospasm 5/24/2019    Bursitis of both hips     Cancer ([facility] [address]     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    Contusion of right eyelid 6/5/2019    Cough 11/17/2016    Depression     Dizziness 6/18/2015    Dizziness 6/18/2015    Gastritis with hemorrhage     GERD (gastroesophageal reflux disease)     Heart palpitations 3/12/2020    Hereditary motor and sensory neuropathy 2019    Hypertension     Hypertension     Need for pneumococcal vaccination 6/5/2019    Osteoarthritis     Seasonal allergies 11/3/2016    SOB (shortness of breath) 6/18/2015    SOB (shortness of breath) 6/18/2015     Patient Active Problem List    Diagnosis Date Noted    Palpitations 03/12/2020    Hyponatremia 09/07/2019    Hypercalcemia 09/07/2019    Hypernatremia 09/07/2019    Muscle pain 09/07/2019    Ataxia 06/05/2019    Recurrent falls 06/05/2019    Bronchitis 05/24/2019  At high risk for falls 2019    Goiter, toxic, multinodular 2018    Thyroid nodule 10/25/2018    Bilateral inguinal hernia 2017    Seasonal allergies 2016    Depressive disorder 2013    Diverticulosis of large intestine 2013    Gastroesophageal reflux disease 2013    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    Hypertensive disorder 2013     Past Surgical History:   Procedure Laterality Date    ABDOMEN SURGERY      CATARACT REMOVAL WITH IMPLANT  2018    bilateral  and      SECTION      FINGER REPLANTATION      right thumb implant due to arthritis    HYSTERECTOMY  1993    KNEE ARTHROSCOPY      bilateral    LOBECTOMY  1974    lower back    NOSE SURGERY      sinus    TUBAL LIGATION      TUMOR REMOVAL  1973    2x on back side of neck     Family History   Adopted: Yes   Problem Relation Age of Onset    Other Mother     Other Sister     Colon Cancer Brother      Social History     Socioeconomic History    Marital status:      Spouse name: Not on file    Number of children: Not on file    Years of education: Not on file    Highest education level: Not on file   Occupational History    Not on file   Social Needs    Financial resource strain: Not hard at all   Boxee insecurity     Worry: Never true     Inability: Never true   [employer] needs     Medical: Not on file     Non-medical: Not on file   Tobacco Use    Smoking status: Former Smoker     Packs/day: 1.00     Years: 21.00     Pack years: 21.00     Quit date:      Years since quittin.1    Smokeless tobacco: Never Used   Substance and Sexual Activity    Alcohol use:  Yes     Alcohol/week: 0.0 standard drinks     Comment: occassionally    Drug use: No    Sexual activity: Not on file   Lifestyle    Physical activity     Days per week: Not on file     Minutes per session: Not on file    Stress: Not on file   Relationships    Social connections Return in about 6 months (around 8/22/2021), or if symptoms worsen or fail to improve. Michelle Shore MD    Consent:  She and/or health care decision maker is aware that that she may receive a bill for this telephone service, depending on her insurance coverage, and has provided verbal consent to proceed: Yes      Documentation:  I communicated with the patient and/or health care decision maker about depression. Details of this discussion including any medical advice provided: meds      I affirm this is a Patient Initiated Episode with a Patient who has not had a related appointment within my department in the past 7 days or scheduled within the next 24 hours.     Patient identification was verified at the start of the visit: Yes    Total Time: minutes: 11-20 minutes    Note: not billable if this call serves to triage the patient into an appointment for the relevant concern      Michelle Shore

## 2021-02-28 ASSESSMENT — ENCOUNTER SYMPTOMS
ABDOMINAL DISTENTION: 0
APNEA: 0
COUGH: 0
BLOOD IN STOOL: 0

## 2021-03-04 DIAGNOSIS — E05.90 HYPERTHYROIDISM: ICD-10-CM

## 2021-03-04 LAB
HCT VFR BLD CALC: 41.9 % (ref 37–47)
HEMOGLOBIN: 14.2 G/DL (ref 12–16)
MCH RBC QN AUTO: 29.5 PG (ref 27–31.3)
MCHC RBC AUTO-ENTMCNC: 33.8 % (ref 33–37)
MCV RBC AUTO: 87.3 FL (ref 82–100)
PDW BLD-RTO: 13.5 % (ref 11.5–14.5)
PLATELET # BLD: 275 K/UL (ref 130–400)
RBC # BLD: 4.8 M/UL (ref 4.2–5.4)
T4 FREE: 1.12 NG/DL (ref 0.84–1.68)
TSH SERPL DL<=0.05 MIU/L-ACNC: 0.04 UIU/ML (ref 0.44–3.86)
WBC # BLD: 7.8 K/UL (ref 4.8–10.8)

## 2021-03-18 ENCOUNTER — VIRTUAL VISIT (OUTPATIENT)
Dept: ENDOCRINOLOGY | Age: 71
End: 2021-03-18
Payer: MEDICARE

## 2021-03-18 DIAGNOSIS — E04.1 HOT THYROID NODULE: ICD-10-CM

## 2021-03-18 DIAGNOSIS — E05.90 HYPERTHYROIDISM: Primary | ICD-10-CM

## 2021-03-18 PROCEDURE — 3017F COLORECTAL CA SCREEN DOC REV: CPT | Performed by: INTERNAL MEDICINE

## 2021-03-18 PROCEDURE — 99213 OFFICE O/P EST LOW 20 MIN: CPT | Performed by: INTERNAL MEDICINE

## 2021-03-18 PROCEDURE — 1123F ACP DISCUSS/DSCN MKR DOCD: CPT | Performed by: INTERNAL MEDICINE

## 2021-03-18 PROCEDURE — G8399 PT W/DXA RESULTS DOCUMENT: HCPCS | Performed by: INTERNAL MEDICINE

## 2021-03-18 PROCEDURE — 4040F PNEUMOC VAC/ADMIN/RCVD: CPT | Performed by: INTERNAL MEDICINE

## 2021-03-18 PROCEDURE — 1090F PRES/ABSN URINE INCON ASSESS: CPT | Performed by: INTERNAL MEDICINE

## 2021-03-18 PROCEDURE — G8428 CUR MEDS NOT DOCUMENT: HCPCS | Performed by: INTERNAL MEDICINE

## 2021-03-18 RX ORDER — PROPRANOLOL HCL 60 MG
CAPSULE, EXTENDED RELEASE 24HR ORAL
Qty: 90 CAPSULE | Refills: 1 | Status: SHIPPED | OUTPATIENT
Start: 2021-03-18 | End: 2021-05-27 | Stop reason: SDUPTHER

## 2021-03-18 RX ORDER — METHIMAZOLE 5 MG/1
TABLET ORAL
Qty: 15 TABLET | Refills: 3 | Status: SHIPPED | OUTPATIENT
Start: 2021-03-18 | End: 2021-06-22 | Stop reason: SDUPTHER

## 2021-03-18 NOTE — PROGRESS NOTES
3/18/2021    TELEHEALTH EVALUATION -- Audio/Visual (During SHZJQ-94 public health emergency)    Due to Matthmarcy 19 outbreak, patient's office visit was converted to a virtual visit. Patient was contacted and agreed to proceed with a virtual visit via Doxy. me  The risks and benefits of converting to a virtual visit were discussed in light of the current infectious disease epidemic. Patient also understood that insurance coverage and co-pays are up to their individual insurance plans. HPI: Video visit patient was at home I was at my office    Jeanne Carranza (:  1950) has requested an audio/video evaluation for the following concern(s):    Hyper thyroidism secondary to water time patient on low-dose Tapazole and around overall has noticed improvement in her symptoms of palpitations has gained some weight labs were reviewed done recently 2 weeks ago slightly improved free T4 TSH is still suppressed but has been improving requesting refills    Results for Trey Art (MRN [id number]) as of 3/18/2021 10:44   Ref.  Range 2020 13:16 2020 14:06 2020 14:46 3/4/2021 12:07   TSH Latest Ref Range: 0.440 - 3.860 uIU/mL 0.074 (L)   0.036 (L)   T4 Free Latest Ref Range: 0.84 - 1.68 ng/dL 1.22   1.12   WBC Latest Ref Range: 4.8 - 10.8 K/uL 10.6   7.8   RBC Latest Ref Range: 4.20 - 5.40 M/uL 4.92   4.80   Hemoglobin Quant Latest Ref Range: 12.0 - 16.0 g/dL 14.8   14.2   Hematocrit Latest Ref Range: 37.0 - 47.0 % 43.9   41.9   MCV Latest Ref Range: 82.0 - 100.0 fL 89.1   87.3   MCH Latest Ref Range: 27.0 - 31.3 pg 30.0   29.5   MCHC Latest Ref Range: 33.0 - 37.0 % 33.7   33.8   RDW Latest Ref Range: 11.5 - 14.5 % 13.2   13.5   Platelet Count Latest Ref Range: 130 - 400 K/uL 316   275     Patient Active Problem List   Diagnosis    Depressive disorder    Diverticulosis of large intestine    Gastroesophageal reflux disease    Angiosarcoma (HCC)    Hypertensive disorder    Seasonal allergies    Bilateral inguinal hernia    Thyroid nodule    Goiter, toxic, multinodular    Bronchitis    At high risk for falls    Ataxia    Recurrent falls    Hyponatremia    Hypercalcemia    Hypernatremia    Muscle pain    Palpitations         Review of Systems   Cardiovascular: Positive for palpitations. Endocrine: Negative. All other systems reviewed and are negative. Prior to Visit Medications    Medication Sig Taking? Authorizing Provider   folic acid (FOLVITE) 1 MG tablet Take 1 tablet by mouth daily  Jani Weaver MD   PARoxetine (PAXIL) 20 MG tablet Take 1 tablet by mouth nightly  Jani Weaver MD   methIMAzole (TAPAZOLE) 5 MG tablet 1/2 po once a week  Lizett Solo MD   propranolol (INDERAL LA) 60 MG extended release capsule TAKE ONE CAPSULE BY MOUTH Jonel Sparks MD   losartan-hydroCHLOROthiazide (HYZAAR) 100-12.5 MG per tablet TAKE 1 TABLET BY MOUTH  DAILY  Tabby Galicia MD   Cholecalciferol (VITAMIN D) 50 MCG ( UT) TABS tablet Take 1 tablet by mouth daily  INO Rodas CNP   loratadine (CLARITIN) 10 MG tablet Take 1 tablet by mouth nightly  INO Rodas CNP   esomeprazole (NEXIUM) 20 MG delayed release capsule Take 1 capsule by mouth every morning (before breakfast)  INO Rodas CNP   diclofenac (VOLTAREN) 50 MG EC tablet Take 1 tablet by mouth 2 times daily  ION Rodas CNP   diclofenac sodium 1 % GEL   Historical Provider, MD       Social History     Tobacco Use    Smoking status: Former Smoker     Packs/day: 1.00     Years: 21.00     Pack years: 21.00     Quit date:      Years since quittin.2    Smokeless tobacco: Never Used   Substance Use Topics    Alcohol use: Yes     Alcohol/week: 0.0 standard drinks     Comment: occassionally    Drug use:  No            PHYSICAL EXAMINATION:  [ INSTRUCTIONS:  \"[x]\" Indicates a positive item  \"[]\" Indicates a negative item  -- DELETE ALL ITEMS NOT EXAMINED]  [] Alert  [] Oriented to person/place/time    [] No apparent distress  [] Toxic appearing    [] Face flushed appearing [] Sclera clear  [] Lips are cyanotic      [] Breathing appears normal  [] Appears tachypneic      [] Rash on visible skin    [] Cranial Nerves II-XII grossly intact    [] Motor grossly intact in visible upper extremities    [] Motor grossly intact in visible lower extremities    [] Normal Mood  [] Anxious appearing    [] Depressed appearing  [] Confused appearing      [] Poor short term memory  [] Poor long term memory    [] OTHER:      Due to this being a TeleHealth encounter, evaluation of the following organ systems is limited: Vitals/Constitutional/EENT/Resp/CV/GI//MS/Neuro/Skin/Heme-Lymph-Imm. ASSESSMENT/PLAN:     Diagnosis Orders   1. Hyperthyroidism     2. Hot thyroid nodule  T4, Free    TSH without Reflex    CBC     Orders Placed This Encounter   Procedures    T4, Free     Standing Status:   Future     Standing Expiration Date:   3/18/2022    TSH without Reflex     Standing Status:   Future     Standing Expiration Date:   3/18/2022    CBC     Standing Status:   Future     Standing Expiration Date:   3/18/2022     Orders Placed This Encounter   Medications    methIMAzole (TAPAZOLE) 5 MG tablet     Si/2 po once a week     Dispense:  15 tablet     Refill:  3    propranolol (INDERAL LA) 60 MG extended release capsule     Sig: TAKE ONE CAPSULE BY MOUTH EVERY DAY     Dispense:  90 capsule     Refill:  1     Continue Tapazole 5 mg half pill once a week and Inderal LA 60 mg daily repeat thyroid function test CBC in 2 to 3 months time monitor thyroid function test closely      An  electronic signature was used to authenticate this note.     --Meg Barron MD on 3/18/2021 at 10:44 AM        Pursuant to the emergency declaration under the [facility], [address] [place] [address] and the [employer] Act, this Virtual  Visit was conducted, with patient's consent, to reduce the patient's risk of exposure to COVID-19 and provide continuity of care for an established patient. Services were provided through a video synchronous discussion virtually to substitute for in-person clinic visit.

## 2021-05-27 ENCOUNTER — OFFICE VISIT (OUTPATIENT)
Dept: CARDIOLOGY CLINIC | Age: 71
End: 2021-05-27
Payer: MEDICARE

## 2021-05-27 VITALS
WEIGHT: 144 LBS | DIASTOLIC BLOOD PRESSURE: 76 MMHG | BODY MASS INDEX: 25.52 KG/M2 | HEIGHT: 63 IN | SYSTOLIC BLOOD PRESSURE: 120 MMHG

## 2021-05-27 DIAGNOSIS — R00.2 PALPITATION: Primary | ICD-10-CM

## 2021-05-27 PROCEDURE — 1123F ACP DISCUSS/DSCN MKR DOCD: CPT | Performed by: INTERNAL MEDICINE

## 2021-05-27 PROCEDURE — 1036F TOBACCO NON-USER: CPT | Performed by: INTERNAL MEDICINE

## 2021-05-27 PROCEDURE — G8417 CALC BMI ABV UP PARAM F/U: HCPCS | Performed by: INTERNAL MEDICINE

## 2021-05-27 PROCEDURE — 1090F PRES/ABSN URINE INCON ASSESS: CPT | Performed by: INTERNAL MEDICINE

## 2021-05-27 PROCEDURE — 4040F PNEUMOC VAC/ADMIN/RCVD: CPT | Performed by: INTERNAL MEDICINE

## 2021-05-27 PROCEDURE — 93000 ELECTROCARDIOGRAM COMPLETE: CPT | Performed by: INTERNAL MEDICINE

## 2021-05-27 PROCEDURE — 3017F COLORECTAL CA SCREEN DOC REV: CPT | Performed by: INTERNAL MEDICINE

## 2021-05-27 PROCEDURE — G8399 PT W/DXA RESULTS DOCUMENT: HCPCS | Performed by: INTERNAL MEDICINE

## 2021-05-27 PROCEDURE — G8427 DOCREV CUR MEDS BY ELIG CLIN: HCPCS | Performed by: INTERNAL MEDICINE

## 2021-05-27 PROCEDURE — 99214 OFFICE O/P EST MOD 30 MIN: CPT | Performed by: INTERNAL MEDICINE

## 2021-05-27 RX ORDER — MONTELUKAST SODIUM 10 MG/1
10 TABLET ORAL NIGHTLY
Qty: 90 TABLET | Refills: 1 | Status: SHIPPED | OUTPATIENT
Start: 2021-05-27 | End: 2021-09-29

## 2021-05-27 RX ORDER — LOSARTAN POTASSIUM AND HYDROCHLOROTHIAZIDE 12.5; 1 MG/1; MG/1
TABLET ORAL
Qty: 90 TABLET | Refills: 3 | Status: SHIPPED | OUTPATIENT
Start: 2021-05-27 | End: 2022-09-13 | Stop reason: SDUPTHER

## 2021-05-27 RX ORDER — PROPRANOLOL HCL 60 MG
CAPSULE, EXTENDED RELEASE 24HR ORAL
Qty: 90 CAPSULE | Refills: 1 | Status: SHIPPED | OUTPATIENT
Start: 2021-05-27 | End: 2021-12-20

## 2021-05-27 ASSESSMENT — ENCOUNTER SYMPTOMS
CHEST TIGHTNESS: 0
NAUSEA: 0
WHEEZING: 0
BLOOD IN STOOL: 0
COUGH: 0
STRIDOR: 0
SHORTNESS OF BREATH: 0
VOMITING: 0
DIARRHEA: 0

## 2021-05-27 NOTE — PROGRESS NOTES
[facility] CARDIOLOGY OFFICE FOLLOW-UP      Patient: Estuardo Roper  YOB: 1950  MRN: [id number]    Chief Complaint:  Chief Complaint   Patient presents with    6 Month Follow-Up    Hypertension    Palpitations         Subjective/HPI:  5/27/21: Patient presents today for evaluation and follow-up of hypertension. Occasionally has some cough \" especially in during the allergy season. Does not appear to be in congestive heart failure. Last stress test ejection fraction was normal.  She has hypertension hypertensive heart disease. No chest pain. Occasional palpitations will give a prescription for Singulair. I do not think is CHF. See me in a month      7/16/2020: Patient presents today for follow-up of hypertension. And palpitation. She has postnasal discharge. No fever. Occasional and rare she will get some palpitation. No CHF. Non-smoker. 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 Give a prescription for this Augmentin 875 twice daily for 2 weeks and follow with Dr. Gilmer Lafleur. Stress test is negative        3/12/2020: Patient presents today for follow-up of hypertension.  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 Gilmer Lafleur. Mika Reyna doing well. Santana Red was on [place] in [place]  1 episode of palpitation that lasted almost a whole day.  No excessive alcohol use.  I have told her if that happens she can always take an extra metoprolol for that day. Santana Red will see me in 1 year     3/7/19: Patient presents today for follow-up of hypertension. Remains extremely well-controlled. On a combination of losartan hydrochlorothiazide and Inderal. No chest pain congestive heart failure symptoms. I had sent her to Dr. Gilmer Lafleur for primary. Retired nurse from [address] [place] [address]. See me in one year        3/8/2018: Patient presents today for Evaluation of hypertension. Doing well no chest pain no shortness of breath is still gets occasional palpitations. Has a history of GERD which is stable.  See me in one year.        16: For fu of HTN. Saw me 2 weeks ago. Was having allergy?/bronchitis. Treated with claritin,singulai and ATB. Still some residual symptoms. Get CXR today. See me in [facility]     11/3/16: For fu of HTN. Long standing. Retired nurse at [employer]. On losartan for ever. Has allergy. Gets around Oct.Couch,nasal congestion. I dont think losartan allergy. Give claritin,singulair and Z andre. See in 2 weeks.     12/24/15: Doing well. BP controlled. See in 1 year. Will see Harinder Marcus for primary care.     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 Hypertensive. On ARB/Bystolic for a longtime. Will set up with Harinder Marcus for primary care. Renew meds for 1 year. See in 6m.           Past Medical History:   Diagnosis Date    Bronchitis with bronchospasm 2019    Bursitis of both hips     Cancer ([facility] [address]     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    Contusion of right eyelid 2019    Cough 2016    Depression     Dizziness 2015    Dizziness 2015    Gastritis with hemorrhage     GERD (gastroesophageal reflux disease)     Heart palpitations 3/12/2020    Hereditary motor and sensory neuropathy     Hypertension     Hypertension     Need for pneumococcal vaccination 2019    Osteoarthritis     Seasonal allergies 11/3/2016    SOB (shortness of breath) 2015    SOB (shortness of breath) 2015       Past Surgical History:   Procedure Laterality Date    ABDOMEN SURGERY      CATARACT REMOVAL WITH IMPLANT  2018    bilateral  and      SECTION      FINGER REPLANTATION      right thumb implant due to arthritis    HYSTERECTOMY      KNEE ARTHROSCOPY      bilateral    LOBECTOMY  1974    lower back    NOSE SURGERY      sinus    TUBAL LIGATION      TUMOR REMOVAL  1973    2x on back side of neck       Family History   Adopted: Yes   Problem Relation Age of Onset    Other Mother     Other Sister     Colon Cancer Brother        Social History     Socioeconomic History    Marital status:      Spouse name: Not on file    Number of children: Not on file    Years of education: Not on file    Highest education level: Not on file   Occupational History    Not on file   Tobacco Use    Smoking status: Former Smoker     Packs/day: 1.00     Years: 21.00     Pack years: 21.00     Quit date:      Years since quittin.4    Smokeless tobacco: Never Used   Substance and Sexual Activity    Alcohol use: Yes     Alcohol/week: 0.0 standard drinks     Comment: occassionally    Drug use: No    Sexual activity: Not on file   Other Topics Concern    Not on file   Social History Narrative    Not on file     Social Determinants of Health     Financial Resource Strain: Low Risk     Difficulty of Paying Living Expenses: Not hard at all   Food Insecurity: No Food Insecurity    Worried About [address] in the Last Year: Never true    [address] in the Last Year: Never true   Transportation Needs:     Lack of Transportation (Medical):  Lack of Transportation (Non-Medical):    Physical Activity:     Days of Exercise per Week:     Minutes of Exercise per Session:    Stress:     Feeling of Stress :    Social Connections:     Frequency of Communication with Friends and Family:     Frequency of Social Gatherings with Friends and Family:     Attends Jewish Services:     Active Member of Clubs or Organizations:     Attends Club or Organization Meetings:     Marital Status:    Intimate Partner Violence:     Fear of Current or Ex-Partner:     Emotionally Abused:     Physically Abused:     Sexually Abused:         Allergies   Allergen Reactions    Meperidine Other (See Comments)     Upset stomach    Morphine Other (See Comments)     Upset stomach    Nasal Spray     Percocet [Oxycodone-Acetaminophen]      Upset stomach       Current Outpatient Medications   Medication Sig Dispense Refill    losartan-hydroCHLOROthiazide (HYZAAR) 100-12.5 MG per tablet TAKE 1 TABLET BY MOUTH DAILY 90 tablet 3    propranolol (INDERAL LA) 60 MG extended release capsule TAKE ONE CAPSULE BY MOUTH EVERY DAY 90 capsule 1    montelukast (SINGULAIR) 10 MG tablet Take 1 tablet by mouth nightly 90 tablet 1    methIMAzole (TAPAZOLE) 5 MG tablet 1/2 po once a week 15 tablet 3    folic acid (FOLVITE) 1 MG tablet Take 1 tablet by mouth daily 90 tablet 3    PARoxetine (PAXIL) 20 MG tablet Take 1 tablet by mouth nightly 90 tablet 3    Cholecalciferol (VITAMIN D) 50 MCG (2000 UT) TABS tablet Take 1 tablet by mouth daily 90 tablet 1    loratadine (CLARITIN) 10 MG tablet Take 1 tablet by mouth nightly 90 tablet 1    esomeprazole (NEXIUM) 20 MG delayed release capsule Take 1 capsule by mouth every morning (before breakfast) 90 capsule 1    diclofenac sodium 1 % GEL       diclofenac (VOLTAREN) 50 MG EC tablet Take 1 tablet by mouth 2 times daily 60 tablet 0     No current facility-administered medications for this visit. Review of Systems:   Review of Systems   Constitutional: Negative for diaphoresis. HENT: Negative for nosebleeds. Respiratory: Negative for cough, chest tightness, shortness of breath, wheezing and stridor. Cardiovascular: Positive for palpitations. Negative for chest pain and leg swelling. Gastrointestinal: Negative for blood in stool, diarrhea, nausea and vomiting. Musculoskeletal: Negative for myalgias. Neurological: Negative for dizziness, seizures, syncope, weakness, light-headedness, numbness and headaches. Hematological: Does not bruise/bleed easily. Psychiatric/Behavioral: Negative for suicidal ideas. The patient is not nervous/anxious. All other systems reviewed and are negative. Review of System is negative except for as mentioned above.       Physical Examination:    /76 (Site: Right Upper Arm, Position: Sitting, Cuff Size: Medium Adult)   Ht 5' 3\" (1.6 m)   Wt 144 lb (65.3 kg)   BMI 25.51 kg/m²    Physical Exam   Constitutional: She appears mouth nightly     Dispense:  90 tablet     Refill:  1             Assessment/Orders:       ICD-10-CM    1. Palpitation  R00.2 EKG 12 lead       Orders Placed This Encounter   Medications    losartan-hydroCHLOROthiazide (HYZAAR) 100-12.5 MG per tablet     Sig: TAKE 1 TABLET BY MOUTH  DAILY     Dispense:  90 tablet     Refill:  3     Requesting 1 year supply    propranolol (INDERAL LA) 60 MG extended release capsule     Sig: TAKE ONE CAPSULE BY MOUTH EVERY DAY     Dispense:  90 capsule     Refill:  1    montelukast (SINGULAIR) 10 MG tablet     Sig: Take 1 tablet by mouth nightly     Dispense:  90 tablet     Refill:  1       Medications Discontinued During This Encounter   Medication Reason    losartan-hydroCHLOROthiazide (HYZAAR) 100-12.5 MG per tablet REORDER    propranolol (INDERAL LA) 60 MG extended release capsule REORDER       Orders Placed This Encounter   Procedures    EKG 12 lead     Order Specific Question:   Reason for Exam?     Answer:   Irregular heart rate         Plan:  Prescribed Singuilar    Stay on same medications.     See me in 1 month        Electronically signed by: Fior Rodriguez MD  5/27/2021 1:39 PM

## 2021-06-14 DIAGNOSIS — E04.1 HOT THYROID NODULE: ICD-10-CM

## 2021-06-14 LAB
HCT VFR BLD CALC: 40.2 % (ref 37–47)
HEMOGLOBIN: 14.1 G/DL (ref 12–16)
MCH RBC QN AUTO: 29.9 PG (ref 27–31.3)
MCHC RBC AUTO-ENTMCNC: 35.1 % (ref 33–37)
MCV RBC AUTO: 85.1 FL (ref 82–100)
PDW BLD-RTO: 12.8 % (ref 11.5–14.5)
PLATELET # BLD: 286 K/UL (ref 130–400)
RBC # BLD: 4.72 M/UL (ref 4.2–5.4)
T4 FREE: 1.21 NG/DL (ref 0.84–1.68)
TSH SERPL DL<=0.05 MIU/L-ACNC: 0.01 UIU/ML (ref 0.44–3.86)
WBC # BLD: 8 K/UL (ref 4.8–10.8)

## 2021-06-22 ENCOUNTER — VIRTUAL VISIT (OUTPATIENT)
Dept: ENDOCRINOLOGY | Age: 71
End: 2021-06-22
Payer: MEDICARE

## 2021-06-22 DIAGNOSIS — E04.1 HOT THYROID NODULE: ICD-10-CM

## 2021-06-22 DIAGNOSIS — E05.90 HYPERTHYROIDISM: Primary | ICD-10-CM

## 2021-06-22 PROCEDURE — G8399 PT W/DXA RESULTS DOCUMENT: HCPCS | Performed by: INTERNAL MEDICINE

## 2021-06-22 PROCEDURE — 1123F ACP DISCUSS/DSCN MKR DOCD: CPT | Performed by: INTERNAL MEDICINE

## 2021-06-22 PROCEDURE — 99213 OFFICE O/P EST LOW 20 MIN: CPT | Performed by: INTERNAL MEDICINE

## 2021-06-22 PROCEDURE — 1090F PRES/ABSN URINE INCON ASSESS: CPT | Performed by: INTERNAL MEDICINE

## 2021-06-22 PROCEDURE — G8428 CUR MEDS NOT DOCUMENT: HCPCS | Performed by: INTERNAL MEDICINE

## 2021-06-22 PROCEDURE — 3017F COLORECTAL CA SCREEN DOC REV: CPT | Performed by: INTERNAL MEDICINE

## 2021-06-22 PROCEDURE — 4040F PNEUMOC VAC/ADMIN/RCVD: CPT | Performed by: INTERNAL MEDICINE

## 2021-06-22 RX ORDER — METHIMAZOLE 5 MG/1
TABLET ORAL
Qty: 30 TABLET | Refills: 3 | Status: SHIPPED | OUTPATIENT
Start: 2021-06-22 | End: 2021-06-22 | Stop reason: SDUPTHER

## 2021-06-22 RX ORDER — METHIMAZOLE 5 MG/1
TABLET ORAL
Qty: 30 TABLET | Refills: 3 | Status: SHIPPED | OUTPATIENT
Start: 2021-06-22 | End: 2021-08-28 | Stop reason: SDUPTHER

## 2021-06-22 NOTE — PROGRESS NOTES
2021    TELEHEALTH EVALUATION -- Audio/Visual (During PMQRL-10 public health emergency)    Due to COVID 19 outbreak, patient's office visit was converted to a virtual visit. Patient was contacted and agreed to proceed with a virtual visit via Doxy. me  The risks and benefits of converting to a virtual visit were discussed in light of the current infectious disease epidemic. Patient also understood that insurance coverage and co-pays are up to their individual insurance plans. HPI: Video visit patient was at home I was at my office    Angella Salinas (:  1950) has requested an audio/video evaluation for the following concern(s):    Hypothyroidism patient on Tapazole 5 mg half a pill once a week plus Inderal also complains of palpitations reviewed labs thyroid function tests show normal free T4 TSH is still suppressed    Results for Yelitza Downing (MRN [id number]) as of 2021 16:37   Ref. Range 3/4/2021 12:07 2021 00:00 2021 10:55   TSH Latest Ref Range: 0.440 - 3.860 uIU/mL 0.036 (L)  0.012 (L)   T4 Free Latest Ref Range: 0.84 - 1.68 ng/dL 1.12  1.21   WBC Latest Ref Range: 4.8 - 10.8 K/uL 7.8  8.0   RBC Latest Ref Range: 4.20 - 5.40 M/uL 4.80  4.72   Hemoglobin Quant Latest Ref Range: 12.0 - 16.0 g/dL 14.2  14.1   Hematocrit Latest Ref Range: 37.0 - 47.0 % 41.9  40.2   MCV Latest Ref Range: 82.0 - 100.0 fL 87.3  85.1   MCH Latest Ref Range: 27.0 - 31.3 pg 29.5  29.9   MCHC Latest Ref Range: 33.0 - 37.0 % 33.8  35.1   RDW Latest Ref Range: 11.5 - 14.5 % 13.5  12.8   Platelet Count Latest Ref Range: 130 - 400 K/uL 275  286       Review of Systems   Cardiovascular: Positive for palpitations. All other systems reviewed and are negative. Prior to Visit Medications    Medication Sig Taking?  Authorizing Provider   losartan-hydroCHLOROthiazide (HYZAAR) 100-12.5 MG per tablet TAKE 1 TABLET BY MOUTH  DAILY  Priyanka Chen MD   propranolol (INDERAL LA) 60 MG extended release capsule to this being a TeleHealth encounter, evaluation of the following organ systems is limited: Vitals/Constitutional/EENT/Resp/CV/GI//MS/Neuro/Skin/Heme-Lymph-Imm. ASSESSMENT/PLAN:     Diagnosis Orders   1. Hyperthyroidism     2. Hot thyroid nodule  T4, Free    TSH without Reflex    CBC     Orders Placed This Encounter   Procedures    T4, Free     Standing Status:   Future     Standing Expiration Date:   2022    TSH without Reflex     Standing Status:   Future     Standing Expiration Date:   2022    CBC     Standing Status:   Future     Standing Expiration Date:   2022       Orders Placed This Encounter   Medications    DISCONTD: methIMAzole (TAPAZOLE) 5 MG tablet     Si/2 po once a week     Dispense:  30 tablet     Refill:  3    methIMAzole (TAPAZOLE) 5 MG tablet     Si/2 po twice  a week updated dose     Dispense:  30 tablet     Refill:  3     Increased dose of Tapazole to 2.5 mg twice a week  Continue Inderal LA 60 mg daily repeat thyroid function test in 2 months  An  electronic signature was used to authenticate this note. --Kalvin Cockayne, MD on 2021 at 4:37 PM        Pursuant to the emergency declaration under the [facility] [address] waiver authority and the [employer] Act, this Virtual  Visit was conducted, with patient's consent, to reduce the patient's risk of exposure to COVID-19 and provide continuity of care for an established patient. Services were provided through a video synchronous discussion virtually to substitute for in-person clinic visit.

## 2021-07-16 ENCOUNTER — OFFICE VISIT (OUTPATIENT)
Dept: PRIMARY CARE CLINIC | Age: 71
End: 2021-07-16
Payer: MEDICARE

## 2021-07-16 VITALS
HEIGHT: 63 IN | TEMPERATURE: 97.7 F | BODY MASS INDEX: 25.87 KG/M2 | HEART RATE: 72 BPM | SYSTOLIC BLOOD PRESSURE: 110 MMHG | OXYGEN SATURATION: 97 % | WEIGHT: 146 LBS | DIASTOLIC BLOOD PRESSURE: 60 MMHG

## 2021-07-16 DIAGNOSIS — E78.5 HYPERLIPIDEMIA, UNSPECIFIED HYPERLIPIDEMIA TYPE: ICD-10-CM

## 2021-07-16 DIAGNOSIS — R73.9 HYPERGLYCEMIA: ICD-10-CM

## 2021-07-16 DIAGNOSIS — S83.282A ACUTE LATERAL MENISCUS TEAR OF LEFT KNEE, INITIAL ENCOUNTER: ICD-10-CM

## 2021-07-16 DIAGNOSIS — M25.562 ACUTE PAIN OF LEFT KNEE: Primary | ICD-10-CM

## 2021-07-16 PROCEDURE — G8417 CALC BMI ABV UP PARAM F/U: HCPCS | Performed by: INTERNAL MEDICINE

## 2021-07-16 PROCEDURE — 1123F ACP DISCUSS/DSCN MKR DOCD: CPT | Performed by: INTERNAL MEDICINE

## 2021-07-16 PROCEDURE — G8399 PT W/DXA RESULTS DOCUMENT: HCPCS | Performed by: INTERNAL MEDICINE

## 2021-07-16 PROCEDURE — G8427 DOCREV CUR MEDS BY ELIG CLIN: HCPCS | Performed by: INTERNAL MEDICINE

## 2021-07-16 PROCEDURE — 1090F PRES/ABSN URINE INCON ASSESS: CPT | Performed by: INTERNAL MEDICINE

## 2021-07-16 PROCEDURE — 4040F PNEUMOC VAC/ADMIN/RCVD: CPT | Performed by: INTERNAL MEDICINE

## 2021-07-16 PROCEDURE — 3017F COLORECTAL CA SCREEN DOC REV: CPT | Performed by: INTERNAL MEDICINE

## 2021-07-16 PROCEDURE — 1036F TOBACCO NON-USER: CPT | Performed by: INTERNAL MEDICINE

## 2021-07-16 PROCEDURE — 99214 OFFICE O/P EST MOD 30 MIN: CPT | Performed by: INTERNAL MEDICINE

## 2021-07-16 ASSESSMENT — ENCOUNTER SYMPTOMS
CHOKING: 0
PHOTOPHOBIA: 0
APNEA: 0
BLOOD IN STOOL: 0
FACIAL SWELLING: 0
ABDOMINAL DISTENTION: 0
HEARTBURN: 1

## 2021-07-16 NOTE — PROGRESS NOTES
Wil Calixto 66 y.o. female presents today with   Chief Complaint   Patient presents with    6 Month Follow-Up    Hypertension    Gastroesophageal Reflux    Knee Injury     Hear a loud \"crack\" after falling on stair step while on vacation x2 weeks ago. Ortho appt scheduled for next week       Hypertension  This is a chronic problem. The current episode started more than 1 year ago. The problem is unchanged. The problem is controlled. Associated symptoms include anxiety. Pertinent negatives include no chest pain, headaches or palpitations. Gastroesophageal Reflux  She complains of heartburn. She reports no chest pain or no choking. This is a recurrent problem. The current episode started more than 1 year ago. She has tried a PPI for the symptoms. The treatment provided mild relief. Knee Pain   The incident occurred more than 1 week ago. Incident location: vacation. The injury mechanism was an inversion injury (miss a step). The pain is present in the left knee. The pain has been fluctuating since onset. Associated symptoms include a loss of motion and numbness.        Past Medical History:   Diagnosis Date    Bronchitis with bronchospasm 5/24/2019    Bursitis of both hips     Cancer ([address]     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    Contusion of right eyelid 6/5/2019    Cough 11/17/2016    Depression     Dizziness 6/18/2015    Dizziness 6/18/2015    Gastritis with hemorrhage     GERD (gastroesophageal reflux disease)     Heart palpitations 3/12/2020    Hereditary motor and sensory neuropathy 2019    Hypertension     Hypertension     Need for pneumococcal vaccination 6/5/2019    Osteoarthritis     Seasonal allergies 11/3/2016    SOB (shortness of breath) 6/18/2015    SOB (shortness of breath) 6/18/2015     Patient Active Problem List    Diagnosis Date Noted    Palpitations 03/12/2020    Hyponatremia 09/07/2019    Hypercalcemia 09/07/2019    Hypernatremia 09/07/2019    Muscle pain 09/07/2019  Ataxia 2019    Recurrent falls 2019    Bronchitis 2019    At high risk for falls 2019    Goiter, toxic, multinodular 2018    Thyroid nodule 10/25/2018    Bilateral inguinal hernia 2017    Seasonal allergies 2016    Depressive disorder 2013    Diverticulosis of large intestine 2013    Gastroesophageal reflux disease 2013    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    Hypertensive disorder 2013     Past Surgical History:   Procedure Laterality Date    ABDOMEN SURGERY      CATARACT REMOVAL WITH IMPLANT  2018    bilateral  and      SECTION      FINGER REPLANTATION      right thumb implant due to arthritis    HYSTERECTOMY      KNEE ARTHROSCOPY      bilateral    LOBECTOMY  1974    lower back    NOSE SURGERY      sinus    TUBAL LIGATION      TUMOR REMOVAL  1973    2x on back side of neck     Family History   Adopted: Yes   Problem Relation Age of Onset    Other Mother     Other Sister     Colon Cancer Brother      Social History     Socioeconomic History    Marital status:      Spouse name: None    Number of children: None    Years of education: None    Highest education level: None   Occupational History    None   Tobacco Use    Smoking status: Former Smoker     Packs/day: 1.00     Years: 21.00     Pack years: 21.00     Quit date:      Years since quittin.5    Smokeless tobacco: Never Used   Substance and Sexual Activity    Alcohol use:  Yes     Alcohol/week: 0.0 standard drinks     Comment: occassionally    Drug use: No    Sexual activity: None   Other Topics Concern    None   Social History Narrative    None     Social Determinants of Health     Financial Resource Strain: Low Risk     Difficulty of Paying Living Expenses: Not hard at all   Food Insecurity: No Food Insecurity    Worried About Running Out of Food in the Last Year: Never true    Ramos of Food in the Last Year: Never true Transportation Needs:     Lack of Transportation (Medical):  Lack of Transportation (Non-Medical):    Physical Activity:     Days of Exercise per Week:     Minutes of Exercise per Session:    Stress:     Feeling of Stress :    Social Connections:     Frequency of Communication with Friends and Family:     Frequency of Social Gatherings with Friends and Family:     Attends Islam Services:     Active Member of Clubs or Organizations:     Attends Club or Organization Meetings:     Marital Status:    Intimate Partner Violence:     Fear of Current or Ex-Partner:     Emotionally Abused:     Physically Abused:     Sexually Abused: Allergies   Allergen Reactions    Meperidine Other (See Comments)     Upset stomach    Morphine Other (See Comments)     Upset stomach    Nasal Spray     Percocet [Oxycodone-Acetaminophen]      Upset stomach       Review of Systems   Constitutional: Negative for fever. HENT: Negative for facial swelling and nosebleeds. Eyes: Negative for photophobia and visual disturbance. Respiratory: Negative for apnea and choking. Cardiovascular: Negative for chest pain and palpitations. Gastrointestinal: Positive for heartburn. Negative for abdominal distention and blood in stool. Genitourinary: Negative for enuresis and hematuria. Musculoskeletal: Positive for arthralgias and gait problem. Negative for joint swelling. Skin: Negative for rash. Neurological: Positive for numbness. Negative for syncope, speech difficulty and headaches. Hematological: Does not bruise/bleed easily. Psychiatric/Behavioral: Negative for hallucinations and suicidal ideas. Vitals:    07/16/21 1538   BP: 110/60   Pulse: 72   Temp: 97.7 °F (36.5 °C)   SpO2: 97%   Weight: 146 lb (66.2 kg)   Height: 5' 3\" (1.6 m)       Physical Exam  Constitutional:       Appearance: She is well-developed. HENT:      Head: Normocephalic and atraumatic.    Eyes:      Pupils: Pupils are equal, round, and reactive to light. Cardiovascular:      Rate and Rhythm: Normal rate and regular rhythm. Heart sounds: Normal heart sounds. Pulmonary:      Effort: No respiratory distress. Breath sounds: Normal breath sounds. No wheezing. Abdominal:      General: There is no distension. Musculoskeletal:         General: Normal range of motion. Cervical back: Normal range of motion. Thoracic back: Spasms present. Lumbar back: Spasms present. Left lower leg: Swelling, tenderness and bony tenderness present. Edema present. Legs:    Skin:     Coloration: Skin is not jaundiced. Neurological:      Mental Status: She is oriented to person, place, and time. Cranial Nerves: No cranial nerve deficit. Psychiatric:         Mood and Affect: Mood normal.        Assessment/Plan  Laura Maharaj was seen today for 6 month follow-up, hypertension, gastroesophageal reflux and knee injury. Diagnoses and all orders for this visit:    Acute pain of left knee  -     XR KNEE LEFT (1-2 VIEWS); Future    Hyperlipidemia, unspecified hyperlipidemia type  -     Lipid, Fasting; Future  -     Comprehensive Metabolic Panel; Future    Hyperglycemia  -     Hemoglobin A1C; Future    Acute lateral meniscus tear of left knee, initial encounter  -     MRI KNEE LEFT WO CONTRAST; Future        No follow-ups on file.     Yamileth Manzo MD

## 2021-07-19 ENCOUNTER — TELEPHONE (OUTPATIENT)
Dept: PRIMARY CARE CLINIC | Age: 71
End: 2021-07-19

## 2021-07-19 DIAGNOSIS — E78.5 HYPERLIPIDEMIA, UNSPECIFIED HYPERLIPIDEMIA TYPE: ICD-10-CM

## 2021-07-19 DIAGNOSIS — R73.9 HYPERGLYCEMIA: ICD-10-CM

## 2021-07-19 LAB
ALBUMIN SERPL-MCNC: 4.2 G/DL (ref 3.5–4.6)
ALP BLD-CCNC: 110 U/L (ref 40–130)
ALT SERPL-CCNC: 10 U/L (ref 0–33)
ANION GAP SERPL CALCULATED.3IONS-SCNC: 9 MEQ/L (ref 9–15)
AST SERPL-CCNC: 14 U/L (ref 0–35)
BILIRUB SERPL-MCNC: 0.4 MG/DL (ref 0.2–0.7)
BUN BLDV-MCNC: 25 MG/DL (ref 8–23)
CALCIUM SERPL-MCNC: 10.5 MG/DL (ref 8.5–9.9)
CHLORIDE BLD-SCNC: 107 MEQ/L (ref 95–107)
CHOLESTEROL, FASTING: 200 MG/DL (ref 0–199)
CO2: 26 MEQ/L (ref 20–31)
CREAT SERPL-MCNC: 0.67 MG/DL (ref 0.5–0.9)
GFR AFRICAN AMERICAN: >60
GFR NON-AFRICAN AMERICAN: >60
GLOBULIN: 1.9 G/DL (ref 2.3–3.5)
GLUCOSE BLD-MCNC: 76 MG/DL (ref 70–99)
HBA1C MFR BLD: 4.7 % (ref 4.8–5.9)
HDLC SERPL-MCNC: 41 MG/DL (ref 40–59)
LDL CHOLESTEROL CALCULATED: 114 MG/DL (ref 0–129)
POTASSIUM SERPL-SCNC: 4.3 MEQ/L (ref 3.4–4.9)
SODIUM BLD-SCNC: 142 MEQ/L (ref 135–144)
TOTAL PROTEIN: 6.1 G/DL (ref 6.3–8)
TRIGLYCERIDE, FASTING: 223 MG/DL (ref 0–150)

## 2021-07-19 NOTE — TELEPHONE ENCOUNTER
----- Message from Michelle Armenta sent at 7/19/2021  9:43 AM EDT -----  Subject: Message to Provider    QUESTIONS  Information for Provider? patient states she was advised to call when she   will be coming in for xray. She will be in today  ---------------------------------------------------------------------------  --------------  CALL BACK INFO  What is the best way for the office to contact you? OK to leave message on   voicemail  Preferred Call Back Phone Number? [phone]  ---------------------------------------------------------------------------  --------------  SCRIPT ANSWERS  Relationship to Patient?  Self

## 2021-07-22 ENCOUNTER — TELEPHONE (OUTPATIENT)
Dept: PRIMARY CARE CLINIC | Age: 71
End: 2021-07-22

## 2021-07-22 NOTE — TELEPHONE ENCOUNTER
This was responded a few days ago  Mild to moderate arthritis back knee. Depending which ortho they maybe able to see he xray on computer. Ask the ortho office.   Or they need to  a disk

## 2021-08-19 ENCOUNTER — OFFICE VISIT (OUTPATIENT)
Dept: CARDIOLOGY CLINIC | Age: 71
End: 2021-08-19
Payer: MEDICARE

## 2021-08-19 VITALS
DIASTOLIC BLOOD PRESSURE: 62 MMHG | WEIGHT: 145 LBS | HEART RATE: 70 BPM | HEIGHT: 63 IN | BODY MASS INDEX: 25.69 KG/M2 | OXYGEN SATURATION: 97 % | SYSTOLIC BLOOD PRESSURE: 120 MMHG

## 2021-08-19 DIAGNOSIS — R00.2 PALPITATION: Primary | ICD-10-CM

## 2021-08-19 PROCEDURE — G8399 PT W/DXA RESULTS DOCUMENT: HCPCS | Performed by: INTERNAL MEDICINE

## 2021-08-19 PROCEDURE — G8417 CALC BMI ABV UP PARAM F/U: HCPCS | Performed by: INTERNAL MEDICINE

## 2021-08-19 PROCEDURE — 4040F PNEUMOC VAC/ADMIN/RCVD: CPT | Performed by: INTERNAL MEDICINE

## 2021-08-19 PROCEDURE — 3017F COLORECTAL CA SCREEN DOC REV: CPT | Performed by: INTERNAL MEDICINE

## 2021-08-19 PROCEDURE — G8427 DOCREV CUR MEDS BY ELIG CLIN: HCPCS | Performed by: INTERNAL MEDICINE

## 2021-08-19 PROCEDURE — 99214 OFFICE O/P EST MOD 30 MIN: CPT | Performed by: INTERNAL MEDICINE

## 2021-08-19 PROCEDURE — 1123F ACP DISCUSS/DSCN MKR DOCD: CPT | Performed by: INTERNAL MEDICINE

## 2021-08-19 PROCEDURE — 1036F TOBACCO NON-USER: CPT | Performed by: INTERNAL MEDICINE

## 2021-08-19 PROCEDURE — 1090F PRES/ABSN URINE INCON ASSESS: CPT | Performed by: INTERNAL MEDICINE

## 2021-08-19 ASSESSMENT — ENCOUNTER SYMPTOMS
SHORTNESS OF BREATH: 1
WHEEZING: 0
FACIAL SWELLING: 0
NAUSEA: 0
VOICE CHANGE: 0
ANAL BLEEDING: 0
CHEST TIGHTNESS: 0
BACK PAIN: 1
BLOOD IN STOOL: 0
TROUBLE SWALLOWING: 0
ABDOMINAL DISTENTION: 0
VOMITING: 0
COLOR CHANGE: 0
APNEA: 0

## 2021-08-19 NOTE — PROGRESS NOTES
[facility] CARDIOLOGY OFFICE FOLLOW-UP      Patient: Charo Ritter  YOB: 1950  MRN: [id number]    Chief Complaint:  Chief Complaint   Patient presents with    Medication Check    Palpitations         Subjective/HPI:  8/19/21: Patient presents today for follow-up of palpitations. She still gets occasional episode of palpitation. She sees . I told her to see him when he is here. He is here every other weekend on Saturday. .  She does not want to go [place] to see him, that is a little far. Blood pressure remains controlled. I have advised her to take an extra Inderal when she has significant palpitations. She did get the Covid vaccine and will wait for the booster shot. She will see me in 6 months. She follows with Dr. Felix Muller. 5/27/21: Patient presents today for evaluation and follow-up of hypertension. Occasionally has some cough \" especially in during the allergy season. Does not appear to be in congestive heart failure. Last stress test ejection fraction was normal.  She has hypertension hypertensive heart disease. No chest pain. Occasional palpitations will give a prescription for Singulair. I do not think is CHF.   See me in a month        7/16/2020: Patient presents today for follow-up of hypertension.  And palpitation.  She has postnasal discharge.  No fever.  Occasional and rare she will get some palpitation.  No CHF.  Non-smoker.  Retired nurse from [employer].  No ankle edema.  Give a prescription for this Augmentin 875 twice daily for 2 weeks and follow with Dr. Slime Dumont test is negative        3/12/2020: Patient presents today for follow-up of hypertension.  Retired nurse from [employer] sees Dr. April Strong doing well. Samir Caldera was on vacation in [address] 1 episode of palpitation that lasted almost a whole day.  No excessive alcohol use.  I have told her if that happens she can always take an extra metoprolol for that day. Samir Caldera will see me in 1 year       3/7/19: Patient presents today for follow-up of hypertension. Remains extremely well-controlled. On a combination of losartan hydrochlorothiazide and Inderal. No chest pain congestive heart failure symptoms. I had sent her to Dr. Pradeep Hernandez for primary. Retired nurse from [address] [place] [address]. See me in one year        3/8/2018: Patient presents today for Evaluation of hypertension. Doing well no chest pain no shortness of breath is still gets occasional palpitations. Has a history of GERD which is stable. See me in one year.        11/17/16: For fu of HTN. Saw me 2 weeks ago. Was having allergy?/bronchitis. Treated with claritin,singulai and ATB. Still some residual symptoms. Get CXR today. See me in [facility]       11/3/16: For fu of HTN. Long standing. Retired nurse at [employer]. On losartan for ever. Has allergy. Gets around Oct.Couch,nasal congestion. I dont think losartan allergy. Give claritin,singulair and Z andre. See in 2 weeks.       12/24/15: Doing well. BP controlled. See in 1 year. Will see Pradeep Hernandez for primary care.       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 Hypertensive. On ARB/Bystolic for a longtime. Will set up with Pradeep Hernandez for primary care. Renew meds for 1 year. See in 6m.                 Past Medical History:   Diagnosis Date    Bronchitis with bronchospasm 5/24/2019    Bursitis of both hips     Cancer ([address]     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    Contusion of right eyelid 6/5/2019    Cough 11/17/2016    Depression     Dizziness 6/18/2015    Dizziness 6/18/2015    Gastritis with hemorrhage     GERD (gastroesophageal reflux disease)     Heart palpitations 3/12/2020    Hereditary motor and sensory neuropathy 2019    Hypertension     Hypertension     Need for pneumococcal vaccination 6/5/2019    Osteoarthritis     Seasonal allergies 11/3/2016    SOB (shortness of breath) 6/18/2015    SOB (shortness of breath) 6/18/2015       Past Surgical History:   Procedure Laterality Date    ABDOMEN SURGERY      CATARACT REMOVAL WITH IMPLANT  2018    bilateral  and      SECTION      FINGER REPLANTATION      right thumb implant due to arthritis    HYSTERECTOMY  1993    KNEE ARTHROSCOPY      bilateral    LOBECTOMY  1974    lower back    NOSE SURGERY      sinus    TUBAL LIGATION      TUMOR REMOVAL  1973    2x on back side of neck       Family History   Adopted: Yes   Problem Relation Age of Onset    Other Mother     Other Sister     Colon Cancer Brother        Social History     Socioeconomic History    Marital status:      Spouse name: None    Number of children: None    Years of education: None    Highest education level: None   Occupational History    None   Tobacco Use    Smoking status: Former Smoker     Packs/day: 1.00     Years: 21.00     Pack years: 21.00     Quit date:      Years since quittin.6    Smokeless tobacco: Never Used   Substance and Sexual Activity    Alcohol use: Yes     Alcohol/week: 0.0 standard drinks     Comment: occassionally    Drug use: No    Sexual activity: None   Other Topics Concern    None   Social History Narrative    None     Social Determinants of Health     Financial Resource Strain: Low Risk     Difficulty of Paying Living Expenses: Not hard at all   Food Insecurity: No Food Insecurity    Worried About Running Out of Food in the Last Year: Never true    Ramos of Food in the Last Year: Never true   Transportation Needs:     Lack of Transportation (Medical):      Lack of Transportation (Non-Medical):    Physical Activity:     Days of Exercise per Week:     Minutes of Exercise per Session:    Stress:     Feeling of Stress :    Social Connections:     Frequency of Communication with Friends and Family:     Frequency of Social Gatherings with Friends and Family:     Attends [employer] Services:     Active Member of Clubs or Organizations:     Attends Club or Organization Meetings:     Marital Status:    Intimate Partner Violence:  Fear of Current or Ex-Partner:     Emotionally Abused:     Physically Abused:     Sexually Abused: Allergies   Allergen Reactions    Meperidine Other (See Comments)     Upset stomach    Morphine Other (See Comments)     Upset stomach    Nasal Spray     Percocet [Oxycodone-Acetaminophen]      Upset stomach       Current Outpatient Medications   Medication Sig Dispense Refill    methIMAzole (TAPAZOLE) 5 MG tablet 1/2 po twice  a week updated dose 30 tablet 3    losartan-hydroCHLOROthiazide (HYZAAR) 100-12.5 MG per tablet TAKE 1 TABLET BY MOUTH  DAILY 90 tablet 3    propranolol (INDERAL LA) 60 MG extended release capsule TAKE ONE CAPSULE BY MOUTH EVERY DAY 90 capsule 1    montelukast (SINGULAIR) 10 MG tablet Take 1 tablet by mouth nightly 90 tablet 1    folic acid (FOLVITE) 1 MG tablet Take 1 tablet by mouth daily 90 tablet 3    PARoxetine (PAXIL) 20 MG tablet Take 1 tablet by mouth nightly 90 tablet 3    Cholecalciferol (VITAMIN D) 50 MCG (2000 UT) TABS tablet Take 1 tablet by mouth daily 90 tablet 1    loratadine (CLARITIN) 10 MG tablet Take 1 tablet by mouth nightly 90 tablet 1    esomeprazole (NEXIUM) 20 MG delayed release capsule Take 1 capsule by mouth every morning (before breakfast) 90 capsule 1    diclofenac (VOLTAREN) 50 MG EC tablet Take 1 tablet by mouth 2 times daily 60 tablet 0    diclofenac sodium 1 % GEL        No current facility-administered medications for this visit. Review of Systems:   Review of Systems   Constitutional: Negative for activity change, appetite change, diaphoresis, fatigue and unexpected weight change. HENT: Negative for facial swelling, nosebleeds, trouble swallowing and voice change. Respiratory: Positive for shortness of breath. Negative for apnea, chest tightness and wheezing. Cardiovascular: Positive for palpitations. Negative for chest pain and leg swelling.    Gastrointestinal: Negative for abdominal distention, anal bleeding, blood in stool, nausea and vomiting. Genitourinary: Negative for decreased urine volume and dysuria. Musculoskeletal: Positive for back pain. Negative for gait problem and myalgias. Skin: Negative for color change, pallor, rash and wound. Neurological: Negative for dizziness, syncope, facial asymmetry, weakness, light-headedness, numbness and headaches. Hematological: Does not bruise/bleed easily. Psychiatric/Behavioral: Negative for agitation, behavioral problems, confusion, hallucinations and suicidal ideas. The patient is not nervous/anxious. All other systems reviewed and are negative. Review of System is negative except for as mentioned above. Physical Examination:    /62 (Site: Right Upper Arm, Position: Sitting, Cuff Size: Medium Adult)   Pulse 70   Ht 5' 3\" (1.6 m)   Wt 145 lb (65.8 kg)   SpO2 97%   BMI 25.69 kg/m²    Physical Exam   Constitutional: She appears healthy. HENT:   Nose: Nose normal.   Mouth/Throat: Dentition is normal. Oropharynx is clear. Eyes: Pupils are equal, round, and reactive to light. Cardiovascular: Normal rate, regular rhythm, S1 normal, S2 normal, normal heart sounds, intact distal pulses and normal pulses. No extrasystoles are present. Exam reveals no gallop. No murmur heard. Pulmonary/Chest: Effort normal and breath sounds normal. She has no wheezes. She has no rales. She exhibits no tenderness. Abdominal: Soft. Bowel sounds are normal. She exhibits no distension and no mass. There is no splenomegaly or hepatomegaly. There is no abdominal tenderness. Musculoskeletal:         General: No tenderness, deformity or edema. Normal range of motion. Cervical back: Normal range of motion. Neurological: She is alert and oriented to person, place, and time. She has normal motor skills and normal reflexes. Gait normal.   Skin: Skin is warm and dry.            Patient Active Problem List   Diagnosis    Depressive disorder    Diverticulosis of large intestine    Gastroesophageal reflux disease    Angiosarcoma (HCC)    Hypertensive disorder    Seasonal allergies    Bilateral inguinal hernia    Thyroid nodule    Goiter, toxic, multinodular    Bronchitis    At high risk for falls    Ataxia    Recurrent falls    Hyponatremia    Hypercalcemia    Hypernatremia    Muscle pain    Palpitations           No orders of the defined types were placed in this encounter. No orders of the defined types were placed in this encounter. Assessment/Orders:       ICD-10-CM    1. Palpitation  R00.2        No orders of the defined types were placed in this encounter. There are no discontinued medications. No orders of the defined types were placed in this encounter. Plan:    Patient was advised to take an extra inderal when she has significant palpitations    Stay on same medications.     See me in 6 months        Electronically signed by: Alejandra Ricks MD  8/19/2021 3:05 PM

## 2021-08-28 ENCOUNTER — OFFICE VISIT (OUTPATIENT)
Dept: ENDOCRINOLOGY | Age: 71
End: 2021-08-28
Payer: MEDICARE

## 2021-08-28 VITALS
HEIGHT: 63 IN | BODY MASS INDEX: 25.87 KG/M2 | OXYGEN SATURATION: 95 % | DIASTOLIC BLOOD PRESSURE: 87 MMHG | WEIGHT: 146 LBS | HEART RATE: 68 BPM | SYSTOLIC BLOOD PRESSURE: 140 MMHG

## 2021-08-28 DIAGNOSIS — E04.1 HOT THYROID NODULE: ICD-10-CM

## 2021-08-28 DIAGNOSIS — E05.90 HYPERTHYROIDISM: Primary | ICD-10-CM

## 2021-08-28 PROCEDURE — G8417 CALC BMI ABV UP PARAM F/U: HCPCS | Performed by: INTERNAL MEDICINE

## 2021-08-28 PROCEDURE — 1123F ACP DISCUSS/DSCN MKR DOCD: CPT | Performed by: INTERNAL MEDICINE

## 2021-08-28 PROCEDURE — 1090F PRES/ABSN URINE INCON ASSESS: CPT | Performed by: INTERNAL MEDICINE

## 2021-08-28 PROCEDURE — G8427 DOCREV CUR MEDS BY ELIG CLIN: HCPCS | Performed by: INTERNAL MEDICINE

## 2021-08-28 PROCEDURE — 4040F PNEUMOC VAC/ADMIN/RCVD: CPT | Performed by: INTERNAL MEDICINE

## 2021-08-28 PROCEDURE — 3017F COLORECTAL CA SCREEN DOC REV: CPT | Performed by: INTERNAL MEDICINE

## 2021-08-28 PROCEDURE — G8399 PT W/DXA RESULTS DOCUMENT: HCPCS | Performed by: INTERNAL MEDICINE

## 2021-08-28 PROCEDURE — 1036F TOBACCO NON-USER: CPT | Performed by: INTERNAL MEDICINE

## 2021-08-28 PROCEDURE — 99213 OFFICE O/P EST LOW 20 MIN: CPT | Performed by: INTERNAL MEDICINE

## 2021-08-28 RX ORDER — METHIMAZOLE 5 MG/1
TABLET ORAL
Qty: 30 TABLET | Refills: 3 | Status: SHIPPED | OUTPATIENT
Start: 2021-08-28 | End: 2021-10-23 | Stop reason: SDUPTHER

## 2021-08-28 NOTE — PROGRESS NOTES
2021    Assessment:       Diagnosis Orders   1. Hyperthyroidism     2. Hot thyroid nodule  CBC    T4, Free    TSH without Reflex         PLAN:     Orders Placed This Encounter   Procedures    CBC     Standing Status:   Future     Standing Expiration Date:   2022    T4, Free     Standing Status:   Future     Standing Expiration Date:   2022    TSH without Reflex     Standing Status:   Future     Standing Expiration Date:   2022     Adjust dose of Tapazole  Follow-up in 2 to 3 months time  Orders Placed This Encounter   Medications    methIMAzole (TAPAZOLE) 5 MG tablet     Si/2 po three   Times a week     Dispense:  30 tablet     Refill:  3       Subjective:     Chief Complaint   Patient presents with    Thyroid Problem     Vitals:    21 1152 21 1156   BP: (!) 141/89 (!) 140/87   Pulse: 68    SpO2: 95%    Weight: 146 lb (66.2 kg)    Height: 5' 3\" (1.6 m)      Wt Readings from Last 3 Encounters:   21 146 lb (66.2 kg)   21 145 lb (65.8 kg)   21 146 lb (66.2 kg)     BP Readings from Last 3 Encounters:   21 (!) 140/87   21 120/62   21 110/60     Follow-up on hyperthyroidism patient on Tapazole 5:30 milligram 2 days a week recent thyroid function test slightly suppressed TSH symptoms otherwise are stable    Other  This is a chronic (Hyperthyroidism) problem. The current episode started more than 1 year ago. The problem occurs intermittently. The problem has been waxing and waning. Pertinent negatives include no neck pain. Exacerbated by: Overactive hyperfunctioning nodule. Treatments tried: Tapazole. The treatment provided moderate relief.      Past Medical History:   Diagnosis Date    Bronchitis with bronchospasm 2019    Bursitis of both hips     Cancer ([facility] [address]     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    Contusion of right eyelid 2019    Cough 2016    Depression     Dizziness 2015    Dizziness 2015    Gastritis with hemorrhage  GERD (gastroesophageal reflux disease)     Heart palpitations 3/12/2020    Hereditary motor and sensory neuropathy     Hypertension     Hypertension     Need for pneumococcal vaccination 2019    Osteoarthritis     Seasonal allergies 11/3/2016    SOB (shortness of breath) 2015    SOB (shortness of breath) 2015     Past Surgical History:   Procedure Laterality Date    ABDOMEN SURGERY      CATARACT REMOVAL WITH IMPLANT  2018    bilateral  and      SECTION      FINGER REPLANTATION      right thumb implant due to arthritis    HYSTERECTOMY      KNEE ARTHROSCOPY      bilateral    LOBECTOMY  1974    lower back    NOSE SURGERY      sinus    TUBAL LIGATION      TUMOR REMOVAL  1973    2x on back side of neck     Social History     Socioeconomic History    Marital status:      Spouse name: Not on file    Number of children: Not on file    Years of education: Not on file    Highest education level: Not on file   Occupational History    Not on file   Tobacco Use    Smoking status: Former Smoker     Packs/day: 1.00     Years: 21.00     Pack years: 21.00     Quit date:      Years since quittin.6    Smokeless tobacco: Never Used   Substance and Sexual Activity    Alcohol use: Yes     Alcohol/week: 0.0 standard drinks     Comment: occassionally    Drug use: No    Sexual activity: Not on file   Other Topics Concern    Not on file   Social History Narrative    Not on file     Social Determinants of Health     Financial Resource Strain: Low Risk     Difficulty of Paying Living Expenses: Not hard at all   Food Insecurity: No Food Insecurity    Worried About [address] in the Last Year: Never true    [facility] in the Last Year: Never true   Transportation Needs:     Lack of Transportation (Medical):      Lack of Transportation (Non-Medical):    Physical Activity:     Days of Exercise per Week:     Minutes of Exercise per Session:    Stress:     Feeling of Stress :    Social Connections:     Frequency of Communication with Friends and Family:     Frequency of Social Gatherings with Friends and Family:     Attends Holiness Services:     Active Member of Clubs or Organizations:     Attends Club or Organization Meetings:     Marital Status:    Intimate Partner Violence:     Fear of Current or Ex-Partner:     Emotionally Abused:     Physically Abused:     Sexually Abused:      Family History   Adopted: Yes   Problem Relation Age of Onset    Other Mother     Other Sister     Colon Cancer Brother      Allergies   Allergen Reactions    Meperidine Other (See Comments)     Upset stomach    Morphine Other (See Comments)     Upset stomach    Nasal Spray     Percocet [Oxycodone-Acetaminophen]      Upset stomach       Current Outpatient Medications:     methIMAzole (TAPAZOLE) 5 MG tablet, 1/2 po twice  a week updated dose, Disp: 30 tablet, Rfl: 3    losartan-hydroCHLOROthiazide (HYZAAR) 100-12.5 MG per tablet, TAKE 1 TABLET BY MOUTH  DAILY, Disp: 90 tablet, Rfl: 3    propranolol (INDERAL LA) 60 MG extended release capsule, TAKE ONE CAPSULE BY MOUTH EVERY DAY, Disp: 90 capsule, Rfl: 1    montelukast (SINGULAIR) 10 MG tablet, Take 1 tablet by mouth nightly, Disp: 90 tablet, Rfl: 1    folic acid (FOLVITE) 1 MG tablet, Take 1 tablet by mouth daily, Disp: 90 tablet, Rfl: 3    PARoxetine (PAXIL) 20 MG tablet, Take 1 tablet by mouth nightly, Disp: 90 tablet, Rfl: 3    Cholecalciferol (VITAMIN D) 50 MCG (2000 UT) TABS tablet, Take 1 tablet by mouth daily, Disp: 90 tablet, Rfl: 1    loratadine (CLARITIN) 10 MG tablet, Take 1 tablet by mouth nightly, Disp: 90 tablet, Rfl: 1    esomeprazole (NEXIUM) 20 MG delayed release capsule, Take 1 capsule by mouth every morning (before breakfast), Disp: 90 capsule, Rfl: 1    diclofenac (VOLTAREN) 50 MG EC tablet, Take 1 tablet by mouth 2 times daily, Disp: 60 tablet, Rfl: 0    diclofenac sodium 1 % GEL, , Disp: , Rfl:   Lab Results   Component Value Date     07/19/2021    K 4.3 07/19/2021     07/19/2021    CO2 26 07/19/2021    BUN 25 (H) 07/19/2021    CREATININE 0.67 07/19/2021    GLUCOSE 76 07/19/2021    CALCIUM 10.5 (H) 07/19/2021    PROT 6.1 (L) 07/19/2021    LABALBU 4.2 07/19/2021    BILITOT 0.4 07/19/2021    ALKPHOS 110 07/19/2021    AST 14 07/19/2021    ALT 10 07/19/2021    LABGLOM >60.0 07/19/2021    GFRAA >60.0 07/19/2021    GLOB 1.9 (L) 07/19/2021     Lab Results   Component Value Date    WBC 7.9 08/19/2021    HGB 14.8 08/19/2021    HCT 43.6 08/19/2021    MCV 88.2 08/19/2021     08/19/2021     Lab Results   Component Value Date    LABA1C 4.7 (L) 07/19/2021    LABA1C 4.8 07/31/2019     Lab Results   Component Value Date    CHOLFAST 200 (H) 07/19/2021    TRIGLYCFAST 223 (H) 07/19/2021    HDL 41 07/19/2021    HDL 47 07/31/2019    HDL 44 07/07/2015    LDLCALC 114 07/19/2021    LDLCALC 118 07/31/2019    LDLCALC 103 07/07/2015    CHOL 197 07/31/2019    CHOL 177 07/07/2015    TRIG 159 (H) 07/31/2019    TRIG 187 07/07/2015     No results found for: TESTM  Lab Results   Component Value Date    TSH 0.028 (L) 08/19/2021    TSH 0.012 (L) 06/14/2021    TSH 0.036 (L) 03/04/2021    TSHREFLEX 0.037 (L) 05/06/2019    T4FREE 1.19 08/19/2021    T4FREE 1.21 06/14/2021    T4FREE 1.12 03/04/2021     Lab Results   Component Value Date    TPOABS <10.0 03/12/2020       Review of Systems   Cardiovascular: Negative. Endocrine: Negative. Musculoskeletal: Negative for neck pain. All other systems reviewed and are negative. Objective:   Physical Exam  Vitals reviewed. Constitutional:       Appearance: Normal appearance. She is normal weight. HENT:      Head: Normocephalic and atraumatic. Hair is normal.      Right Ear: External ear normal.      Left Ear: External ear normal.      Nose: Nose normal.   Eyes:      General: No scleral icterus. Right eye: No discharge.          Left eye: No discharge. Extraocular Movements: Extraocular movements intact. Conjunctiva/sclera: Conjunctivae normal.   Neck:      Trachea: Trachea normal.   Cardiovascular:      Rate and Rhythm: Normal rate. Pulmonary:      Effort: Pulmonary effort is normal.   Musculoskeletal:         General: Normal range of motion. Cervical back: Normal range of motion and neck supple. Neurological:      General: No focal deficit present. Mental Status: She is alert and oriented to person, place, and time.    Psychiatric:         Mood and Affect: Mood normal.         Behavior: Behavior normal.

## 2021-09-29 RX ORDER — MONTELUKAST SODIUM 10 MG/1
TABLET ORAL
Qty: 90 TABLET | Refills: 3 | Status: SHIPPED | OUTPATIENT
Start: 2021-09-29

## 2021-09-29 NOTE — TELEPHONE ENCOUNTER
Requesting medication refill. Please approve or deny this request.    Rx requested:  Requested Prescriptions     Pending Prescriptions Disp Refills    montelukast (SINGULAIR) 10 MG tablet [Pharmacy Med Name: Montelukast Sodium 10 MG Oral Tablet] 90 tablet 3     Sig: TAKE 1 TABLET BY MOUTH AT  NIGHT         Last Office Visit:   8/19/2021      Next Visit Date:  Future Appointments   Date Time Provider Roxy Garcia   10/18/2021 10:45 AM [facility]   10/23/2021 11:30 AM Conrad Martinez MD [place] [id number]   2/17/2022 10:30 AM Neelam Silva MD [facility]               Last refill 5/27/21. Please approve or deny.

## 2021-10-18 ENCOUNTER — OFFICE VISIT (OUTPATIENT)
Dept: PRIMARY CARE CLINIC | Age: 71
End: 2021-10-18
Payer: MEDICARE

## 2021-10-18 VITALS
TEMPERATURE: 97.8 F | BODY MASS INDEX: 25.87 KG/M2 | SYSTOLIC BLOOD PRESSURE: 132 MMHG | HEIGHT: 63 IN | BODY MASS INDEX: 26.05 KG/M2 | TEMPERATURE: 97.8 F | OXYGEN SATURATION: 99 % | OXYGEN SATURATION: 99 % | SYSTOLIC BLOOD PRESSURE: 132 MMHG | DIASTOLIC BLOOD PRESSURE: 88 MMHG | WEIGHT: 146 LBS | WEIGHT: 147 LBS | HEART RATE: 65 BPM | HEIGHT: 63 IN | HEART RATE: 65 BPM | DIASTOLIC BLOOD PRESSURE: 88 MMHG

## 2021-10-18 DIAGNOSIS — Z87.891 PERSONAL HISTORY OF TOBACCO USE: ICD-10-CM

## 2021-10-18 DIAGNOSIS — Z78.9 CURRENT NON-SMOKER: ICD-10-CM

## 2021-10-18 DIAGNOSIS — M54.41 CHRONIC BILATERAL LOW BACK PAIN WITH BILATERAL SCIATICA: ICD-10-CM

## 2021-10-18 DIAGNOSIS — Z23 NEED FOR PNEUMOCOCCAL VACCINATION: ICD-10-CM

## 2021-10-18 DIAGNOSIS — I10 ESSENTIAL HYPERTENSION: Primary | ICD-10-CM

## 2021-10-18 DIAGNOSIS — M54.42 CHRONIC BILATERAL LOW BACK PAIN WITH BILATERAL SCIATICA: ICD-10-CM

## 2021-10-18 DIAGNOSIS — R50.9 FEVER AND CHILLS: ICD-10-CM

## 2021-10-18 DIAGNOSIS — Z00.00 ROUTINE GENERAL MEDICAL EXAMINATION AT A HEALTH CARE FACILITY: Primary | ICD-10-CM

## 2021-10-18 DIAGNOSIS — G89.29 CHRONIC BILATERAL LOW BACK PAIN WITH BILATERAL SCIATICA: ICD-10-CM

## 2021-10-18 PROCEDURE — G8399 PT W/DXA RESULTS DOCUMENT: HCPCS | Performed by: INTERNAL MEDICINE

## 2021-10-18 PROCEDURE — 4040F PNEUMOC VAC/ADMIN/RCVD: CPT | Performed by: INTERNAL MEDICINE

## 2021-10-18 PROCEDURE — G8417 CALC BMI ABV UP PARAM F/U: HCPCS | Performed by: INTERNAL MEDICINE

## 2021-10-18 PROCEDURE — G8484 FLU IMMUNIZE NO ADMIN: HCPCS | Performed by: INTERNAL MEDICINE

## 2021-10-18 PROCEDURE — 1036F TOBACCO NON-USER: CPT | Performed by: INTERNAL MEDICINE

## 2021-10-18 PROCEDURE — 1123F ACP DISCUSS/DSCN MKR DOCD: CPT | Performed by: INTERNAL MEDICINE

## 2021-10-18 PROCEDURE — G0009 ADMIN PNEUMOCOCCAL VACCINE: HCPCS | Performed by: INTERNAL MEDICINE

## 2021-10-18 PROCEDURE — 87804 INFLUENZA ASSAY W/OPTIC: CPT | Performed by: INTERNAL MEDICINE

## 2021-10-18 PROCEDURE — G0439 PPPS, SUBSEQ VISIT: HCPCS | Performed by: INTERNAL MEDICINE

## 2021-10-18 PROCEDURE — 1090F PRES/ABSN URINE INCON ASSESS: CPT | Performed by: INTERNAL MEDICINE

## 2021-10-18 PROCEDURE — G0296 VISIT TO DETERM LDCT ELIG: HCPCS | Performed by: INTERNAL MEDICINE

## 2021-10-18 PROCEDURE — 3017F COLORECTAL CA SCREEN DOC REV: CPT | Performed by: INTERNAL MEDICINE

## 2021-10-18 PROCEDURE — 90732 PPSV23 VACC 2 YRS+ SUBQ/IM: CPT | Performed by: INTERNAL MEDICINE

## 2021-10-18 PROCEDURE — 99214 OFFICE O/P EST MOD 30 MIN: CPT | Performed by: INTERNAL MEDICINE

## 2021-10-18 PROCEDURE — G8427 DOCREV CUR MEDS BY ELIG CLIN: HCPCS | Performed by: INTERNAL MEDICINE

## 2021-10-18 RX ORDER — TRAMADOL HYDROCHLORIDE 50 MG/1
TABLET ORAL
COMMUNITY

## 2021-10-18 RX ORDER — LIDOCAINE 50 MG/G
PATCH TOPICAL
COMMUNITY

## 2021-10-18 RX ORDER — DULOXETIN HYDROCHLORIDE 30 MG/1
CAPSULE, DELAYED RELEASE ORAL
COMMUNITY

## 2021-10-18 SDOH — ECONOMIC STABILITY: FOOD INSECURITY: WITHIN THE PAST 12 MONTHS, THE FOOD YOU BOUGHT JUST DIDN'T LAST AND YOU DIDN'T HAVE MONEY TO GET MORE.: NEVER TRUE

## 2021-10-18 SDOH — ECONOMIC STABILITY: FOOD INSECURITY: WITHIN THE PAST 12 MONTHS, YOU WORRIED THAT YOUR FOOD WOULD RUN OUT BEFORE YOU GOT MONEY TO BUY MORE.: NEVER TRUE

## 2021-10-18 ASSESSMENT — ENCOUNTER SYMPTOMS
FACIAL SWELLING: 0
BLOOD IN STOOL: 0
BACK PAIN: 1
PHOTOPHOBIA: 0
APNEA: 0
ABDOMINAL DISTENTION: 0
ABDOMINAL PAIN: 0
COUGH: 0

## 2021-10-18 ASSESSMENT — LIFESTYLE VARIABLES
AUDIT-C TOTAL SCORE: INCOMPLETE
HOW OFTEN DO YOU HAVE A DRINK CONTAINING ALCOHOL: 0
HOW OFTEN DO YOU HAVE A DRINK CONTAINING ALCOHOL: NEVER
AUDIT TOTAL SCORE: INCOMPLETE

## 2021-10-18 ASSESSMENT — PATIENT HEALTH QUESTIONNAIRE - PHQ9
SUM OF ALL RESPONSES TO PHQ QUESTIONS 1-9: 1
SUM OF ALL RESPONSES TO PHQ9 QUESTIONS 1 & 2: 1
SUM OF ALL RESPONSES TO PHQ QUESTIONS 1-9: 1
2. FEELING DOWN, DEPRESSED OR HOPELESS: 1
SUM OF ALL RESPONSES TO PHQ QUESTIONS 1-9: 1
1. LITTLE INTEREST OR PLEASURE IN DOING THINGS: 0

## 2021-10-18 ASSESSMENT — SOCIAL DETERMINANTS OF HEALTH (SDOH): HOW HARD IS IT FOR YOU TO PAY FOR THE VERY BASICS LIKE FOOD, HOUSING, MEDICAL CARE, AND HEATING?: NOT VERY HARD

## 2021-10-18 NOTE — PROGRESS NOTES
Lala Teran 66 y.o. female presents today with   Chief Complaint   Patient presents with    3 Month Follow-Up    Hypertension    Knee Injury    Back Pain    Discuss Labs       Hypertension  This is a chronic problem. The current episode started more than 1 year ago. The problem is unchanged. The problem is controlled. Associated symptoms include anxiety. Pertinent negatives include no chest pain, headaches or palpitations. Back Pain  This is a chronic problem. The current episode started more than 1 year ago. The problem occurs daily. The problem has been waxing and waning since onset. The pain is present in the lumbar spine and gluteal. The quality of the pain is described as aching. The pain radiates to the left foot. The pain is at a severity of 7/10. The pain is severe. The symptoms are aggravated by twisting, standing and bending. Pertinent negatives include no abdominal pain, chest pain, fever or headaches. knee is fine now. Back pain she got shot in hip, by orthopeds. Mri done last week.     Past Medical History:   Diagnosis Date    Bronchitis with bronchospasm 05/24/2019    Bursitis of both hips     Cancer ([address]     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    Chronic bilateral low back pain     Chronic midline thoracic back pain     Contusion of right eyelid 06/05/2019    Cough 11/17/2016    Depression     Dizziness 06/18/2015    Dizziness 06/18/2015    Gastritis with hemorrhage     GERD (gastroesophageal reflux disease)     Heart palpitations 03/12/2020    Hereditary motor and sensory neuropathy 2019    Hypertension     Hypertension     Hyperthyroidism     seen dr Mame Anthony and dr Noe Mcallister Neck pain     Need for pneumococcal vaccination 06/05/2019    Osteoarthritis     Seasonal allergies 11/03/2016    Smoker     quit 2008, lobectomy left    SOB (shortness of breath) 06/18/2015    SOB (shortness of breath) 06/18/2015     Patient Active Problem List    Diagnosis Date Noted    Palpitations 2020    Hyponatremia 2019    Hypercalcemia 2019    Hypernatremia 2019    Muscle pain 2019    Ataxia 2019    Recurrent falls 2019    Bronchitis 2019    At high risk for falls 2019    Goiter, toxic, multinodular 2018    Thyroid nodule 10/25/2018    Bilateral inguinal hernia 2017    Seasonal allergies 2016    Depressive disorder 2013    Diverticulosis of large intestine 2013    Gastroesophageal reflux disease 2013    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    Hypertensive disorder 2013     Past Surgical History:   Procedure Laterality Date    ABDOMEN SURGERY      CATARACT REMOVAL WITH IMPLANT  2018    bilateral  and      SECTION      FINGER REPLANTATION      right thumb implant due to arthritis    HYSTERECTOMY      KNEE ARTHROSCOPY      bilateral    LOBECTOMY  1974    lower back    NOSE SURGERY      sinus    TUBAL LIGATION      TUMOR REMOVAL  1973    2x on back side of neck     Family History   Adopted: Yes   Problem Relation Age of Onset    Other Mother     Other Sister     Colon Cancer Brother      Social History     Socioeconomic History    Marital status:      Spouse name: None    Number of children: None    Years of education: None    Highest education level: None   Occupational History    None   Tobacco Use    Smoking status: Former Smoker     Packs/day: 1.00     Years: 26.00     Pack years: 26.00     Quit date:      Years since quittin.8    Smokeless tobacco: Never Used   Substance and Sexual Activity    Alcohol use:  Yes     Alcohol/week: 0.0 standard drinks     Comment: occassionally    Drug use: No    Sexual activity: None   Other Topics Concern    None   Social History Narrative    None     Social Determinants of Health     Financial Resource Strain: Low Risk     Difficulty of Paying Living Expenses: Not very hard   Food Insecurity: No Food Insecurity    Worried About Running Out of Food in the Last Year: Never true    Ran Out of Food in the Last Year: Never true   Transportation Needs:     Lack of Transportation (Medical):  Lack of Transportation (Non-Medical):    Physical Activity:     Days of Exercise per Week:     Minutes of Exercise per Session:    Stress:     Feeling of Stress :    Social Connections:     Frequency of Communication with Friends and Family:     Frequency of Social Gatherings with Friends and Family:     Attends Restoration Services:     Active Member of Clubs or Organizations:     Attends Club or Organization Meetings:     Marital Status:    Intimate Partner Violence:     Fear of Current or Ex-Partner:     Emotionally Abused:     Physically Abused:     Sexually Abused: Allergies   Allergen Reactions    Meperidine Other (See Comments)     Upset stomach    Morphine Other (See Comments)     Upset stomach    Nasal Spray     Percocet [Oxycodone-Acetaminophen]      Upset stomach       Review of Systems   Constitutional: Negative for chills and fever. HENT: Negative for facial swelling and nosebleeds. Eyes: Negative for photophobia and visual disturbance. Respiratory: Negative for apnea and cough. Cardiovascular: Negative for chest pain and palpitations. Gastrointestinal: Negative for abdominal distention, abdominal pain and blood in stool. Genitourinary: Negative for enuresis and hematuria. Musculoskeletal: Positive for back pain. Negative for gait problem and joint swelling. Skin: Negative for rash. Neurological: Negative for syncope, speech difficulty and headaches. Hematological: Does not bruise/bleed easily. Psychiatric/Behavioral: Negative for hallucinations and suicidal ideas.            Vitals:    10/18/21 1107   BP: 132/88   Site: Right Upper Arm   Cuff Size: Medium Adult   Pulse: 65   Temp: 97.8 °F (36.6 °C)   SpO2: 99%   Weight: 147 lb (66.7 kg)   Height: 5' 3\" (1.6 m)       Physical Exam  Constitutional:       Appearance: She is well-developed. HENT:      Head: Normocephalic and atraumatic. Eyes:      Conjunctiva/sclera: Conjunctivae normal.      Pupils: Pupils are equal, round, and reactive to light. Neck:      Trachea: No tracheal deviation. Cardiovascular:      Rate and Rhythm: Normal rate and regular rhythm. Heart sounds: Normal heart sounds. Pulmonary:      Effort: Pulmonary effort is normal. No respiratory distress. Breath sounds: Normal breath sounds. Abdominal:      General: There is no distension. Musculoskeletal:         General: Normal range of motion. Cervical back: Normal range of motion. Lumbar back: Deformity and spasms present. Skin:     General: Skin is warm. Coloration: Skin is not jaundiced. Neurological:      Mental Status: She is alert and oriented to person, place, and time. Cranial Nerves: No cranial nerve deficit. Psychiatric:         Mood and Affect: Mood normal.     Assessment/Plan  Mark Palmer was seen today for 3 month follow-up, hypertension, knee injury, back pain and discuss labs. Diagnoses and all orders for this visit:    Essential hypertension    Chronic bilateral low back pain with bilateral sciatica    Current non-smoker  -     CT lung screen [Initial/Annual]; Future  -     TX VISIT TO DISCUSS LUNG CA SCREEN W LDCT  -     CT Lung Screen (Annual); Future    Personal history of tobacco use  -     TX VISIT TO DISCUSS LUNG CA SCREEN W LDCT  -     CT Lung Screen (Annual); Future    Need for pneumococcal vaccination  -     PNEUMOVAX 23 subcutaneous/IM (Pneumococcal polysaccharide vaccine 23-valent >= 3yo)    Fever and chills  -     POCT Influenza A/B        Return in about 3 months (around 1/18/2022), or if symptoms worsen or fail to improve.     Chirag Bedoya MD  Low Dose CT (LDCT) Lung Screening criteria met:     Age 55-77(Medicare) or 50-80 (USPSTF)   Pack year smoking >30 (Medicare) or >20 (USPSTF)   Still smoking or less than 15 year since quit   No sign or symptoms of lung cancer   > 11 months since last LDCT     Risks and benefits of lung cancer screening with LDCT scans discussed:    Significance of positive screen - False-positive LDCT results often occur. 95% of all positive results do not lead to a diagnosis of cancer. Usually further imaging can resolve most false-positive results; however, some patients may require invasive procedures. Over diagnosis risk - 10% to 12% of screen-detected lung cancer cases are over diagnosedthat is, the cancer would not have been detected in the patient's lifetime without the screening. Need for follow up screens annually to continue lung cancer screening effectiveness     Risks associated with radiation from annual LDCT- Radiation exposure is about the same as for a mammogram, which is about 1/3 of the annual background radiation exposure from everyday life. Starting screening at age 54 is not likely to increase cancer risk from radiation exposure. Patients with comorbidities resulting in life expectancy of < 10 years, or that would preclude treatment of an abnormality identified on CT, should not be screened due to lack of benefit.     To obtain maximal benefit from this screening, smoking cessation and long-term abstinence from smoking is critical

## 2021-10-19 DIAGNOSIS — E04.1 HOT THYROID NODULE: ICD-10-CM

## 2021-10-19 LAB
HCT VFR BLD CALC: 41.8 % (ref 37–47)
HEMOGLOBIN: 14.3 G/DL (ref 12–16)
MCH RBC QN AUTO: 30.2 PG (ref 27–31.3)
MCHC RBC AUTO-ENTMCNC: 34.3 % (ref 33–37)
MCV RBC AUTO: 88.2 FL (ref 82–100)
PDW BLD-RTO: 13.2 % (ref 11.5–14.5)
PLATELET # BLD: 293 K/UL (ref 130–400)
RBC # BLD: 4.74 M/UL (ref 4.2–5.4)
T4 FREE: 1 NG/DL (ref 0.84–1.68)
TSH SERPL DL<=0.05 MIU/L-ACNC: 0.27 UIU/ML (ref 0.44–3.86)
WBC # BLD: 9.4 K/UL (ref 4.8–10.8)

## 2021-10-23 ENCOUNTER — OFFICE VISIT (OUTPATIENT)
Dept: ENDOCRINOLOGY | Age: 71
End: 2021-10-23
Payer: MEDICARE

## 2021-10-23 VITALS
BODY MASS INDEX: 26.05 KG/M2 | DIASTOLIC BLOOD PRESSURE: 94 MMHG | HEIGHT: 63 IN | WEIGHT: 147 LBS | OXYGEN SATURATION: 93 % | SYSTOLIC BLOOD PRESSURE: 160 MMHG | HEART RATE: 64 BPM

## 2021-10-23 DIAGNOSIS — E04.1 HOT THYROID NODULE: ICD-10-CM

## 2021-10-23 DIAGNOSIS — E05.90 HYPERTHYROIDISM: Primary | ICD-10-CM

## 2021-10-23 PROCEDURE — 3017F COLORECTAL CA SCREEN DOC REV: CPT | Performed by: INTERNAL MEDICINE

## 2021-10-23 PROCEDURE — 1036F TOBACCO NON-USER: CPT | Performed by: INTERNAL MEDICINE

## 2021-10-23 PROCEDURE — G8427 DOCREV CUR MEDS BY ELIG CLIN: HCPCS | Performed by: INTERNAL MEDICINE

## 2021-10-23 PROCEDURE — 99213 OFFICE O/P EST LOW 20 MIN: CPT | Performed by: INTERNAL MEDICINE

## 2021-10-23 PROCEDURE — 1090F PRES/ABSN URINE INCON ASSESS: CPT | Performed by: INTERNAL MEDICINE

## 2021-10-23 PROCEDURE — G8484 FLU IMMUNIZE NO ADMIN: HCPCS | Performed by: INTERNAL MEDICINE

## 2021-10-23 PROCEDURE — G8399 PT W/DXA RESULTS DOCUMENT: HCPCS | Performed by: INTERNAL MEDICINE

## 2021-10-23 PROCEDURE — G8417 CALC BMI ABV UP PARAM F/U: HCPCS | Performed by: INTERNAL MEDICINE

## 2021-10-23 PROCEDURE — 4040F PNEUMOC VAC/ADMIN/RCVD: CPT | Performed by: INTERNAL MEDICINE

## 2021-10-23 PROCEDURE — 1123F ACP DISCUSS/DSCN MKR DOCD: CPT | Performed by: INTERNAL MEDICINE

## 2021-10-23 RX ORDER — METHIMAZOLE 5 MG/1
TABLET ORAL
Qty: 30 TABLET | Refills: 3 | Status: SHIPPED | OUTPATIENT
Start: 2021-10-23 | End: 2022-02-12 | Stop reason: SDUPTHER

## 2021-10-23 NOTE — PROGRESS NOTES
10/23/2021    Assessment:       Diagnosis Orders   1. Hyperthyroidism     2. Hot thyroid nodule  T4, Free    TSH without Reflex         PLAN:     Orders Placed This Encounter   Procedures    T4, Free     Standing Status:   Future     Standing Expiration Date:   10/23/2022    TSH without Reflex     Standing Status:   Future     Standing Expiration Date:   10/23/2022     Continue Tapazole 2.5 mg 3 times a week follow-up in 3 to 6 months time  Orders Placed This Encounter   Medications    methIMAzole (TAPAZOLE) 5 MG tablet     Si/2 po three   Times a week     Dispense:  30 tablet     Refill:  3         Subjective:     Chief Complaint   Patient presents with    Hyperthyroidism     Vitals:    10/23/21 1137 10/23/21 1139   BP: (!) 163/97 (!) 160/94   Site: Right Upper Arm    Pulse: 64    SpO2: 93%    Weight: 147 lb (66.7 kg)    Height: 5' 3\" (1.6 m)      Wt Readings from Last 3 Encounters:   10/23/21 147 lb (66.7 kg)   10/18/21 146 lb (66.2 kg)   10/18/21 147 lb (66.7 kg)     BP Readings from Last 3 Encounters:   10/23/21 (!) 160/94   10/18/21 132/88   10/18/21 132/88     Follow-up on hyper thyroidism patient on low-dose Tapazole    Other  This is a recurrent problem. The current episode started more than 1 year ago. The problem occurs intermittently. The problem has been gradually improving. Associated symptoms include fatigue. Treatments tried: tapazole  The treatment provided moderate relief.      Past Medical History:   Diagnosis Date    Bronchitis with bronchospasm 2019    Bursitis of both hips     Cancer ([facility] [address]     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    Chronic bilateral low back pain     Chronic midline thoracic back pain     Contusion of right eyelid 2019    Cough 2016    Depression     Dizziness 2015    Dizziness 2015    Gastritis with hemorrhage     GERD (gastroesophageal reflux disease)     Heart palpitations 2020    Hereditary motor and sensory neuropathy     Hypertension     Hypertension     Hyperthyroidism     seen dr Adelina Liz and dr Adriana Hughes Neck pain     Need for pneumococcal vaccination 2019    Osteoarthritis     Seasonal allergies 2016    Smoker     quit 2008, lobectomy left    SOB (shortness of breath) 2015    SOB (shortness of breath) 2015     Past Surgical History:   Procedure Laterality Date    ABDOMEN SURGERY      CATARACT REMOVAL WITH IMPLANT  2018    bilateral  and      SECTION      FINGER REPLANTATION      right thumb implant due to arthritis    HYSTERECTOMY      KNEE ARTHROSCOPY      bilateral    LOBECTOMY  1974    lower back    NOSE SURGERY      sinus    TUBAL LIGATION      TUMOR REMOVAL  1973    2x on back side of neck     Social History     Socioeconomic History    Marital status:      Spouse name: Not on file    Number of children: Not on file    Years of education: Not on file    Highest education level: Not on file   Occupational History    Not on file   Tobacco Use    Smoking status: Former Smoker     Packs/day: 1.00     Years: 26.00     Pack years: 26.00     Quit date:      Years since quittin.8    Smokeless tobacco: Never Used   Substance and Sexual Activity    Alcohol use: Yes     Alcohol/week: 0.0 standard drinks     Comment: occassionally    Drug use: No    Sexual activity: Not on file   Other Topics Concern    Not on file   Social History Narrative    Not on file     Social Determinants of Health     Financial Resource Strain: Low Risk     Difficulty of Paying Living Expenses: Not very hard   Food Insecurity: No Food Insecurity    Worried About Running Out of Food in the Last Year: Never true    Ramos of Food in the Last Year: Never true   Transportation Needs:     Lack of Transportation (Medical):      Lack of Transportation (Non-Medical):    Physical Activity:     Days of Exercise per Week:     Minutes of Exercise per Session:    Stress:     Feeling of Stress :    Social Connections:     Frequency of Communication with Friends and Family:     Frequency of Social Gatherings with Friends and Family:     Attends [employer] Services:     Active Member of Clubs or Organizations:     Attends Club or Organization Meetings:     Marital Status:    Intimate Partner Violence:     Fear of Current or Ex-Partner:     Emotionally Abused:     Physically Abused:     Sexually Abused:      Family History   Adopted: Yes   Problem Relation Age of Onset    Other Mother     Other Sister     Colon Cancer Brother      Allergies   Allergen Reactions    Meperidine Other (See Comments)     Upset stomach    Morphine Other (See Comments)     Upset stomach    Nasal Spray     Percocet [Oxycodone-Acetaminophen]      Upset stomach       Current Outpatient Medications:     traMADol (ULTRAM) 50 MG tablet, , Disp: , Rfl:     DULoxetine (CYMBALTA) 30 MG extended release capsule, , Disp: , Rfl:     lidocaine (LIDODERM) 5 %, , Disp: , Rfl:     montelukast (SINGULAIR) 10 MG tablet, TAKE 1 TABLET BY MOUTH AT  NIGHT, Disp: 90 tablet, Rfl: 3    methIMAzole (TAPAZOLE) 5 MG tablet, 1/2 po three   Times a week, Disp: 30 tablet, Rfl: 3    losartan-hydroCHLOROthiazide (HYZAAR) 100-12.5 MG per tablet, TAKE 1 TABLET BY MOUTH  DAILY, Disp: 90 tablet, Rfl: 3    propranolol (INDERAL LA) 60 MG extended release capsule, TAKE ONE CAPSULE BY MOUTH EVERY DAY, Disp: 90 capsule, Rfl: 1    folic acid (FOLVITE) 1 MG tablet, Take 1 tablet by mouth daily, Disp: 90 tablet, Rfl: 3    PARoxetine (PAXIL) 20 MG tablet, Take 1 tablet by mouth nightly, Disp: 90 tablet, Rfl: 3    Cholecalciferol (VITAMIN D) 50 MCG (2000 UT) TABS tablet, Take 1 tablet by mouth daily, Disp: 90 tablet, Rfl: 1    loratadine (CLARITIN) 10 MG tablet, Take 1 tablet by mouth nightly, Disp: 90 tablet, Rfl: 1    esomeprazole (NEXIUM) 20 MG delayed release capsule, Take 1 capsule by mouth every morning (before breakfast), Disp: 90 capsule, Rfl: 1    diclofenac (VOLTAREN) 50 MG EC tablet, Take 1 tablet by mouth 2 times daily, Disp: 60 tablet, Rfl: 0    diclofenac sodium 1 % GEL, , Disp: , Rfl:   Lab Results   Component Value Date     07/19/2021    K 4.3 07/19/2021     07/19/2021    CO2 26 07/19/2021    BUN 25 (H) 07/19/2021    CREATININE 0.67 07/19/2021    GLUCOSE 76 07/19/2021    CALCIUM 10.5 (H) 07/19/2021    PROT 6.1 (L) 07/19/2021    LABALBU 4.2 07/19/2021    BILITOT 0.4 07/19/2021    ALKPHOS 110 07/19/2021    AST 14 07/19/2021    ALT 10 07/19/2021    LABGLOM >60.0 07/19/2021    GFRAA >60.0 07/19/2021    GLOB 1.9 (L) 07/19/2021     Lab Results   Component Value Date    WBC 9.4 10/19/2021    HGB 14.3 10/19/2021    HCT 41.8 10/19/2021    MCV 88.2 10/19/2021     10/19/2021     Lab Results   Component Value Date    LABA1C 4.7 (L) 07/19/2021    LABA1C 4.8 07/31/2019     Lab Results   Component Value Date    CHOLFAST 200 (H) 07/19/2021    TRIGLYCFAST 223 (H) 07/19/2021    HDL 41 07/19/2021    HDL 47 07/31/2019    HDL 44 07/07/2015    LDLCALC 114 07/19/2021    LDLCALC 118 07/31/2019    LDLCALC 103 07/07/2015    CHOL 197 07/31/2019    CHOL 177 07/07/2015    TRIG 159 (H) 07/31/2019    TRIG 187 07/07/2015     No results found for: TESTM  Lab Results   Component Value Date    TSH 0.268 (L) 10/19/2021    TSH 0.028 (L) 08/19/2021    TSH 0.012 (L) 06/14/2021    TSHREFLEX 0.037 (L) 05/06/2019    T4FREE 1.00 10/19/2021    T4FREE 1.19 08/19/2021    T4FREE 1.21 06/14/2021     Lab Results   Component Value Date    TPOABS <10.0 03/12/2020       Review of Systems   Constitutional: Positive for fatigue. Cardiovascular: Negative. Endocrine: Negative. All other systems reviewed and are negative. Objective:   Physical Exam  Vitals reviewed. Constitutional:       Appearance: Normal appearance. She is normal weight. HENT:      Head: Normocephalic and atraumatic.  Hair is normal.      Right Ear: External ear normal. Left Ear: External ear normal.      Nose: Nose normal.   Eyes:      General: No scleral icterus. Right eye: No discharge. Left eye: No discharge. Extraocular Movements: Extraocular movements intact. Conjunctiva/sclera: Conjunctivae normal.   Neck:      Trachea: Trachea normal.   Cardiovascular:      Rate and Rhythm: Normal rate. Pulmonary:      Effort: Pulmonary effort is normal.   Musculoskeletal:         General: Normal range of motion. Cervical back: Normal range of motion and neck supple. Neurological:      General: No focal deficit present. Mental Status: She is alert and oriented to person, place, and time.    Psychiatric:         Mood and Affect: Mood normal.         Behavior: Behavior normal.

## 2021-10-28 ASSESSMENT — ENCOUNTER SYMPTOMS
CHOKING: 0
BLOOD IN STOOL: 0
FACIAL SWELLING: 0
ABDOMINAL DISTENTION: 0
APNEA: 0
PHOTOPHOBIA: 0

## 2021-10-28 NOTE — PATIENT INSTRUCTIONS
Personalized Preventive Plan for Creteofilo Christian - 10/18/2021  Medicare offers a range of preventive health benefits. Some of the tests and screenings are paid in full while other may be subject to a deductible, co-insurance, and/or copay. Some of these benefits include a comprehensive review of your medical history including lifestyle, illnesses that may run in your family, and various assessments and screenings as appropriate. After reviewing your medical record and screening and assessments performed today your provider may have ordered immunizations, labs, imaging, and/or referrals for you. A list of these orders (if applicable) as well as your Preventive Care list are included within your After Visit Summary for your review. Other Preventive Recommendations:    · A preventive eye exam performed by an eye specialist is recommended every 1-2 years to screen for glaucoma; cataracts, macular degeneration, and other eye disorders. · A preventive dental visit is recommended every 6 months. · Try to get at least 150 minutes of exercise per week or 10,000 steps per day on a pedometer . · Order or download the FREE \"Exercise & Physical Activity: Your Everyday Guide\" from [employer]. Call 4-700.192.6631 or search [employer] online. · You need 1726-6096 mg of calcium and 4131-6549 IU of vitamin D per day. It is possible to meet your calcium requirement with diet alone, but a vitamin D supplement is usually necessary to meet this goal.  · When exposed to the sun, use a sunscreen that protects against both UVA and UVB radiation with an SPF of 30 or greater. Reapply every 2 to 3 hours or after sweating, drying off with a towel, or swimming. · Always wear a seat belt when traveling in a car. Always wear a helmet when riding a bicycle or motorcycle.

## 2021-10-28 NOTE — PROGRESS NOTES
[facility] 66 [facility]   Chief Complaint   Patient presents with    Medicare AWV       HPIannual wellness visit    Past Medical History:   Diagnosis Date    Bronchitis with bronchospasm 2019    Bursitis of both hips     Cancer ([facility] [address]     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    Chronic bilateral low back pain     Chronic midline thoracic back pain     Contusion of right eyelid 2019    Cough 2016    Depression     Dizziness 2015    Dizziness 2015    Gastritis with hemorrhage     GERD (gastroesophageal reflux disease)     Heart palpitations 2020    Hereditary motor and sensory neuropathy     Hypertension     Hypertension     Hyperthyroidism     seen dr Boyd July and dr Stevan Camacho Neck pain     Need for pneumococcal vaccination 2019    Osteoarthritis     Seasonal allergies 2016    Smoker     quit , lobectomy left    SOB (shortness of breath) 2015    SOB (shortness of breath) 2015     Patient Active Problem List    Diagnosis Date Noted    Palpitations 2020    Hyponatremia 2019    Hypercalcemia 2019    Hypernatremia 2019    Muscle pain 2019    Ataxia 2019    Recurrent falls 2019    Bronchitis 2019    At high risk for falls 2019    Goiter, toxic, multinodular 2018    Thyroid nodule 10/25/2018    Bilateral inguinal hernia 2017    Seasonal allergies 2016    Depressive disorder 2013    Diverticulosis of large intestine 2013    Gastroesophageal reflux disease 2013    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    Hypertensive disorder 2013     Past Surgical History:   Procedure Laterality Date    ABDOMEN SURGERY      CATARACT REMOVAL WITH IMPLANT  2018    bilateral  and      SECTION      FINGER REPLANTATION      right thumb implant due to arthritis    HYSTERECTOMY      KNEE ARTHROSCOPY bilateral    LOBECTOMY  1974    lower back    NOSE SURGERY      sinus    TUBAL LIGATION      TUMOR REMOVAL  1973    2x on back side of neck     Family History   Adopted: Yes   Problem Relation Age of Onset    Other Mother     Other Sister     Colon Cancer Brother      Social History     Socioeconomic History    Marital status:      Spouse name: None    Number of children: None    Years of education: None    Highest education level: None   Occupational History    None   Tobacco Use    Smoking status: Former Smoker     Packs/day: 1.00     Years: 26.00     Pack years: 26.00     Quit date:      Years since quittin.8    Smokeless tobacco: Never Used   Substance and Sexual Activity    Alcohol use: Yes     Alcohol/week: 0.0 standard drinks     Comment: occassionally    Drug use: No    Sexual activity: None   Other Topics Concern    None   Social History Narrative    None     Social Determinants of Health     Financial Resource Strain: Low Risk     Difficulty of Paying Living Expenses: Not very hard   Food Insecurity: No Food Insecurity    Worried About Running Out of Food in the Last Year: Never true    Ramos of Food in the Last Year: Never true   Transportation Needs:     Lack of Transportation (Medical):  Lack of Transportation (Non-Medical):    Physical Activity:     Days of Exercise per Week:     Minutes of Exercise per Session:    Stress:     Feeling of Stress :    Social Connections:     Frequency of Communication with Friends and Family:     Frequency of Social Gatherings with Friends and Family:     Attends [employer] Services:     Active Member of Clubs or Organizations:     Attends Club or Organization Meetings:     Marital Status:    Intimate Partner Violence:     Fear of Current or Ex-Partner:     Emotionally Abused:     Physically Abused:     Sexually Abused:       Allergies   Allergen Reactions    Meperidine Other (See Comments)     Upset stomach    Morphine Other (See Comments)     Upset stomach    Nasal Spray     Percocet [Oxycodone-Acetaminophen]      Upset stomach       Review of Systems   Constitutional: Negative for fever. HENT: Negative for facial swelling and nosebleeds. Eyes: Negative for photophobia and visual disturbance. Respiratory: Negative for apnea and choking. Cardiovascular: Negative for chest pain and palpitations. Gastrointestinal: Negative for abdominal distention and blood in stool. Genitourinary: Negative for enuresis and hematuria. Musculoskeletal: Negative for gait problem and joint swelling. Skin: Negative for rash. Neurological: Negative for syncope and speech difficulty. Hematological: Does not bruise/bleed easily. Psychiatric/Behavioral: Negative for hallucinations and suicidal ideas. Vitals:    10/18/21 1107   BP: 132/88   Site: Right Upper Arm   Cuff Size: Medium Adult   Pulse: 65   Temp: 97.8 °F (36.6 °C)   SpO2: 99%   Weight: 146 lb (66.2 kg)   Height: 5' 3\" (1.6 m)       Physical Exam  Constitutional:       Appearance: She is well-developed. HENT:      Head: Normocephalic. Eyes:      Pupils: Pupils are equal, round, and reactive to light. Cardiovascular:      Rate and Rhythm: Normal rate and regular rhythm. Heart sounds: Normal heart sounds. Pulmonary:      Effort: No respiratory distress. Breath sounds: Normal breath sounds. No wheezing or rales. Abdominal:      General: There is no distension. Musculoskeletal:         General: Normal range of motion. Cervical back: Normal range of motion. Neurological:      Mental Status: She is alert and oriented to person, place, and time. Assessment/Plan  Lisa Rosario was seen today for medicare awv. Diagnoses and all orders for this visit:    Routine general medical examination at a health care facility        Return in 1 year (on 10/18/2022) for Medicare Annual Wellness Visit in 1 year.     Davon Chacon MD    Medicare Annual Wellness Visit  Name: Jax Diaz Date: 10/28/2021   MRN: [id number] Sex: Female   Age: 66 y.o. Ethnicity: Non- / Non    : 1950 Race: White (non-)      Laurence Becerra is here for Medicare AWV    Screenings for behavioral, psychosocial and functional/safety risks, and cognitive dysfunction are all negative except as indicated below. These results, as well as other patient data from the [address] form, are documented in Flowsheets linked to this Encounter. Allergies   Allergen Reactions    Meperidine Other (See Comments)     Upset stomach    Morphine Other (See Comments)     Upset stomach    Nasal Spray     Percocet [Oxycodone-Acetaminophen]      Upset stomach         Prior to Visit Medications    Medication Sig Taking?  Authorizing Provider   traMADol (ULTRAM) 50 MG tablet  Yes Historical Provider, MD   DULoxetine (CYMBALTA) 30 MG extended release capsule  Yes Historical Provider, MD   lidocaine (LIDODERM) 5 %  Yes Historical Provider, MD   montelukast (SINGULAIR) 10 MG tablet TAKE 1 TABLET BY MOUTH AT  NIGHT Yes Shaquille Márquez MD   losartan-hydroCHLOROthiazide (HYZAAR) 100-12.5 MG per tablet TAKE 1 TABLET BY MOUTH  DAILY Yes Shaquille Márquez MD   propranolol (INDERAL LA) 60 MG extended release capsule TAKE ONE CAPSULE BY MOUTH EVERY DAY Yes Shaquille Márquez MD   folic acid (FOLVITE) 1 MG tablet Take 1 tablet by mouth daily Yes Cholo Ocampo MD   PARoxetine (PAXIL) 20 MG tablet Take 1 tablet by mouth nightly Yes Cholo Ocampo MD   Cholecalciferol (VITAMIN D) 50 MCG ( UT) TABS tablet Take 1 tablet by mouth daily Yes INO Rodas CNP   loratadine (CLARITIN) 10 MG tablet Take 1 tablet by mouth nightly Yes INO Rodas CNP   esomeprazole (NEXIUM) 20 MG delayed release capsule Take 1 capsule by mouth every morning (before breakfast) Yes INO Rodas CNP   diclofenac (VOLTAREN) 50 MG EC tablet Take 1 tablet by mouth 2 Virus Vaccine 10/24/2019, 09/16/2020, 10/11/2021    Influenza, MDCK Quadv, IM, PF (Flucelvax 2 yrs and older) 10/05/2019    Influenza, Quadv, adjuvanted, 65 yrs +, IM, PF (Fluad) 10/16/2020, 10/02/2021    Influenza, Triv, inactivated, subunit, adjuvanted, IM (Fluad 65 yrs and older) 09/25/2018    Pneumococcal Conjugate 13-valent ([id number]) 06/05/2019    Pneumococcal Polysaccharide ([id number]) 10/18/2021        Health Maintenance   Topic Date Due    Annual Wellness Visit (AWV)  09/09/2021    DTaP/Tdap/Td vaccine (1 - Tdap) 02/22/2022 (Originally 3/12/1969)    Shingles Vaccine (1 of 2) 02/22/2022 (Originally 3/12/1998)    Colon cancer screen fecal DNA test (Cologuard)  06/03/2022    Potassium monitoring  07/19/2022    Creatinine monitoring  07/19/2022    Breast cancer screen  10/13/2022    Lipid screen  07/19/2026    Flu vaccine  Completed    Pneumococcal 65+ years Vaccine  Completed    COVID-19 Vaccine  Completed    Hepatitis C screen  Completed    DEXA (modify frequency per FRAX score)  Addressed    Hepatitis A vaccine  Aged Out    Hepatitis B vaccine  Aged Out    Hib vaccine  Aged Out    Meningococcal (ACWY) vaccine  Aged Out     Recommendations for [employer] Due: see orders and patient instructions/AVS.  . Recommended screening schedule for the next 5-10 years is provided to the patient in written form: see Patient Janie Bryant was seen today for medicare awv.     Diagnoses and all orders for this visit:    Routine general medical examination at a health care facility

## 2021-12-20 RX ORDER — PROPRANOLOL HCL 60 MG
CAPSULE, EXTENDED RELEASE 24HR ORAL
Qty: 90 CAPSULE | Refills: 3 | Status: SHIPPED | OUTPATIENT
Start: 2021-12-20 | End: 2022-10-03 | Stop reason: ALTCHOICE

## 2021-12-20 NOTE — TELEPHONE ENCOUNTER
Requesting medication refill. Please approve or deny this request.    Rx requested:  Requested Prescriptions     Pending Prescriptions Disp Refills    propranolol (INDERAL LA) 60 MG extended release capsule [Pharmacy Med Name: PROPRANOLOL  60MG  CAP  EXTENDED RELEASE] 90 capsule 3     Sig: TAKE 1 CAPSULE BY MOUTH  DAILY         Last Office Visit:   8/19/2021      Next Visit Date:  Future Appointments   Date Time Provider Roxy Uribei   1/22/2022 11:30 AM Teresa Sauceda MD [place] [id number]   2/17/2022 10:30 AM Prem Contreras, [address]   4/18/2022 11:00 AM Kaity Loza MD [facility]               Last refill 05/27/2021. Please approve or deny.

## 2022-01-19 DIAGNOSIS — E04.1 HOT THYROID NODULE: ICD-10-CM

## 2022-01-19 LAB
T4 FREE: 1.41 NG/DL (ref 0.84–1.68)
TSH SERPL DL<=0.05 MIU/L-ACNC: 0.04 UIU/ML (ref 0.44–3.86)

## 2022-02-12 ENCOUNTER — OFFICE VISIT (OUTPATIENT)
Dept: ENDOCRINOLOGY | Age: 72
End: 2022-02-12
Payer: MEDICARE

## 2022-02-12 VITALS
WEIGHT: 148 LBS | HEIGHT: 63 IN | BODY MASS INDEX: 26.22 KG/M2 | HEART RATE: 64 BPM | DIASTOLIC BLOOD PRESSURE: 66 MMHG | SYSTOLIC BLOOD PRESSURE: 138 MMHG

## 2022-02-12 DIAGNOSIS — E05.90 HYPERTHYROIDISM: Primary | ICD-10-CM

## 2022-02-12 PROCEDURE — 1090F PRES/ABSN URINE INCON ASSESS: CPT | Performed by: INTERNAL MEDICINE

## 2022-02-12 PROCEDURE — G8417 CALC BMI ABV UP PARAM F/U: HCPCS | Performed by: INTERNAL MEDICINE

## 2022-02-12 PROCEDURE — 1123F ACP DISCUSS/DSCN MKR DOCD: CPT | Performed by: INTERNAL MEDICINE

## 2022-02-12 PROCEDURE — 4040F PNEUMOC VAC/ADMIN/RCVD: CPT | Performed by: INTERNAL MEDICINE

## 2022-02-12 PROCEDURE — G8399 PT W/DXA RESULTS DOCUMENT: HCPCS | Performed by: INTERNAL MEDICINE

## 2022-02-12 PROCEDURE — 1036F TOBACCO NON-USER: CPT | Performed by: INTERNAL MEDICINE

## 2022-02-12 PROCEDURE — 3017F COLORECTAL CA SCREEN DOC REV: CPT | Performed by: INTERNAL MEDICINE

## 2022-02-12 PROCEDURE — 99213 OFFICE O/P EST LOW 20 MIN: CPT | Performed by: INTERNAL MEDICINE

## 2022-02-12 PROCEDURE — G8428 CUR MEDS NOT DOCUMENT: HCPCS | Performed by: INTERNAL MEDICINE

## 2022-02-12 PROCEDURE — G8484 FLU IMMUNIZE NO ADMIN: HCPCS | Performed by: INTERNAL MEDICINE

## 2022-02-12 RX ORDER — METHIMAZOLE 5 MG/1
TABLET ORAL
Qty: 30 TABLET | Refills: 3 | Status: SHIPPED | OUTPATIENT
Start: 2022-02-12

## 2022-02-12 NOTE — PROGRESS NOTES
2022    Assessment:       Diagnosis Orders   1. Hyperthyroidism           PLAN:     Orders Placed This Encounter   Procedures    TSH without Reflex     Standing Status:   Future     Standing Expiration Date:   2023    T4, Free     Standing Status:   Future     Standing Expiration Date:   2023    CBC     Standing Status:   Future     Standing Expiration Date:   2023     Change Tapazole to 2.5 mg 4 days a week repeat labs in 3 months  Orders Placed This Encounter   Medications    methIMAzole (TAPAZOLE) 5 MG tablet     Si/2 po 4 days   a week     Dispense:  30 tablet     Refill:  3       Subjective:     Chief Complaint   Patient presents with    Hyperthyroidism    Goiter     Vitals:    22 1214   BP: 138/66   Site: Left Upper Arm   Position: Sitting   Cuff Size: Medium Adult   Pulse: 64   Weight: 148 lb (67.1 kg)   Height: 5' 3\" (1.6 m)     Wt Readings from Last 3 Encounters:   22 148 lb (67.1 kg)   10/23/21 147 lb (66.7 kg)   10/18/21 146 lb (66.2 kg)     BP Readings from Last 3 Encounters:   22 138/66   10/23/21 (!) 160/94   10/18/21 132/88     Follow-up on hyper thyroidism patient on Tapazole 2.5  3 days a week   normal labs show increased free T4 TSH was suppressed symptoms otherwise have been stable other than some fatigue    Other  This is a chronic problem. The current episode started more than 1 year ago. The problem occurs intermittently. The problem has been waxing and waning. Associated symptoms include fatigue. Treatments tried: Tapazole. The treatment provided moderate relief. Results for Jignesh Sierra" (MRN [id number]) as of 2022 12:23   Ref.  Range 2021 11:42 10/19/2021 09:49 2022 13:35   TSH Latest Ref Range: 0.440 - 3.860 uIU/mL 0.028 (L) 0.268 (L) 0.038 (L)   T4 Free Latest Ref Range: 0.84 - 1.68 ng/dL 1.19 1.00 1.41   WBC Latest Ref Range: 4.8 - 10.8 K/uL 7.9 9.4    RBC Latest Ref Range: 4.20 - 5.40 M/uL 4.94 4.74 Hemoglobin Quant Latest Ref Range: 12.0 - 16.0 g/dL 14.8 14.3    Hematocrit Latest Ref Range: 37.0 - 47.0 % 43.6 41.8    MCV Latest Ref Range: 82.0 - 100.0 fL 88.2 88.2    MCH Latest Ref Range: 27.0 - 31.3 pg 30.0 30.2    MCHC Latest Ref Range: 33.0 - 37.0 % 34.0 34.3    RDW Latest Ref Range: 11.5 - 14.5 % 14.0 13.2    Platelet Count Latest Ref Range: 130 - 400 K/uL 288 293        Past Medical History:   Diagnosis Date    Bronchitis with bronchospasm 2019    Bursitis of both hips     Cancer ([facility] [address]     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    Chronic bilateral low back pain     Chronic midline thoracic back pain     Contusion of right eyelid 2019    Cough 2016    Depression     Dizziness 2015    Dizziness 2015    Gastritis with hemorrhage     GERD (gastroesophageal reflux disease)     Heart palpitations 2020    Hereditary motor and sensory neuropathy     Hypertension     Hypertension     Hyperthyroidism     seen dr Alfa Morales and dr Kyle Mccray Neck pain     Need for pneumococcal vaccination 2019    Osteoarthritis     Seasonal allergies 2016    Smoker     quit , lobectomy left    SOB (shortness of breath) 2015    SOB (shortness of breath) 2015     Past Surgical History:   Procedure Laterality Date    ABDOMEN SURGERY      CATARACT REMOVAL WITH IMPLANT  2018    bilateral  and      SECTION      FINGER REPLANTATION      right thumb implant due to arthritis    HYSTERECTOMY  1993    KNEE ARTHROSCOPY      bilateral    LOBECTOMY  1974    lower back    NOSE SURGERY      sinus    TUBAL LIGATION      TUMOR REMOVAL  1973    2x on back side of neck     Social History     Socioeconomic History    Marital status:      Spouse name: Not on file    Number of children: Not on file    Years of education: Not on file    Highest education level: Not on file   Occupational History    Not on file   Tobacco Use    Smoking status: Former Smoker     Packs/day: 1.00     Years: 26.00     Pack years: 26.00     Quit date:      Years since quittin.1    Smokeless tobacco: Never Used   Substance and Sexual Activity    Alcohol use: Yes     Alcohol/week: 0.0 standard drinks     Comment: occassionally    Drug use: No    Sexual activity: Not on file   Other Topics Concern    Not on file   Social History Narrative    Not on file     Social Determinants of Health     Financial Resource Strain: Low Risk     Difficulty of Paying Living Expenses: Not very hard   Food Insecurity: No Food Insecurity    Worried About Running Out of Food in the Last Year: Never true    Ramos of Food in the Last Year: Never true   Transportation Needs:     Lack of Transportation (Medical): Not on file    Lack of Transportation (Non-Medical):  Not on file   Physical Activity:     Days of Exercise per Week: Not on file    Minutes of Exercise per Session: Not on file   Stress:     Feeling of Stress : Not on file   Social Connections:     Frequency of Communication with Friends and Family: Not on file    Frequency of Social Gatherings with Friends and Family: Not on file    Attends Sabianist Services: Not on file    Active Member of [address] [place] [address] [employer] or Organizations: Not on file    Attends Club or Organization Meetings: Not on file    Marital Status: Not on file   Intimate Partner Violence:     Fear of Current or Ex-Partner: Not on file    Emotionally Abused: Not on file    Physically Abused: Not on file    Sexually Abused: Not on file   Housing Stability:     Unable to Pay for Housing in the Last Year: Not on file    Number of [place] in the Last Year: Not on file    Unstable Housing in the Last Year: Not on file     Family History   Adopted: Yes   Problem Relation Age of Onset    Other Mother     Other Sister     Colon Cancer Brother      Allergies   Allergen Reactions    Meperidine Other (See Comments)     Upset stomach    Morphine Other (See Comments)     Upset stomach    Nasal Spray     Percocet [Oxycodone-Acetaminophen]      Upset stomach       Current Outpatient Medications:     folic acid (FOLVITE) 1 MG tablet, TAKE 1 TABLET BY MOUTH  DAILY, Disp: 90 tablet, Rfl: 2    propranolol (INDERAL LA) 60 MG extended release capsule, TAKE 1 CAPSULE BY MOUTH  DAILY, Disp: 90 capsule, Rfl: 3    methIMAzole (TAPAZOLE) 5 MG tablet, 1/2 po three   Times a week, Disp: 30 tablet, Rfl: 3    traMADol (ULTRAM) 50 MG tablet, , Disp: , Rfl:     DULoxetine (CYMBALTA) 30 MG extended release capsule, , Disp: , Rfl:     lidocaine (LIDODERM) 5 %, , Disp: , Rfl:     montelukast (SINGULAIR) 10 MG tablet, TAKE 1 TABLET BY MOUTH AT  NIGHT, Disp: 90 tablet, Rfl: 3    losartan-hydroCHLOROthiazide (HYZAAR) 100-12.5 MG per tablet, TAKE 1 TABLET BY MOUTH  DAILY, Disp: 90 tablet, Rfl: 3    PARoxetine (PAXIL) 20 MG tablet, Take 1 tablet by mouth nightly, Disp: 90 tablet, Rfl: 3    Cholecalciferol (VITAMIN D) 50 MCG (2000 UT) TABS tablet, Take 1 tablet by mouth daily, Disp: 90 tablet, Rfl: 1    loratadine (CLARITIN) 10 MG tablet, Take 1 tablet by mouth nightly, Disp: 90 tablet, Rfl: 1    esomeprazole (NEXIUM) 20 MG delayed release capsule, Take 1 capsule by mouth every morning (before breakfast), Disp: 90 capsule, Rfl: 1    diclofenac (VOLTAREN) 50 MG EC tablet, Take 1 tablet by mouth 2 times daily, Disp: 60 tablet, Rfl: 0    diclofenac sodium 1 % GEL, , Disp: , Rfl:   Lab Results   Component Value Date     07/19/2021    K 4.3 07/19/2021     07/19/2021    CO2 26 07/19/2021    BUN 25 (H) 07/19/2021    CREATININE 0.67 07/19/2021    GLUCOSE 76 07/19/2021    CALCIUM 10.5 (H) 07/19/2021    PROT 6.1 (L) 07/19/2021    LABALBU 4.2 07/19/2021    BILITOT 0.4 07/19/2021    ALKPHOS 110 07/19/2021    AST 14 07/19/2021    ALT 10 07/19/2021    LABGLOM >60.0 07/19/2021    GFRAA >60.0 07/19/2021    GLOB 1.9 (L) 07/19/2021     Lab Results   Component Value Date    WBC 9.4 10/19/2021    HGB 14.3 10/19/2021    HCT 41.8 10/19/2021    MCV 88.2 10/19/2021     10/19/2021     Lab Results   Component Value Date    LABA1C 4.7 (L) 07/19/2021    LABA1C 4.8 07/31/2019     Lab Results   Component Value Date    CHOLFAST 200 (H) 07/19/2021    TRIGLYCFAST 223 (H) 07/19/2021    HDL 41 07/19/2021    HDL 47 07/31/2019    HDL 44 07/07/2015    LDLCALC 114 07/19/2021    LDLCALC 118 07/31/2019    LDLCALC 103 07/07/2015    CHOL 197 07/31/2019    CHOL 177 07/07/2015    TRIG 159 (H) 07/31/2019    TRIG 187 07/07/2015     No results found for: TESTM  Lab Results   Component Value Date    TSH 0.038 (L) 01/19/2022    TSH 0.268 (L) 10/19/2021    TSH 0.028 (L) 08/19/2021    TSHREFLEX 0.037 (L) 05/06/2019    T4FREE 1.41 01/19/2022    T4FREE 1.00 10/19/2021    T4FREE 1.19 08/19/2021     Lab Results   Component Value Date    TPOABS <10.0 03/12/2020       Review of Systems   Constitutional: Positive for fatigue. Cardiovascular: Negative. Endocrine: Negative. All other systems reviewed and are negative. Objective:   Physical Exam  Vitals reviewed. Constitutional:       Appearance: Normal appearance. HENT:      Head: Normocephalic and atraumatic. Hair is normal.      Right Ear: External ear normal.      Left Ear: External ear normal.      Nose: Nose normal.   Eyes:      General: No scleral icterus. Right eye: No discharge. Left eye: No discharge. Extraocular Movements: Extraocular movements intact. Conjunctiva/sclera: Conjunctivae normal.   Neck:      Trachea: Trachea normal.   Cardiovascular:      Rate and Rhythm: Normal rate. Pulmonary:      Effort: Pulmonary effort is normal.   Musculoskeletal:         General: Normal range of motion. Cervical back: Normal range of motion and neck supple. Neurological:      General: No focal deficit present. Mental Status: She is alert and oriented to person, place, and time.    Psychiatric:         Mood and Affect: Mood normal.         Behavior: Behavior normal.

## 2022-04-18 ENCOUNTER — OFFICE VISIT (OUTPATIENT)
Dept: PRIMARY CARE CLINIC | Age: 72
End: 2022-04-18
Payer: MEDICARE

## 2022-04-18 DIAGNOSIS — C49.9 ANGIOSARCOMA (HCC): ICD-10-CM

## 2022-04-18 DIAGNOSIS — R00.2 PALPITATIONS: ICD-10-CM

## 2022-04-18 DIAGNOSIS — I10 ESSENTIAL HYPERTENSION: Primary | ICD-10-CM

## 2022-04-18 PROCEDURE — G8399 PT W/DXA RESULTS DOCUMENT: HCPCS | Performed by: INTERNAL MEDICINE

## 2022-04-18 PROCEDURE — 99212 OFFICE O/P EST SF 10 MIN: CPT | Performed by: INTERNAL MEDICINE

## 2022-04-18 PROCEDURE — G8417 CALC BMI ABV UP PARAM F/U: HCPCS | Performed by: INTERNAL MEDICINE

## 2022-04-18 PROCEDURE — G8427 DOCREV CUR MEDS BY ELIG CLIN: HCPCS | Performed by: INTERNAL MEDICINE

## 2022-04-18 PROCEDURE — 1123F ACP DISCUSS/DSCN MKR DOCD: CPT | Performed by: INTERNAL MEDICINE

## 2022-04-18 PROCEDURE — 1036F TOBACCO NON-USER: CPT | Performed by: INTERNAL MEDICINE

## 2022-04-18 PROCEDURE — 4040F PNEUMOC VAC/ADMIN/RCVD: CPT | Performed by: INTERNAL MEDICINE

## 2022-04-18 PROCEDURE — 3017F COLORECTAL CA SCREEN DOC REV: CPT | Performed by: INTERNAL MEDICINE

## 2022-04-18 PROCEDURE — 1090F PRES/ABSN URINE INCON ASSESS: CPT | Performed by: INTERNAL MEDICINE

## 2022-04-18 RX ORDER — PROPRANOLOL HYDROCHLORIDE 80 MG/1
CAPSULE, EXTENDED RELEASE ORAL
COMMUNITY
Start: 2022-03-24

## 2022-04-18 ASSESSMENT — PATIENT HEALTH QUESTIONNAIRE - PHQ9
4. FEELING TIRED OR HAVING LITTLE ENERGY: 0
SUM OF ALL RESPONSES TO PHQ QUESTIONS 1-9: 0
SUM OF ALL RESPONSES TO PHQ9 QUESTIONS 1 & 2: 0
5. POOR APPETITE OR OVEREATING: 0
7. TROUBLE CONCENTRATING ON THINGS, SUCH AS READING THE NEWSPAPER OR WATCHING TELEVISION: 0
2. FEELING DOWN, DEPRESSED OR HOPELESS: 0
8. MOVING OR SPEAKING SO SLOWLY THAT OTHER PEOPLE COULD HAVE NOTICED. OR THE OPPOSITE, BEING SO FIGETY OR RESTLESS THAT YOU HAVE BEEN MOVING AROUND A LOT MORE THAN USUAL: 0
6. FEELING BAD ABOUT YOURSELF - OR THAT YOU ARE A FAILURE OR HAVE LET YOURSELF OR YOUR FAMILY DOWN: 0
10. IF YOU CHECKED OFF ANY PROBLEMS, HOW DIFFICULT HAVE THESE PROBLEMS MADE IT FOR YOU TO DO YOUR WORK, TAKE CARE OF THINGS AT HOME, OR GET ALONG WITH OTHER PEOPLE: 0
SUM OF ALL RESPONSES TO PHQ QUESTIONS 1-9: 0
SUM OF ALL RESPONSES TO PHQ QUESTIONS 1-9: 0
1. LITTLE INTEREST OR PLEASURE IN DOING THINGS: 0
SUM OF ALL RESPONSES TO PHQ QUESTIONS 1-9: 0
3. TROUBLE FALLING OR STAYING ASLEEP: 0
9. THOUGHTS THAT YOU WOULD BE BETTER OFF DEAD, OR OF HURTING YOURSELF: 0

## 2022-04-18 ASSESSMENT — ENCOUNTER SYMPTOMS
ABDOMINAL DISTENTION: 0
BLOOD IN STOOL: 0
FACIAL SWELLING: 0
APNEA: 0
PHOTOPHOBIA: 0
CHOKING: 0

## 2022-04-18 NOTE — PROGRESS NOTES
Jeri Shankar 74 y.o. female presents today with   Chief Complaint   Patient presents with    6 Month Follow-Up    Hypertension       Hypertension  This is a chronic problem. The current episode started more than 1 year ago. The problem is unchanged. The problem is controlled. Associated symptoms include anxiety and palpitations. Pertinent negatives include no chest pain. Palpitations   This is a recurrent (cardiology up the med. better.) problem. The current episode started more than 1 year ago. The problem occurs intermittently. The problem has been waxing and waning. Pertinent negatives include no chest pain or fever.        Past Medical History:   Diagnosis Date    Bronchitis with bronchospasm 05/24/2019    Bursitis of both hips     Cancer ([address]     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    Chronic bilateral low back pain     Chronic midline thoracic back pain     Contusion of right eyelid 06/05/2019    Cough 11/17/2016    Depression     Dizziness 06/18/2015    Dizziness 06/18/2015    Gastritis with hemorrhage     GERD (gastroesophageal reflux disease)     Heart palpitations 03/12/2020    Hereditary motor and sensory neuropathy 2019    Hypertension     Hypertension     Hyperthyroidism     seen dr Hebert Kennedy and dr Savana Sanchez Neck pain     Need for pneumococcal vaccination 06/05/2019    Osteoarthritis     Seasonal allergies 11/03/2016    Smoker     quit 2008, lobectomy left    SOB (shortness of breath) 06/18/2015    SOB (shortness of breath) 06/18/2015     Patient Active Problem List    Diagnosis Date Noted    Palpitations 03/12/2020    Hyponatremia 09/07/2019    Hypercalcemia 09/07/2019    Hypernatremia 09/07/2019    Muscle pain 09/07/2019    Ataxia 06/05/2019    Recurrent falls 06/05/2019    Bronchitis 05/24/2019    At high risk for falls 05/24/2019    Goiter, toxic, multinodular 11/23/2018    Thyroid nodule 10/25/2018    Bilateral inguinal hernia 07/13/2017    Seasonal allergies 2016    Depressive disorder 2013    Diverticulosis of large intestine 2013    Gastroesophageal reflux disease 2013    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    Hypertensive disorder 2013     Past Surgical History:   Procedure Laterality Date    ABDOMEN SURGERY      CATARACT REMOVAL WITH IMPLANT  2018    bilateral  and      SECTION      FINGER REPLANTATION      right thumb implant due to arthritis    HYSTERECTOMY  1993    KNEE ARTHROSCOPY      bilateral    LOBECTOMY  1974    lower back    NOSE SURGERY      sinus    TUBAL LIGATION      TUMOR REMOVAL  1973    2x on back side of neck     Family History   Adopted: Yes   Problem Relation Age of Onset    Other Mother     Other Sister     Colon Cancer Brother      Social History     Socioeconomic History    Marital status:      Spouse name: Not on file    Number of children: Not on file    Years of education: Not on file    Highest education level: Not on file   Occupational History    Not on file   Tobacco Use    Smoking status: Former Smoker     Packs/day: 1.00     Years: 26.00     Pack years: 26.00     Quit date:      Years since quittin.3    Smokeless tobacco: Never Used   Substance and Sexual Activity    Alcohol use: Yes     Alcohol/week: 0.0 standard drinks     Comment: occassionally    Drug use: No    Sexual activity: Not on file   Other Topics Concern    Not on file   Social History Narrative    Not on file     Social Determinants of Health     Financial Resource Strain: Low Risk     Difficulty of Paying Living Expenses: Not very hard   Food Insecurity: No Food Insecurity    Worried About Running Out of Food in the Last Year: Never true    Ramos of Food in the Last Year: Never true   Transportation Needs:     Lack of Transportation (Medical): Not on file    Lack of Transportation (Non-Medical):  Not on file   Physical Activity:     Days of Exercise per Week: Not on file    Minutes of Exercise per Session: Not on file   Stress:     Feeling of Stress : Not on file   Social Connections:     Frequency of Communication with Friends and Family: Not on file    Frequency of Social Gatherings with Friends and Family: Not on file    Attends Islam Services: Not on file    Active Member of [address] [place] [address] or Organizations: Not on file    Attends Club or Organization Meetings: Not on file    Marital Status: Not on file   Intimate Partner Violence:     Fear of Current or Ex-Partner: Not on file    Emotionally Abused: Not on file    Physically Abused: Not on file    Sexually Abused: Not on file   Housing Stability:     Unable to Pay for Housing in the Last Year: Not on file    Number of [place] in the Last Year: Not on file    Unstable Housing in the Last Year: Not on file     Allergies   Allergen Reactions    Meperidine Other (See Comments)     Upset stomach    Morphine Other (See Comments)     Upset stomach    Nasal Spray     Percocet [Oxycodone-Acetaminophen]      Upset stomach       Review of Systems   Constitutional: Negative for chills and fever. HENT: Negative for facial swelling and nosebleeds. Eyes: Negative for photophobia and visual disturbance. Respiratory: Negative for apnea and choking. Cardiovascular: Positive for palpitations. Negative for chest pain. Gastrointestinal: Negative for abdominal distention and blood in stool. Genitourinary: Negative for enuresis and hematuria. Musculoskeletal: Negative for gait problem and joint swelling. Skin: Negative for rash. Neurological: Negative for syncope and speech difficulty. Hematological: Does not bruise/bleed easily. Psychiatric/Behavioral: Negative for hallucinations and suicidal ideas. There were no vitals filed for this visit. Physical Exam  Constitutional:       Appearance: She is well-developed. HENT:      Head: Normocephalic and atraumatic.    Eyes:      Pupils: Pupils are equal,

## 2022-05-16 DIAGNOSIS — E05.90 HYPERTHYROIDISM: ICD-10-CM

## 2022-05-16 LAB
CHOLESTEROL, TOTAL: 206 MG/DL (ref 0–199)
HCT VFR BLD CALC: 42.2 % (ref 37–47)
HDLC SERPL-MCNC: 40 MG/DL (ref 40–59)
HEMOGLOBIN: 14 G/DL (ref 12–16)
LDL CHOLESTEROL CALCULATED: 106 MG/DL (ref 0–129)
MCH RBC QN AUTO: 29.4 PG (ref 27–31.3)
MCHC RBC AUTO-ENTMCNC: 33.3 % (ref 33–37)
MCV RBC AUTO: 88.2 FL (ref 82–100)
PDW BLD-RTO: 13.1 % (ref 11.5–14.5)
PLATELET # BLD: 293 K/UL (ref 130–400)
RBC # BLD: 4.78 M/UL (ref 4.2–5.4)
T4 FREE: 1.14 NG/DL (ref 0.84–1.68)
TRIGL SERPL-MCNC: 300 MG/DL (ref 0–150)
TSH SERPL DL<=0.05 MIU/L-ACNC: 0.07 UIU/ML (ref 0.44–3.86)
WBC # BLD: 11.1 K/UL (ref 4.8–10.8)

## 2022-05-26 ENCOUNTER — TELEMEDICINE (OUTPATIENT)
Dept: PRIMARY CARE CLINIC | Age: 72
End: 2022-05-26
Payer: MEDICARE

## 2022-05-26 ENCOUNTER — TELEPHONE (OUTPATIENT)
Dept: PRIMARY CARE CLINIC | Age: 72
End: 2022-05-26

## 2022-05-26 DIAGNOSIS — U07.1 COVID-19 VIRUS INFECTION: Primary | ICD-10-CM

## 2022-05-26 PROCEDURE — 1090F PRES/ABSN URINE INCON ASSESS: CPT | Performed by: INTERNAL MEDICINE

## 2022-05-26 PROCEDURE — G8417 CALC BMI ABV UP PARAM F/U: HCPCS | Performed by: INTERNAL MEDICINE

## 2022-05-26 PROCEDURE — 1036F TOBACCO NON-USER: CPT | Performed by: INTERNAL MEDICINE

## 2022-05-26 PROCEDURE — G8399 PT W/DXA RESULTS DOCUMENT: HCPCS | Performed by: INTERNAL MEDICINE

## 2022-05-26 PROCEDURE — G8427 DOCREV CUR MEDS BY ELIG CLIN: HCPCS | Performed by: INTERNAL MEDICINE

## 2022-05-26 PROCEDURE — 3017F COLORECTAL CA SCREEN DOC REV: CPT | Performed by: INTERNAL MEDICINE

## 2022-05-26 PROCEDURE — 1123F ACP DISCUSS/DSCN MKR DOCD: CPT | Performed by: INTERNAL MEDICINE

## 2022-05-26 PROCEDURE — 99213 OFFICE O/P EST LOW 20 MIN: CPT | Performed by: INTERNAL MEDICINE

## 2022-05-26 RX ORDER — ACETAMINOPHEN AND CODEINE PHOSPHATE 300; 30 MG/1; MG/1
TABLET ORAL
COMMUNITY
Start: 2021-08-27

## 2022-05-26 ASSESSMENT — ENCOUNTER SYMPTOMS
DIARRHEA: 0
COUGH: 1
VOMITING: 0
SHORTNESS OF BREATH: 0
NAUSEA: 0
WHEEZING: 0
SINUS PAIN: 1
SINUS PRESSURE: 1
SORE THROAT: 0
RHINORRHEA: 0
ABDOMINAL PAIN: 0

## 2022-05-26 NOTE — PROGRESS NOTES
2022    TELEHEALTH EVALUATION -- Audio/Visual (During RJPDQ-63 public health emergency)    HPI:    Zaki Forrester (:  1950) has requested an audio/video evaluation for the following concern(s):    Pt presents with 1 week of cough productive of yellow  sputum. No SOB, no wheezing, no fever but had chills. No generalized body aches and headache. No wheezing, no sore throat, no nausea or vomiting. Testsed positive for COVID. Fully vaccinated against COVID. Assoc sinus congestion and runnynose. Review of Systems   Constitutional: Negative for chills, diaphoresis, fatigue and fever. HENT: Positive for congestion, sinus pressure and sinus pain. Negative for ear discharge, ear pain, rhinorrhea, sneezing and sore throat. Respiratory: Positive for cough. Negative for shortness of breath and wheezing. Cardiovascular: Negative for chest pain. Gastrointestinal: Negative for abdominal pain, diarrhea, nausea and vomiting. Endocrine: Negative for cold intolerance and heat intolerance. Genitourinary: Negative for dysuria and frequency. Neurological: Negative for dizziness and light-headedness. Prior to Visit Medications    Medication Sig Taking? Authorizing Provider   acetaminophen-codeine (TYLENOL #3) 300-30 MG per tablet Take by mouth. Yes Historical Provider, MD   nirmatrelvir/ritonavir (PAXLOVID) 20 x 150 MG & 10 x 100MG Take 3 tablets (two 150 mg nirmatrelvir and one 100 mg ritonavir tablets) by mouth every 12 hours for 5 days.  Yes Sandhya Upton MD   propranolol (INDERAL LA) 80 MG extended release capsule TAKE ONE CAPSULE BY MOUTH DAILY Yes Historical Provider, MD   methIMAzole (TAPAZOLE) 5 MG tablet 1/2 po 4 days   a week Yes Lina Frances MD   folic acid (FOLVITE) 1 MG tablet TAKE 1 TABLET BY MOUTH  DAILY Yes Marcel Valdivia MD   propranolol (INDERAL LA) 60 MG extended release capsule TAKE 1 CAPSULE BY MOUTH  DAILY Yes Kamilah Iqbal MD   traMADol (ULTRAM) 50 MG tablet  Yes Historical GERD (gastroesophageal reflux disease)     Heart palpitations 2020    Hereditary motor and sensory neuropathy     Hypertension     Hypertension     Hyperthyroidism     seen dr Sukhi Michelle and dr Barney Cary Neck pain     Need for pneumococcal vaccination 2019    Osteoarthritis     Seasonal allergies 2016    Smoker     quit , lobectomy left    SOB (shortness of breath) 2015    SOB (shortness of breath) 2015   ,   Past Surgical History:   Procedure Laterality Date    ABDOMEN SURGERY      CATARACT REMOVAL WITH IMPLANT  2018    bilateral  and      SECTION      FINGER REPLANTATION      right thumb implant due to arthritis    HYSTERECTOMY      KNEE ARTHROSCOPY      bilateral    LOBECTOMY  1974    lower back    NOSE SURGERY      sinus    TUBAL LIGATION      TUMOR REMOVAL  1973    2x on back side of neck   ,   Social History     Tobacco Use    Smoking status: Former Smoker     Packs/day: 1.00     Years: 26.00     Pack years: 26.00     Quit date:      Years since quittin.4    Smokeless tobacco: Never Used   Substance Use Topics    Alcohol use: Yes     Alcohol/week: 0.0 standard drinks     Comment: occassionally    Drug use: No       PHYSICAL EXAMINATION:  [ INSTRUCTIONS:  \"[x]\" Indicates a positive item  \"[]\" Indicates a negative item  -- DELETE ALL ITEMS NOT EXAMINED]  Vital Signs: (As obtained by patient/caregiver or practitioner observation)  Blood pressure-  Heart rate-    Respiratory rate-    Temperature-  Pulse oximetry-   Constitutional: [] Appears well-developed and well-nourished [] No apparent distress      [] Abnormal-   Mental status  [x] Alert and awake  [x] Oriented to person/place/time []Able to follow commands    Eyes:  EOM    []  Normal  [] Abnormal-  Sclera  []  Normal  [] Abnormal -         Discharge []  None visible  [] Abnormal -  HENT:   [] Normocephalic, atraumatic.   [] Abnormal   [] Mouth/Throat: Mucous membranes are moist.   External Ears [] Normal  [] Abnormal-   Neck: [] No visualized mass   Pulmonary/Chest: [x] Respiratory effort normal.  [x] No visualized signs of difficulty breathing or respiratory distress        [] Abnormal-    Musculoskeletal:   [] Normal gait with no signs of ataxia         [] Normal range of motion of neck        [] Abnormal-     Neurological:        [] No Facial Asymmetry (Cranial nerve 7 motor function) (limited exam to video visit)          [] No gaze palsy        [] Abnormal-         Skin:        [] No significant exanthematous lesions or discoloration noted on facial skin         [] Abnormal-            Psychiatric:       [] Normal Affect [] No Hallucinations        [] Abnormal-     Other pertinent observable physical exam findings-     ASSESSMENT/PLAN:  1. COVID-19 virus infection  - nirmatrelvir/ritonavir (PAXLOVID) 20 x 150 MG & 10 x 100MG; Take 3 tablets (two 150 mg nirmatrelvir and one 100 mg ritonavir tablets) by mouth every 12 hours for 5 days. Dispense: 30 tablet; Refill: 0    Pt advised to remain isolated from day 1-5 of symptom onset. After this, end isolation if symptoms are improving and no fever for at least 24 hours. From day 6-10, you must wear a mask inside and outside. If more SOB or fatigued, go to ER ASAP. Otherwise, rest, fluid and APAP prn. No follow-ups on file. Salina Her, was evaluated through a synchronous (real-time) audio-video encounter. The patient (or guardian if applicable) is aware that this is a billable service, which includes applicable co-pays. This Virtual Visit was conducted with patient's (and/or legal guardian's) consent. The visit was conducted pursuant to the emergency declaration under the [address] [place] [address], [address] [place] [address] authority and the [employer] and [employer] General Act. Patient identification was verified, and a caregiver was present when appropriate.    The patient was located at Home: [address] [address] [address] [place]. Provider was located at [facility] (Appt Dept): [address] [place] [address]  [facility],  [address] [facility]. Total time spent on this encounter: Not billed by time    --Zohreh Colvin MD on 5/26/2022 at 12:55 PM    An electronic signature was used to authenticate this note.   [address] [place] [address]

## 2022-05-26 NOTE — TELEPHONE ENCOUNTER
----- Message from [facility] sent at 5/26/2022  2:19 PM EDT -----  Subject: Message to Provider    QUESTIONS  Information for Provider? Patient called in to let Gurpreet Maxwell know tat the   COVID medication she sent to her preferred pharmacy does not carry the   medication. She stated that the only pharmacy that may carry it is the   mail in pharmacy and by the time she gets it from the mail order pharmacy   her CVID will be over. She would like for her to know that she is fine   without taking the medication because she will not get it in time. Please   contact patient to further assist.   ---------------------------------------------------------------------------  --------------  CALL BACK INFO  What is the best way for the office to contact you? OK to leave message on   voicemail  Preferred Call Back Phone Number? [phone]  ---------------------------------------------------------------------------  --------------  SCRIPT ANSWERS  Relationship to Patient?  Self

## 2022-06-06 ENCOUNTER — TELEMEDICINE (OUTPATIENT)
Dept: ENDOCRINOLOGY | Age: 72
End: 2022-06-06
Payer: MEDICARE

## 2022-06-06 DIAGNOSIS — E05.90 HYPERTHYROIDISM: Primary | ICD-10-CM

## 2022-06-06 DIAGNOSIS — E04.1 HOT THYROID NODULE: ICD-10-CM

## 2022-06-06 PROCEDURE — G8428 CUR MEDS NOT DOCUMENT: HCPCS | Performed by: INTERNAL MEDICINE

## 2022-06-06 PROCEDURE — 1090F PRES/ABSN URINE INCON ASSESS: CPT | Performed by: INTERNAL MEDICINE

## 2022-06-06 PROCEDURE — 1123F ACP DISCUSS/DSCN MKR DOCD: CPT | Performed by: INTERNAL MEDICINE

## 2022-06-06 PROCEDURE — 3017F COLORECTAL CA SCREEN DOC REV: CPT | Performed by: INTERNAL MEDICINE

## 2022-06-06 PROCEDURE — G8399 PT W/DXA RESULTS DOCUMENT: HCPCS | Performed by: INTERNAL MEDICINE

## 2022-06-06 PROCEDURE — 99213 OFFICE O/P EST LOW 20 MIN: CPT | Performed by: INTERNAL MEDICINE

## 2022-06-06 RX ORDER — PROPRANOLOL HYDROCHLORIDE 80 MG/1
80 CAPSULE, EXTENDED RELEASE ORAL DAILY
Qty: 90 CAPSULE | Refills: 3 | Status: SHIPPED | OUTPATIENT
Start: 2022-06-06

## 2022-06-06 ASSESSMENT — ENCOUNTER SYMPTOMS: EYES NEGATIVE: 1

## 2022-06-06 NOTE — PROGRESS NOTES
2022    TELEHEALTH EVALUATION -- Audio/Visual (During NMEVM-33 public health emergency)    Due to Roger 19 outbreak, patient's office visit was converted to a virtual visit. Patient was contacted and agreed to proceed with a virtual visit via [email or web address] me  The risks and benefits of converting to a virtual visit were discussed in light of the current infectious disease epidemic. Patient also understood that insurance coverage and co-pays are up to their individual insurance plans. HPI: Video visit patient was at home I was at my office follow-up on hyper thyroidism patient on Tapazole 5 mg half a pill 4 days a week recently also seen cardiologist propranolol dose was increased to 80 mg daily most current thyroid function test was reviewed fairly stable    Javi Archibald (:  1950) has requested an audio/video evaluation for the following concern(s):      Results for Jerry Tafoya" (MRN [id number]) as of 2022 16:37   Ref. Range 2021 11:42 10/19/2021 09:49 2022 13:35 2022 11:56 2022 11:59   Triglycerides Latest Ref Range: 0 - 150 mg/dL     300 (H)   TSH Latest Ref Range: 0.440 - 3.860 uIU/mL 0.028 (L) 0.268 (L) 0.038 (L) 0.072 (L)    T4 Free Latest Ref Range: 0.84 - 1.68 ng/dL 1.19 1.00 1.41 1.14    WBC Latest Ref Range: 4.8 - 10.8 K/uL 7.9 9.4  11.1 (H)    RBC Latest Ref Range: 4.20 - 5.40 M/uL 4.94 4.74  4.78    Hemoglobin Quant Latest Ref Range: 12.0 - 16.0 g/dL 14.8 14.3  14.0    Hematocrit Latest Ref Range: 37.0 - 47.0 % 43.6 41.8  42.2    MCV Latest Ref Range: 82.0 - 100.0 fL 88.2 88.2  88.2    MCH Latest Ref Range: 27.0 - 31.3 pg 30.0 30.2  29.4    MCHC Latest Ref Range: 33.0 - 37.0 % 34.0 34.3  33.3    RDW Latest Ref Range: 11.5 - 14.5 % 14.0 13.2  13.1    Platelet Count Latest Ref Range: 130 - 400 K/uL 288 293  293      Review of Systems   Eyes: Negative. Cardiovascular: Negative. Endocrine: Negative.     All other systems reviewed and are negative. Prior to Visit Medications    Medication Sig Taking? Authorizing Provider   acetaminophen-codeine (TYLENOL #3) 300-30 MG per tablet Take by mouth. Historical Provider, MD   propranolol (INDERAL LA) 80 MG extended release capsule TAKE ONE CAPSULE BY MOUTH DAILY  Historical Provider, MD   methIMAzole (TAPAZOLE) 5 MG tablet 1/2 po 4 days   a week  Jude Clark MD   folic acid (FOLVITE) 1 MG tablet TAKE 1 TABLET BY MOUTH  DAILY  Kassi Benitez MD   propranolol (INDERAL LA) 60 MG extended release capsule TAKE 1 CAPSULE BY MOUTH  DAILY  Clary Mcpherson MD   traMADol (ULTRAM) 50 MG tablet   Historical Provider, MD   DULoxetine (CYMBALTA) 30 MG extended release capsule   Historical Provider, MD   lidocaine (LIDODERM) 5 %   Historical Provider, MD   montelukast (SINGULAIR) 10 MG tablet TAKE 1 TABLET BY MOUTH AT  NIGHT  Clary Mcpherson MD   losartan-hydroCHLOROthiazide (HYZAAR) 100-12.5 MG per tablet TAKE 1 TABLET BY MOUTH  DAILY  Clary Mcpherson MD   PARoxetine (PAXIL) 20 MG tablet Take 1 tablet by mouth nightly  Kassi Benitez MD   Cholecalciferol (VITAMIN D) 50 MCG ( UT) TABS tablet Take 1 tablet by mouth daily  INO Rodas CNP   loratadine (CLARITIN) 10 MG tablet Take 1 tablet by mouth nightly  INO Rodas CNP   esomeprazole (NEXIUM) 20 MG delayed release capsule Take 1 capsule by mouth every morning (before breakfast)  INO Rodas CNP   diclofenac (VOLTAREN) 50 MG EC tablet Take 1 tablet by mouth 2 times daily  INO Rodas CNP   diclofenac sodium 1 % GEL   Historical Provider, MD       Social History     Tobacco Use    Smoking status: Former Smoker     Packs/day: 1.00     Years: 26.00     Pack years: 26.00     Quit date:      Years since quittin.4    Smokeless tobacco: Never Used   Substance Use Topics    Alcohol use: Yes     Alcohol/week: 0.0 standard drinks     Comment: occassionally    Drug use:  No            PHYSICAL EXAMINATION:  [ INSTRUCTIONS:  \"[x]\" Indicates a positive item  \"[]\" Indicates a negative item  -- DELETE ALL ITEMS NOT EXAMINED]  [] Alert  [] Oriented to person/place/time    [] No apparent distress  [] Toxic appearing    [] Face flushed appearing [] Sclera clear  [] Lips are cyanotic      [] Breathing appears normal  [] Appears tachypneic      [] Rash on visible skin    [] Cranial Nerves II-XII grossly intact    [] Motor grossly intact in visible upper extremities    [] Motor grossly intact in visible lower extremities    [] Normal Mood  [] Anxious appearing    [] Depressed appearing  [] Confused appearing      [] Poor short term memory  [] Poor long term memory    [] OTHER:      Due to this being a TeleHealth encounter, evaluation of the following organ systems is limited: Vitals/Constitutional/EENT/Resp/CV/GI//MS/Neuro/Skin/Heme-Lymph-Imm. ASSESSMENT/PLAN:     Diagnosis Orders   1. Hyperthyroidism  T4, Free    TSH    CBC   2. Hot thyroid nodule       Orders Placed This Encounter   Procedures    T4, Free     Standing Status:   Future     Standing Expiration Date:   6/6/2023    TSH     Standing Status:   Future     Standing Expiration Date:   6/6/2023    CBC     Standing Status:   Future     Standing Expiration Date:   6/6/2023     Orders Placed This Encounter   Medications    propranolol (INDERAL LA) 80 MG extended release capsule     Sig: Take 1 capsule by mouth daily     Dispense:  90 capsule     Refill:  3     Continue current dose of Tapazole  Repeat labs in 3 to 6 months  Monitor thyroid function test closely  An  electronic signature was used to authenticate this note.     --Siria Hawley MD on 6/6/2022 at 4:36 PM        Pursuant to the emergency declaration under the [facility], [address] [place] [address] authority and the [employer] and [employer] Act, this Virtual  Visit was conducted, with patient's consent, to reduce the patient's risk of exposure to COVID-19 and provide continuity of care for an established patient. Services were provided through a video synchronous discussion virtually to substitute for in-person clinic visit.

## 2022-07-08 ENCOUNTER — TELEPHONE (OUTPATIENT)
Dept: GASTROENTEROLOGY | Age: 72
End: 2022-07-08

## 2022-07-12 ENCOUNTER — TELEPHONE (OUTPATIENT)
Dept: PRIMARY CARE CLINIC | Age: 72
End: 2022-07-12

## 2022-07-12 DIAGNOSIS — Z12.11 COLON CANCER SCREENING: Primary | ICD-10-CM

## 2022-07-12 NOTE — TELEPHONE ENCOUNTER
Spoke with pt over the phone and she was wondering if she could get a cologuard order placed. Please advice.

## 2022-07-24 LAB — NONINV COLON CA DNA+OCC BLD SCRN STL QL: NEGATIVE

## 2022-08-02 DIAGNOSIS — E05.90 HYPERTHYROIDISM: ICD-10-CM

## 2022-08-02 LAB
HCT VFR BLD CALC: 42.2 % (ref 37–47)
HEMOGLOBIN: 13.9 G/DL (ref 12–16)
MCH RBC QN AUTO: 29.1 PG (ref 27–31.3)
MCHC RBC AUTO-ENTMCNC: 33 % (ref 33–37)
MCV RBC AUTO: 88.2 FL (ref 82–100)
PDW BLD-RTO: 13.4 % (ref 11.5–14.5)
PLATELET # BLD: 275 K/UL (ref 130–400)
RBC # BLD: 4.79 M/UL (ref 4.2–5.4)
T4 FREE: 0.97 NG/DL (ref 0.84–1.68)
TSH SERPL DL<=0.05 MIU/L-ACNC: 0.1 UIU/ML (ref 0.44–3.86)
WBC # BLD: 10.4 K/UL (ref 4.8–10.8)

## 2022-09-08 NOTE — TELEPHONE ENCOUNTER
[facility] CLINICAL PHARMACY: ADHERENCE REVIEW  Identified care gap per [employer]: fills at [facility]: ACE/ARB adherence    Last Visit: 05.26.22 (pcp), 08.19.21 (cardio)        ASSESSMENT  ACE/ARB ADHERENCE    Insurance Records claims through 08.22.22 (Prior Year PDC = PASSED; YTD Jer Tucker =  81%; Potential Fail Date: 09.17.22 ):   Losartan HCTZ last filled on 04.05.22 for 90 day supply. Next refill due: 07.04.22    Per  [place] Portal:  (same as above). BP Readings from Last 3 Encounters:   02/12/22 138/66   10/23/21 (!) 160/94   10/18/21 132/88     CrCl cannot be calculated (Patient's most recent lab result is older than the maximum 180 days allowed. ). PLAN  The following are interventions that have been identified:   - Patient overdue refilling Losartan HCTZ and active on home medication list.     Attempting to reach patient to review. Left message asking for return call. No future appointments.     Lyn Solorzano, [facility]  Phone: toll free [phone]

## 2022-09-08 NOTE — TELEPHONE ENCOUNTER
Patient returned call and stated she is taking her medication but sometimes dont remember to take them. She would like the pharmacy to process a refill and or have her provider send in a new rx for Losartan hctz. The prescribing pcp is Landen Goodwin MD.    Education provided    I let her know it may be a good idea to get a pill box to help her remember to take her medications each day. She stated \"I will not give in to a pill box\". \"I will remember to take my medications on time.     She will [facility] a refill order for Lidocaine patches and diclofenac gel    Sending encounter to pharmacist to request a new rx for Losartan hctz   prescribing pcp is Landen Goodwin MD

## 2022-09-13 RX ORDER — LOSARTAN POTASSIUM AND HYDROCHLOROTHIAZIDE 12.5; 1 MG/1; MG/1
TABLET ORAL
Qty: 90 TABLET | Refills: 3 | Status: SHIPPED | OUTPATIENT
Start: 2022-09-13

## 2022-09-14 NOTE — TELEPHONE ENCOUNTER
POPULATION [facility] CLINICAL PHARMACY REVIEW: ADHERENCE    Outreach to [employer] - technician states medication was received and will be shipped out today.      Jv Orozco, PharmD, [facility], [address]   Department, toll free: [phone], option 1    =======================================================    For Pharmacy Admin Tracking Only  Recommendation Provided To: Provider: 1 via Note to Provider and Patient/Caregiver: 1 via Telephone  Intervention Detail: Adherence Monitorin and Refill(s) Provided  Gap Closed?: Yes   Intervention Accepted By: Provider: 1 and Patient/Caregiver: 1  Time Spent (min): 30

## 2022-10-03 ENCOUNTER — TELEPHONE (OUTPATIENT)
Dept: PRIMARY CARE CLINIC | Age: 72
End: 2022-10-03

## 2022-10-03 ENCOUNTER — OFFICE VISIT (OUTPATIENT)
Dept: PRIMARY CARE CLINIC | Age: 72
End: 2022-10-03
Payer: MEDICARE

## 2022-10-03 VITALS
TEMPERATURE: 97.8 F | DIASTOLIC BLOOD PRESSURE: 76 MMHG | HEIGHT: 63 IN | RESPIRATION RATE: 24 BRPM | HEART RATE: 55 BPM | WEIGHT: 146.6 LBS | SYSTOLIC BLOOD PRESSURE: 120 MMHG | BODY MASS INDEX: 25.98 KG/M2 | OXYGEN SATURATION: 96 %

## 2022-10-03 DIAGNOSIS — R05.1 ACUTE COUGH: ICD-10-CM

## 2022-10-03 DIAGNOSIS — Z91.81 AT HIGH RISK FOR FALLS: ICD-10-CM

## 2022-10-03 DIAGNOSIS — J40 BRONCHITIS: Primary | ICD-10-CM

## 2022-10-03 LAB
Lab: NORMAL
PERFORMING INSTRUMENT: NORMAL
QC PASS/FAIL: NORMAL
SARS-COV-2, POC: NORMAL

## 2022-10-03 PROCEDURE — G8484 FLU IMMUNIZE NO ADMIN: HCPCS | Performed by: NURSE PRACTITIONER

## 2022-10-03 PROCEDURE — G8427 DOCREV CUR MEDS BY ELIG CLIN: HCPCS | Performed by: NURSE PRACTITIONER

## 2022-10-03 PROCEDURE — 1036F TOBACCO NON-USER: CPT | Performed by: NURSE PRACTITIONER

## 2022-10-03 PROCEDURE — 1090F PRES/ABSN URINE INCON ASSESS: CPT | Performed by: NURSE PRACTITIONER

## 2022-10-03 PROCEDURE — 1123F ACP DISCUSS/DSCN MKR DOCD: CPT | Performed by: NURSE PRACTITIONER

## 2022-10-03 PROCEDURE — G8417 CALC BMI ABV UP PARAM F/U: HCPCS | Performed by: NURSE PRACTITIONER

## 2022-10-03 PROCEDURE — G8399 PT W/DXA RESULTS DOCUMENT: HCPCS | Performed by: NURSE PRACTITIONER

## 2022-10-03 PROCEDURE — 99214 OFFICE O/P EST MOD 30 MIN: CPT | Performed by: NURSE PRACTITIONER

## 2022-10-03 PROCEDURE — 87426 SARSCOV CORONAVIRUS AG IA: CPT | Performed by: NURSE PRACTITIONER

## 2022-10-03 PROCEDURE — 3017F COLORECTAL CA SCREEN DOC REV: CPT | Performed by: NURSE PRACTITIONER

## 2022-10-03 RX ORDER — AMOXICILLIN AND CLAVULANATE POTASSIUM 875; 125 MG/1; MG/1
1 TABLET, FILM COATED ORAL 2 TIMES DAILY
Qty: 20 TABLET | Refills: 0 | Status: SHIPPED | OUTPATIENT
Start: 2022-10-03 | End: 2022-10-13

## 2022-10-03 RX ORDER — BENZONATATE 100 MG/1
100 CAPSULE ORAL 3 TIMES DAILY PRN
Qty: 21 CAPSULE | Refills: 0 | Status: SHIPPED | OUTPATIENT
Start: 2022-10-03 | End: 2022-10-10

## 2022-10-03 ASSESSMENT — PATIENT HEALTH QUESTIONNAIRE - PHQ9
SUM OF ALL RESPONSES TO PHQ9 QUESTIONS 1 & 2: 0
SUM OF ALL RESPONSES TO PHQ QUESTIONS 1-9: 0
5. POOR APPETITE OR OVEREATING: 0
7. TROUBLE CONCENTRATING ON THINGS, SUCH AS READING THE NEWSPAPER OR WATCHING TELEVISION: 0
SUM OF ALL RESPONSES TO PHQ QUESTIONS 1-9: 0
4. FEELING TIRED OR HAVING LITTLE ENERGY: 0
10. IF YOU CHECKED OFF ANY PROBLEMS, HOW DIFFICULT HAVE THESE PROBLEMS MADE IT FOR YOU TO DO YOUR WORK, TAKE CARE OF THINGS AT HOME, OR GET ALONG WITH OTHER PEOPLE: 0
6. FEELING BAD ABOUT YOURSELF - OR THAT YOU ARE A FAILURE OR HAVE LET YOURSELF OR YOUR FAMILY DOWN: 0
SUM OF ALL RESPONSES TO PHQ QUESTIONS 1-9: 0
3. TROUBLE FALLING OR STAYING ASLEEP: 0
2. FEELING DOWN, DEPRESSED OR HOPELESS: 0
9. THOUGHTS THAT YOU WOULD BE BETTER OFF DEAD, OR OF HURTING YOURSELF: 0
8. MOVING OR SPEAKING SO SLOWLY THAT OTHER PEOPLE COULD HAVE NOTICED. OR THE OPPOSITE, BEING SO FIGETY OR RESTLESS THAT YOU HAVE BEEN MOVING AROUND A LOT MORE THAN USUAL: 0
SUM OF ALL RESPONSES TO PHQ QUESTIONS 1-9: 0
1. LITTLE INTEREST OR PLEASURE IN DOING THINGS: 0

## 2022-10-03 ASSESSMENT — ENCOUNTER SYMPTOMS
CONSTIPATION: 0
ABDOMINAL DISTENTION: 0
EYE REDNESS: 0
WHEEZING: 0
SINUS PAIN: 0
EYE ITCHING: 0
DIARRHEA: 0
APNEA: 0
CHEST TIGHTNESS: 1
NAUSEA: 0
SORE THROAT: 0
SWOLLEN GLANDS: 0
VOMITING: 0
COUGH: 1
SHORTNESS OF BREATH: 0

## 2022-10-03 NOTE — PROGRESS NOTES
Subjective:      Patient ID: Travis Case is a 74 y.o. female who presents today for:  Chief Complaint   Patient presents with    Chest Congestion     Bronchitis concern.  has cold, just came back from [facility] 09/18. 1 week ago, got tickle in throat, cough, difficulty breathing, head congestion, bilateral ear aches. Pt here today and she reports she is vaccinated. Pt reports she has a HX of Bronchitis and \"believes she is dealing with it now\". Pt declines any active distress but her chest congestion and cough is worsening\". Chest Congestion  This is a new problem. The current episode started in the past 7 days. The problem occurs intermittently. The problem has been gradually worsening. Associated symptoms include arthralgias, congestion (pt reports productive sputum) and coughing. Pertinent negatives include no chest pain, fever, headaches, myalgias, nausea, rash, sore throat, swollen glands, vomiting or weakness. Nothing aggravates the symptoms. She has tried sleep, rest and drinking (vicks nyquil, cough drops, coricdin) for the symptoms. The treatment provided mild relief. Cough  This is a new problem. The current episode started in the past 7 days. The problem has been gradually worsening. The cough is Non-productive. Associated symptoms include postnasal drip and rhinorrhea. Pertinent negatives include no chest pain, ear pain, eye redness, fever, headaches, myalgias, rash, sore throat, shortness of breath or wheezing. Nothing aggravates the symptoms. Treatments tried: otc meds. Her past medical history is significant for bronchiectasis, bronchitis and environmental allergies (pt reports taking allergy med). There is no history of asthma, emphysema or pneumonia.      Past Medical History:   Diagnosis Date    Bronchitis with bronchospasm 05/24/2019    Bursitis of both hips     Cancer (HCC)     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    Chronic bilateral low back pain     Chronic midline thoracic back pain     Contusion of right eyelid 2019    Cough 2016    Depression     Dizziness 2015    Dizziness 2015    Gastritis with hemorrhage     GERD (gastroesophageal reflux disease)     Heart palpitations 2020    Hereditary motor and sensory neuropathy     Hypertension     Hypertension     Hyperthyroidism     seen dr Yuni Arita and dr Mary Rice    Neck pain     Need for pneumococcal vaccination 2019    Osteoarthritis     Seasonal allergies 2016    Smoker     quit , lobectomy left    SOB (shortness of breath) 2015    SOB (shortness of breath) 2015     Past Surgical History:   Procedure Laterality Date    ABDOMEN SURGERY      CATARACT REMOVAL WITH IMPLANT  2018    bilateral  and      SECTION      FINGER REPLANTATION      right thumb implant due to arthritis    HYSTERECTOMY (CERVIX STATUS UNKNOWN)      KNEE ARTHROSCOPY      bilateral    LOBECTOMY  1974    lower back    NOSE SURGERY      sinus    TUBAL LIGATION      TUMOR REMOVAL  1973    2x on back side of neck     Social History     Socioeconomic History    Marital status:      Spouse name: Not on file    Number of children: Not on file    Years of education: Not on file    Highest education level: Not on file   Occupational History    Not on file   Tobacco Use    Smoking status: Former     Packs/day: 1.00     Years: 26.00     Pack years: 26.00     Types: Cigarettes     Quit date: 0     Years since quittin.7    Smokeless tobacco: Never   Substance and Sexual Activity    Alcohol use:  Yes     Alcohol/week: 0.0 standard drinks     Comment: occassionally    Drug use: No    Sexual activity: Not on file   Other Topics Concern    Not on file   Social History Narrative    Not on file     Social Determinants of Health     Financial Resource Strain: Low Risk     Difficulty of Paying Living Expenses: Not very hard   Food Insecurity: No Food Insecurity    Worried About Running Out of Food in the Last Year: Never true    Ran Out of Food in the Last Year: Never true   Transportation Needs: Not on file   Physical Activity: Not on file   Stress: Not on file   Social Connections: Not on file   Intimate Partner Violence: Not on file   Housing Stability: Not on file     Family History   Adopted: Yes   Problem Relation Age of Onset    Other Mother     Other Sister     Colon Cancer Brother      Allergies   Allergen Reactions    Meperidine Other (See Comments)     Upset stomach    Morphine Other (See Comments)     Upset stomach    Nasal Spray     Oxymetazoline     Percocet [Oxycodone-Acetaminophen]      Upset stomach         Review of Systems   Constitutional:  Negative for activity change, appetite change and fever. HENT:  Positive for congestion (pt reports productive sputum), postnasal drip and rhinorrhea. Negative for drooling, ear pain, hearing loss, sinus pain and sore throat. Eyes:  Negative for redness, itching and visual disturbance. Respiratory:  Positive for cough and chest tightness. Negative for apnea, shortness of breath and wheezing. Cardiovascular:  Negative for chest pain and palpitations. Gastrointestinal:  Negative for abdominal distention, constipation, diarrhea, nausea and vomiting. Endocrine: Negative for heat intolerance. Genitourinary:  Negative for difficulty urinating, flank pain and genital sores. Musculoskeletal:  Positive for arthralgias. Negative for gait problem, myalgias and neck stiffness. Skin:  Negative for rash. Allergic/Immunologic: Positive for environmental allergies (pt reports taking allergy med). Neurological:  Negative for tremors, seizures, facial asymmetry, weakness and headaches. Hematological:  Negative for adenopathy. Psychiatric/Behavioral:  Negative for behavioral problems, confusion and suicidal ideas. The patient is not hyperactive. All other systems reviewed and are negative.     Objective:   /76 (Site: Left Upper Arm, Position: Sitting, Cuff Size: Large Adult)   Pulse 55   Temp 97.8 °F (36.6 °C) (Temporal)   Resp 24   Ht 5' 3\" (1.6 m)   Wt 146 lb 9.6 oz (66.5 kg)   SpO2 96%   BMI 25.97 kg/m²     Physical Exam  Vitals and nursing note reviewed. Constitutional:       General: She is awake. She is not in acute distress. Appearance: Normal appearance. She is well-developed and well-groomed. She is not ill-appearing, toxic-appearing or diaphoretic. HENT:      Head: Normocephalic and atraumatic. Right Ear: Tympanic membrane normal.      Left Ear: Tympanic membrane normal.      Nose: Rhinorrhea present. Mouth/Throat:      Mouth: Mucous membranes are moist.      Pharynx: Oropharynx is clear. Posterior oropharyngeal erythema present. No oropharyngeal exudate. Eyes:      Extraocular Movements: Extraocular movements intact. Conjunctiva/sclera: Conjunctivae normal.      Pupils: Pupils are equal, round, and reactive to light. Cardiovascular:      Rate and Rhythm: Normal rate and regular rhythm. Pulses: Normal pulses. Heart sounds: Normal heart sounds. No murmur heard. Pulmonary:      Effort: Pulmonary effort is normal. No tachypnea or bradypnea. Breath sounds: Normal breath sounds and air entry. No transmitted upper airway sounds. No decreased breath sounds, wheezing or rhonchi. Abdominal:      General: Bowel sounds are normal. There is no distension. Palpations: Abdomen is soft. Tenderness: There is no abdominal tenderness. There is no right CVA tenderness, left CVA tenderness or guarding. Musculoskeletal:         General: No signs of injury. Normal range of motion. Cervical back: Normal range of motion. Lymphadenopathy:      Cervical: No cervical adenopathy. Skin:     General: Skin is warm and dry. Capillary Refill: Capillary refill takes less than 2 seconds. Findings: No erythema. Neurological:      General: No focal deficit present.       Mental Status: She is alert and oriented to person, place, and time. Mental status is at baseline. Motor: No weakness. Coordination: Coordination normal.   Psychiatric:         Attention and Perception: Attention and perception normal.         Mood and Affect: Mood and affect normal.         Speech: Speech normal.         Behavior: Behavior normal. Behavior is cooperative. Thought Content: Thought content normal.         Judgment: Judgment normal.       Assessment:       Diagnosis Orders   1. Bronchitis  amoxicillin-clavulanate (AUGMENTIN) 875-125 MG per tablet    XR CHEST STANDARD (2 VW)    POCT COVID-19, Antigen      2. Acute cough  benzonatate (TESSALON) 100 MG capsule    XR CHEST STANDARD (2 VW)    POCT COVID-19, Antigen      3. At high risk for falls              Plan:      Orders Placed This Encounter   Procedures    XR CHEST STANDARD (2 VW)     Standing Status:   Future     Number of Occurrences:   1     Standing Expiration Date:   10/3/2023     Order Specific Question:   Reason for exam:     Answer:   r/o pneumonia    POCT COVID-19, Antigen     Order Specific Question:   Is this test for diagnosis or screening? Answer:   Diagnosis of ill patient     Order Specific Question:   Symptomatic for COVID-19 as defined by CDC? Answer:   Yes     Order Specific Question:   Date of Symptom Onset     Answer:   9/26/2022     Order Specific Question:   Hospitalized for COVID-19? Answer:   No     Order Specific Question:   Admitted to ICU for COVID-19? Answer:   No     Order Specific Question:   Employed in healthcare setting? Answer:   No     Order Specific Question:   Resident in a congregate (group) care setting? Answer:   No     Order Specific Question:   Pregnant? Answer:   No     Order Specific Question:   Previously tested for COVID-19?      Answer:   Yes       Orders Placed This Encounter   Medications    amoxicillin-clavulanate (AUGMENTIN) 875-125 MG per tablet     Sig: Take 1 tablet by mouth 2 times daily for 10 days     Dispense:  20 tablet     Refill:  0    benzonatate (TESSALON) 100 MG capsule     Sig: Take 1 capsule by mouth 3 times daily as needed for Cough     Dispense:  21 capsule     Refill:  0       Pt here today for c/o Bronchitis as she has had cough and congestion that is not getting any better. Pt aware if her s/s worsen such as drooling, trouble breathing, swallowing, chest pain, SOB to go to the ER. Pt aware and advised to increase his fluids and rest. Pt declines any distress today. Pt aware to eat prior to taking her oral ATB and if she has loose stools to increase her diet by using the BRAT diet as discussed. Pt made aware her covid in office test today was WNL and neg. Pt aware. Pt left the [facility] today in stable condition. Antibiotic Instructions: Complete the full course of antibiotics as ordered. Take each dose with a small snack or meal to lessen potential GI upset. To prevent antibiotic resistance, please take medication as ordered and for the full duration even if you start to feel better. Consider intake of yogurt or probiotic during antibiotic use and for a few days after to help reduce the risk of developing a secondary infection. Separate the yogurt and antibiotic by at least 1 hour. Avoid alcohol while taking antibiotics. Return if symptoms worsen or fail to improve. Reviewed with the patient: current clinical status, medications, activities and diet. Side effects, adverse effects of the medication prescribed today, as well as treatment plan and result expectations have been discussed with the patient who expresses understanding and desires to proceed. Close follow up to evaluate treatment results and for coordination of care. I have reviewed the patient's medical history in detail and updated the computerized patient record.       INO Allison CNP          On the basis of positive falls risk screening, assessment and plan is as follows: home safety tips provided.

## 2022-10-04 ASSESSMENT — ENCOUNTER SYMPTOMS: RHINORRHEA: 1

## 2022-10-10 DIAGNOSIS — E53.8 FOLIC ACID DEFICIENCY: ICD-10-CM

## 2022-10-11 RX ORDER — FOLIC ACID 1 MG/1
1 TABLET ORAL DAILY
Qty: 90 TABLET | Refills: 2 | Status: SHIPPED | OUTPATIENT
Start: 2022-10-11

## 2022-10-13 ENCOUNTER — PATIENT MESSAGE (OUTPATIENT)
Dept: ENDOCRINOLOGY | Age: 72
End: 2022-10-13

## 2022-10-13 DIAGNOSIS — E05.90 HYPERTHYROIDISM: Primary | ICD-10-CM

## 2022-10-13 NOTE — TELEPHONE ENCOUNTER
From: Kae Webb  To: Dr. Yan Lizama  Sent: 10/13/2022 12:12 PM EDT  Subject: Order labs for upcoming appointment dec 10 2022    Can Dr. Beryle Putnam order labs for me please so I can have results for him when I go to my appointment with him on Dec 10 2022.  Thank you

## 2022-11-03 ENCOUNTER — TELEPHONE (OUTPATIENT)
Dept: FAMILY MEDICINE CLINIC | Age: 72
End: 2022-11-03

## 2022-11-03 DIAGNOSIS — Z12.31 ENCOUNTER FOR SCREENING MAMMOGRAM FOR MALIGNANT NEOPLASM OF BREAST: Primary | ICD-10-CM

## 2022-12-01 DIAGNOSIS — E05.90 HYPERTHYROIDISM: ICD-10-CM

## 2022-12-01 LAB
HCT VFR BLD CALC: 39.9 % (ref 37–47)
HEMOGLOBIN: 13.5 G/DL (ref 12–16)
MCH RBC QN AUTO: 29.6 PG (ref 27–31.3)
MCHC RBC AUTO-ENTMCNC: 33.8 % (ref 33–37)
MCV RBC AUTO: 87.7 FL (ref 79.4–94.8)
PDW BLD-RTO: 13.5 % (ref 11.5–14.5)
PLATELET # BLD: 261 K/UL (ref 130–400)
RBC # BLD: 4.55 M/UL (ref 4.2–5.4)
T4 FREE: 1.05 NG/DL (ref 0.84–1.68)
TSH SERPL DL<=0.05 MIU/L-ACNC: 0.14 UIU/ML (ref 0.44–3.86)
WBC # BLD: 7.5 K/UL (ref 4.8–10.8)

## 2022-12-05 RX ORDER — METHIMAZOLE 5 MG/1
TABLET ORAL
Qty: 26 TABLET | Refills: 3 | Status: SHIPPED | OUTPATIENT
Start: 2022-12-05 | End: 2022-12-10 | Stop reason: SDUPTHER

## 2022-12-07 RX ORDER — MONTELUKAST SODIUM 10 MG/1
TABLET ORAL
Qty: 90 TABLET | Refills: 3 | Status: SHIPPED | OUTPATIENT
Start: 2022-12-07

## 2022-12-10 ENCOUNTER — OFFICE VISIT (OUTPATIENT)
Dept: ENDOCRINOLOGY | Age: 72
End: 2022-12-10
Payer: MEDICARE

## 2022-12-10 VITALS
OXYGEN SATURATION: 96 % | HEIGHT: 63 IN | HEART RATE: 60 BPM | DIASTOLIC BLOOD PRESSURE: 80 MMHG | SYSTOLIC BLOOD PRESSURE: 124 MMHG | BODY MASS INDEX: 25.87 KG/M2 | WEIGHT: 146 LBS

## 2022-12-10 DIAGNOSIS — E05.90 HYPERTHYROIDISM: Primary | ICD-10-CM

## 2022-12-10 PROCEDURE — 99213 OFFICE O/P EST LOW 20 MIN: CPT | Performed by: INTERNAL MEDICINE

## 2022-12-10 PROCEDURE — 3078F DIAST BP <80 MM HG: CPT | Performed by: INTERNAL MEDICINE

## 2022-12-10 PROCEDURE — 3017F COLORECTAL CA SCREEN DOC REV: CPT | Performed by: INTERNAL MEDICINE

## 2022-12-10 PROCEDURE — G8427 DOCREV CUR MEDS BY ELIG CLIN: HCPCS | Performed by: INTERNAL MEDICINE

## 2022-12-10 PROCEDURE — G8484 FLU IMMUNIZE NO ADMIN: HCPCS | Performed by: INTERNAL MEDICINE

## 2022-12-10 PROCEDURE — 1123F ACP DISCUSS/DSCN MKR DOCD: CPT | Performed by: INTERNAL MEDICINE

## 2022-12-10 PROCEDURE — G8399 PT W/DXA RESULTS DOCUMENT: HCPCS | Performed by: INTERNAL MEDICINE

## 2022-12-10 PROCEDURE — 3074F SYST BP LT 130 MM HG: CPT | Performed by: INTERNAL MEDICINE

## 2022-12-10 PROCEDURE — 1090F PRES/ABSN URINE INCON ASSESS: CPT | Performed by: INTERNAL MEDICINE

## 2022-12-10 PROCEDURE — 1036F TOBACCO NON-USER: CPT | Performed by: INTERNAL MEDICINE

## 2022-12-10 PROCEDURE — G8417 CALC BMI ABV UP PARAM F/U: HCPCS | Performed by: INTERNAL MEDICINE

## 2022-12-10 RX ORDER — METHIMAZOLE 5 MG/1
TABLET ORAL
Qty: 50 TABLET | Refills: 3 | Status: SHIPPED | OUTPATIENT
Start: 2022-12-10

## 2022-12-10 RX ORDER — PROPRANOLOL HYDROCHLORIDE 80 MG/1
80 CAPSULE, EXTENDED RELEASE ORAL DAILY
Qty: 90 CAPSULE | Refills: 3 | Status: SHIPPED | OUTPATIENT
Start: 2022-12-10

## 2022-12-10 ASSESSMENT — ENCOUNTER SYMPTOMS: ROS SKIN COMMENTS: HAIR LOSS

## 2022-12-10 NOTE — PROGRESS NOTES
12/10/2022    Assessment:       Diagnosis Orders   1. Hyperthyroidism              PLAN:     Orders Placed This Encounter   Procedures    T4, Free     Standing Status:   Future     Standing Expiration Date:   12/10/2023    TSH with Reflex     Standing Status:   Future     Standing Expiration Date:   12/10/2023     Orders Placed This Encounter   Medications    methIMAzole (TAPAZOLE) 5 MG tablet     Sig: TAKE ONE-HALF TABLET BY  MOUTH 4 DAYS PER WEEK     Dispense:  50 tablet     Refill:  3     Requesting 1 year supply    propranolol (INDERAL LA) 80 MG extended release capsule     Sig: Take 1 capsule by mouth daily     Dispense:  90 capsule     Refill:  3   Advised to use over-the-counter Rogaine    Subjective:     Chief Complaint   Patient presents with    Hyperthyroidism     Vitals:    12/10/22 1103   BP: 124/80   Pulse: 60   SpO2: 96%   Weight: 146 lb (66.2 kg)   Height: 5' 3\" (1.6 m)     Wt Readings from Last 3 Encounters:   12/10/22 146 lb (66.2 kg)   10/03/22 146 lb 9.6 oz (66.5 kg)   02/12/22 148 lb (67.1 kg)     BP Readings from Last 3 Encounters:   12/10/22 124/80   10/03/22 120/76   02/12/22 138/66     Follow-up on hyper thyroidism patient is on low-dose Tapazole and propranolol labs were reviewed TSH has been suppressed but improving free T4 has been stable requesting refills  Complains of hair loss    Thyroid Problem  Presents for follow-up visit. Patient reports no cold intolerance, heat intolerance, weight gain or weight loss. The symptoms have been stable.    Past Medical History:   Diagnosis Date    Bronchitis with bronchospasm 05/24/2019    Bursitis of both hips     Cancer ([address]     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    Chronic bilateral low back pain     Chronic midline thoracic back pain     Contusion of right eyelid 06/05/2019    Cough 11/17/2016    Depression     Dizziness 06/18/2015    Dizziness 06/18/2015    Gastritis with hemorrhage     GERD (gastroesophageal reflux disease)     Heart palpitations 2020    Hereditary motor and sensory neuropathy     Hypertension     Hypertension     Hyperthyroidism     seen dr James Marie and dr Madie Arzola    Neck pain     Need for pneumococcal vaccination 2019    Osteoarthritis     Seasonal allergies 2016    Smoker     quit , lobectomy left    SOB (shortness of breath) 2015    SOB (shortness of breath) 2015     Past Surgical History:   Procedure Laterality Date    ABDOMEN SURGERY      CATARACT REMOVAL WITH IMPLANT  2018    bilateral  and      SECTION      FINGER REPLANTATION      right thumb implant due to arthritis    HYSTERECTOMY (CERVIX STATUS UNKNOWN)      KNEE ARTHROSCOPY      bilateral    LOBECTOMY  1974    lower back    NOSE SURGERY      sinus    TUBAL LIGATION      TUMOR REMOVAL  1973    2x on back side of neck     Social History     Socioeconomic History    Marital status:      Spouse name: Not on file    Number of children: Not on file    Years of education: Not on file    Highest education level: Not on file   Occupational History    Not on file   Tobacco Use    Smoking status: Former     Packs/day: 1.00     Years: 26.00     Pack years: 26.00     Types: Cigarettes     Quit date: 0     Years since quittin.9    Smokeless tobacco: Never   Substance and Sexual Activity    Alcohol use:  Yes     Alcohol/week: 0.0 standard drinks     Comment: occassionally    Drug use: No    Sexual activity: Not on file   Other Topics Concern    Not on file   Social History Narrative    Not on file     Social Determinants of Health     Financial Resource Strain: Not on file   Food Insecurity: Not on file   Transportation Needs: Not on file   Physical Activity: Not on file   Stress: Not on file   Social Connections: Not on file   Intimate Partner Violence: Not on file   Housing Stability: Not on file     Family History   Adopted: Yes   Problem Relation Age of Onset    Other Mother     Other Sister     Colon Cancer Brother Allergies   Allergen Reactions    Meperidine Other (See Comments)     Upset stomach    Morphine Other (See Comments)     Upset stomach    Nasal Spray     Oxymetazoline     Percocet [Oxycodone-Acetaminophen]      Upset stomach       Current Outpatient Medications:     montelukast (SINGULAIR) 10 MG tablet, TAKE 1 TABLET BY MOUTH AT  NIGHT, Disp: 90 tablet, Rfl: 3    methIMAzole (TAPAZOLE) 5 MG tablet, TAKE ONE-HALF TABLET BY  MOUTH 4 DAYS PER WEEK, Disp: 26 tablet, Rfl: 3    folic acid (FOLVITE) 1 MG tablet, TAKE 1 TABLET BY MOUTH  DAILY, Disp: 90 tablet, Rfl: 2    losartan-hydroCHLOROthiazide (HYZAAR) 100-12.5 MG per tablet, TAKE 1 TABLET BY MOUTH  DAILY, Disp: 90 tablet, Rfl: 3    propranolol (INDERAL LA) 80 MG extended release capsule, Take 1 capsule by mouth daily, Disp: 90 capsule, Rfl: 3    acetaminophen-codeine (TYLENOL #3) 300-30 MG per tablet, Take by mouth., Disp: , Rfl:     propranolol (INDERAL LA) 80 MG extended release capsule, , Disp: , Rfl:     traMADol (ULTRAM) 50 MG tablet, , Disp: , Rfl:     DULoxetine (CYMBALTA) 30 MG extended release capsule, , Disp: , Rfl:     lidocaine (LIDODERM) 5 %, , Disp: , Rfl:     PARoxetine (PAXIL) 20 MG tablet, Take 1 tablet by mouth nightly, Disp: 90 tablet, Rfl: 3    Cholecalciferol (VITAMIN D) 50 MCG (2000 UT) TABS tablet, Take 1 tablet by mouth daily, Disp: 90 tablet, Rfl: 1    loratadine (CLARITIN) 10 MG tablet, Take 1 tablet by mouth nightly, Disp: 90 tablet, Rfl: 1    esomeprazole (NEXIUM) 20 MG delayed release capsule, Take 1 capsule by mouth every morning (before breakfast), Disp: 90 capsule, Rfl: 1    diclofenac sodium 1 % GEL, , Disp: , Rfl:     diclofenac (VOLTAREN) 50 MG EC tablet, Take 1 tablet by mouth 2 times daily, Disp: 60 tablet, Rfl: 0  Lab Results   Component Value Date     07/19/2021    K 4.3 07/19/2021     07/19/2021    CO2 26 07/19/2021    BUN 25 (H) 07/19/2021    CREATININE 0.67 07/19/2021    GLUCOSE 76 07/19/2021    CALCIUM 10.5 (H) 07/19/2021    PROT 6.1 (L) 07/19/2021    LABALBU 4.2 07/19/2021    BILITOT 0.4 07/19/2021    ALKPHOS 110 07/19/2021    AST 14 07/19/2021    ALT 10 07/19/2021    LABGLOM >60.0 07/19/2021    GFRAA >60.0 07/19/2021    GLOB 1.9 (L) 07/19/2021     Lab Results   Component Value Date    WBC 7.5 12/01/2022    HGB 13.5 12/01/2022    HCT 39.9 12/01/2022    MCV 87.7 12/01/2022     12/01/2022     Lab Results   Component Value Date    LABA1C 4.7 (L) 07/19/2021    LABA1C 4.8 07/31/2019     Lab Results   Component Value Date    CHOLFAST 200 (H) 07/19/2021    TRIGLYCFAST 223 (H) 07/19/2021    HDL 40 05/16/2022    HDL 41 07/19/2021    HDL 47 07/31/2019    LDLCALC 106 05/16/2022    LDLCALC 114 07/19/2021    LDLCALC 118 07/31/2019    CHOL 206 (H) 05/16/2022    CHOL 197 07/31/2019    CHOL 177 07/07/2015    TRIG 300 (H) 05/16/2022    TRIG 159 (H) 07/31/2019    TRIG 187 07/07/2015     No results found for: TESTM  Lab Results   Component Value Date    TSH 0.143 (L) 12/01/2022    TSH 0.099 (L) 08/02/2022    TSH 0.072 (L) 05/16/2022    TSHREFLEX 0.037 (L) 05/06/2019    T4FREE 1.05 12/01/2022    T4FREE 0.97 08/02/2022    T4FREE 1.14 05/16/2022     Lab Results   Component Value Date    TPOABS <10.0 03/12/2020       Review of Systems   Constitutional:  Negative for weight gain and weight loss. Endocrine: Negative for cold intolerance and heat intolerance. Skin:         Hair loss      Objective:   Physical Exam  Vitals reviewed. Constitutional:       General: She is not in acute distress. Appearance: Normal appearance. HENT:      Head: Normocephalic and atraumatic. Right Ear: External ear normal.      Left Ear: External ear normal.      Nose: Nose normal.   Eyes:      General: No scleral icterus. Right eye: No discharge. Left eye: No discharge. Extraocular Movements: Extraocular movements intact. Conjunctiva/sclera: Conjunctivae normal.   Cardiovascular:      Rate and Rhythm: Normal rate. Pulmonary:      Effort: Pulmonary effort is normal.   Musculoskeletal:         General: Normal range of motion. Cervical back: Normal range of motion and neck supple. Neurological:      General: No focal deficit present. Mental Status: She is alert and oriented to person, place, and time.    Psychiatric:         Mood and Affect: Mood normal.         Behavior: Behavior normal.

## 2022-12-15 ENCOUNTER — TELEPHONE (OUTPATIENT)
Dept: PHARMACY | Facility: CLINIC | Age: 72
End: 2022-12-15

## 2022-12-15 NOTE — TELEPHONE ENCOUNTER
[facility] CLINICAL PHARMACY: ADHERENCE REVIEW  Identified care gap per [employer]: fills at [facility]: ACE/ARB adherence    Last Visit: 10/03/22    ASSESSMENT  ACE/ARB ADHERENCE    LOSARTAN/HCT -12.5 last filled on 22 for 90 day supply. Next refill due: 22    Per [facility]:   LOSARTAN/HCT -12.5 last picked up on 22 for 90 day supply. 2 refills remaining. Billed through [employer] 0.00 copay     BP Readings from Last 3 Encounters:   12/10/22 124/80   10/03/22 120/76   22 138/66     CrCl cannot be calculated (Patient's most recent lab result is older than the maximum 180 days allowed. ). PLAN  No patient out reach planned at this time.     Pt appears to be adherent     Will sign off      Future Appointments   Date Time Provider Roxy Garcia   2023 11:00 AM MD Christal Carrillo   // Department, toll free [phone], Option 2    For Pharmacy Admin Tracking Only    CPA in place:  No  Intervention Detail: Adherence Monitorin  Gap Closed?: No   Time Spent (min): 10

## 2023-03-13 DIAGNOSIS — E05.90 HYPERTHYROIDISM: ICD-10-CM

## 2023-03-13 RX ORDER — METHIMAZOLE 5 MG/1
TABLET ORAL
Qty: 50 TABLET | Refills: 3 | Status: SHIPPED | OUTPATIENT
Start: 2023-03-13

## 2023-03-14 ENCOUNTER — OFFICE VISIT (OUTPATIENT)
Dept: FAMILY MEDICINE CLINIC | Age: 73
End: 2023-03-14

## 2023-03-14 DIAGNOSIS — Z00.00 MEDICARE ANNUAL WELLNESS VISIT, SUBSEQUENT: Primary | ICD-10-CM

## 2023-03-14 SDOH — ECONOMIC STABILITY: TRANSPORTATION INSECURITY
IN THE PAST 12 MONTHS, HAS THE LACK OF TRANSPORTATION KEPT YOU FROM MEDICAL APPOINTMENTS OR FROM GETTING MEDICATIONS?: NO

## 2023-03-14 SDOH — ECONOMIC STABILITY: TRANSPORTATION INSECURITY
IN THE PAST 12 MONTHS, HAS LACK OF TRANSPORTATION KEPT YOU FROM MEETINGS, WORK, OR FROM GETTING THINGS NEEDED FOR DAILY LIVING?: NO

## 2023-03-14 SDOH — ECONOMIC STABILITY: INCOME INSECURITY: IN THE LAST 12 MONTHS, WAS THERE A TIME WHEN YOU WERE NOT ABLE TO PAY THE MORTGAGE OR RENT ON TIME?: NO

## 2023-03-14 SDOH — ECONOMIC STABILITY: HOUSING INSECURITY: IN THE LAST 12 MONTHS, HOW MANY PLACES HAVE YOU LIVED?: 1

## 2023-03-14 SDOH — ECONOMIC STABILITY: HOUSING INSECURITY
IN THE LAST 12 MONTHS, WAS THERE A TIME WHEN YOU DID NOT HAVE A STEADY PLACE TO SLEEP OR SLEPT IN A SHELTER (INCLUDING NOW)?: NO

## 2023-03-14 ASSESSMENT — PATIENT HEALTH QUESTIONNAIRE - PHQ9
1. LITTLE INTEREST OR PLEASURE IN DOING THINGS: 1
2. FEELING DOWN, DEPRESSED OR HOPELESS: 0
SUM OF ALL RESPONSES TO PHQ QUESTIONS 1-9: 1
SUM OF ALL RESPONSES TO PHQ QUESTIONS 1-9: 1
SUM OF ALL RESPONSES TO PHQ9 QUESTIONS 1 & 2: 1
SUM OF ALL RESPONSES TO PHQ QUESTIONS 1-9: 1
SUM OF ALL RESPONSES TO PHQ QUESTIONS 1-9: 1

## 2023-03-14 ASSESSMENT — LIFESTYLE VARIABLES
HOW MANY STANDARD DRINKS CONTAINING ALCOHOL DO YOU HAVE ON A TYPICAL DAY: PATIENT DOES NOT DRINK
HOW OFTEN DO YOU HAVE A DRINK CONTAINING ALCOHOL: MONTHLY OR LESS

## 2023-03-14 ASSESSMENT — SOCIAL DETERMINANTS OF HEALTH (SDOH): HOW HARD IS IT FOR YOU TO PAY FOR THE VERY BASICS LIKE FOOD, HOUSING, MEDICAL CARE, AND HEATING?: NOT HARD AT ALL

## 2023-03-14 NOTE — PATIENT INSTRUCTIONS
A Healthy Heart: Care Instructions  Your Care Instructions     Coronary artery disease, also called heart disease, occurs when a substance called plaque builds up in the vessels that supply oxygen-rich blood to your heart muscle. This can narrow the blood vessels and reduce blood flow. A heart attack happens when blood flow is completely blocked. A high-fat diet, smoking, and other factors increase the risk of heart disease. Your doctor has found that you have a chance of having heart disease. You can do lots of things to keep your heart healthy. It may not be easy, but you can change your diet, exercise more, and quit smoking. These steps really work to lower your chance of heart disease. Follow-up care is a key part of your treatment and safety. Be sure to make and go to all appointments, and call your doctor if you are having problems. It's also a good idea to know your test results and keep a list of the medicines you take. How can you care for yourself at home? Diet    Use less salt when you cook and eat. This helps lower your blood pressure. Taste food before salting. Add only a little salt when you think you need it. With time, your taste buds will adjust to less salt.     Eat fewer snack items, fast foods, canned soups, and other high-salt, high-fat, processed foods.     Read food labels and try to avoid saturated and trans fats. They increase your risk of heart disease by raising cholesterol levels.     Limit the amount of solid fat-butter, margarine, and shortening-you eat. Use olive, peanut, or canola oil when you cook. Bake, broil, and steam foods instead of frying them.     Eat a variety of fruit and vegetables every day. Dark green, deep orange, red, or yellow fruits and vegetables are especially good for you. Examples include spinach, carrots, peaches, and berries.     Foods high in fiber can reduce your cholesterol and provide important vitamins and minerals.  High-fiber foods include whole-grain cereals and breads, oatmeal, beans, brown rice, citrus fruits, and apples.     Eat lean proteins. Heart-healthy proteins include seafood, lean meats and poultry, eggs, beans, peas, nuts, seeds, and soy products.     Limit drinks and foods with added sugar. These include candy, desserts, and soda pop.   Lifestyle changes    If your doctor recommends it, get more exercise. Walking is a good choice. Bit by bit, increase the amount you walk every day. Try for at least 30 minutes on most days of the week. You also may want to swim, bike, or do other activities.     Do not smoke. If you need help quitting, talk to your doctor about stop-smoking programs and medicines. These can increase your chances of quitting for good. Quitting smoking may be the most important step you can take to protect your heart. It is never too late to quit.     Limit alcohol to 2 drinks a day for men and 1 drink a day for women. Too much alcohol can cause health problems.     Manage other health problems such as diabetes, high blood pressure, and high cholesterol. If you think you may have a problem with alcohol or drug use, talk to your doctor.   Medicines    Take your medicines exactly as prescribed. Call your doctor if you think you are having a problem with your medicine.     If your doctor recommends aspirin, take the amount directed each day. Make sure you take aspirin and not another kind of pain reliever, such as acetaminophen (Tylenol).   When should you call for help?   Call 911 if you have symptoms of a heart attack. These may include:    Chest pain or pressure, or a strange feeling in the chest.     Sweating.     Shortness of breath.     Pain, pressure, or a strange feeling in the back, neck, jaw, or upper belly or in one or both shoulders or arms.     Lightheadedness or sudden weakness.     A fast or irregular heartbeat.   After you call 911, the  may tell you to chew 1 adult-strength or 2 to 4 low-dose aspirin.  Wait for an ambulance. Do not try to drive yourself. Watch closely for changes in your health, and be sure to contact your doctor if you have any problems. Where can you learn more? Go to http://www.corrales.com/ and enter F075 to learn more about \"A Healthy Heart: Care Instructions. \"  Current as of: September 7, 2022               Content Version: 13.5  © 2006-2022 [employer]. Care instructions adapted under license by [facility] ([facility]). If you have questions about a medical condition or this instruction, always ask your healthcare professional. [place] [address] any warranty or liability for your use of this information. Personalized Preventive Plan for Althea Noble - 3/14/2023  Medicare offers a range of preventive health benefits. Some of the tests and screenings are paid in full while other may be subject to a deductible, co-insurance, and/or copay. Some of these benefits include a comprehensive review of your medical history including lifestyle, illnesses that may run in your family, and various assessments and screenings as appropriate. After reviewing your medical record and screening and assessments performed today your provider may have ordered immunizations, labs, imaging, and/or referrals for you. A list of these orders (if applicable) as well as your Preventive Care list are included within your After Visit Summary for your review. Other Preventive Recommendations:    A preventive eye exam performed by an eye specialist is recommended every 1-2 years to screen for glaucoma; cataracts, macular degeneration, and other eye disorders. A preventive dental visit is recommended every 6 months. Try to get at least 150 minutes of exercise per week or 10,000 steps per day on a pedometer . Order or download the FREE \"Exercise & Physical Activity: Your Everyday Guide\" from [employer].  Call 6-732.505.3499 or search [facility] on Aging online. You need 9705-0429 mg of calcium and 6904-6529 IU of vitamin D per day. It is possible to meet your calcium requirement with diet alone, but a vitamin D supplement is usually necessary to meet this goal.  When exposed to the sun, use a sunscreen that protects against both UVA and UVB radiation with an SPF of 30 or greater. Reapply every 2 to 3 hours or after sweating, drying off with a towel, or swimming. Always wear a seat belt when traveling in a car. Always wear a helmet when riding a bicycle or motorcycle.

## 2023-03-14 NOTE — PROGRESS NOTES
Medicare Annual Wellness Visit    Melissa Ibarra is here for Medicare AWV    Assessment & Plan   Medicare annual wellness visit, subsequent      Recommendations for Preventive Services Due: see orders and patient instructions/AVS.  Recommended screening schedule for the next 5-10 years is provided to the patient in written form: see Patient Instructions/AVS.     Return for Medicare Annual Wellness Visit in 1 year. Subjective       Patient's complete Health Risk Assessment and screening values have been reviewed and are found in Flowsheets. The following problems were reviewed today and where indicated follow up appointments were made and/or referrals ordered. Positive Risk Factor Screenings with Interventions:    Fall Risk:  Do you feel unsteady or are you worried about falling? : (!) yes (Has weakness d/t back. Says she is seeing neurologist and has been for about 2 years.)  2 or more falls in past year?: (!) yes (Twisted ankle off step twice.)  Fall with injury in past year?: no     Interventions:    Patient is seeing Neurology             Opioid Risk: (Low risk score <55) Opioid risk score: 8    Patient is low risk for opioid use disorder or overdose. Last PDMP Leodan Galindo as Reviewed:  Review User Review Instant Review Result              Last Controlled Substance Monitoring Documentation      Flowsheet Row Telephone from 1/17/2020 in [facility]   Periodic Controlled Substance Monitoring No signs of potential drug abuse or diversion identified. filed at 01/17/2020 1840              General HRA Questions:  Select all that apply: (!) Stress (Related to issues with daughter.)    Stress Interventions:  Patient is having issues with daughter. She made appointment with PCP to discuss anxiety medication. Objective   There were no vitals filed for this visit. There is no height or weight on file to calculate BMI.                Allergies   Allergen Reactions Meperidine Other (See Comments)     Upset stomach    Morphine Other (See Comments)     Upset stomach    Nasal Spray     Oxymetazoline     Percocet [Oxycodone-Acetaminophen]      Upset stomach     Prior to Visit Medications    Medication Sig Taking? Authorizing Provider   methIMAzole (TAPAZOLE) 5 MG tablet TAKE ONE-HALF TABLET BY  MOUTH 4 DAYS PER WEEK  Leonel Mejia MD   Handicap Placard MISC by Does not apply route 2/10/23-2/10/28 five years   Unable to walk more than 200 feet without having to stop.  Recurrent falls  Albino Castellano MD   propranolol (INDERAL LA) 80 MG extended release capsule Take 1 capsule by mouth daily  Leonel Mejia MD   montelukast (SINGULAIR) 10 MG tablet TAKE 1 TABLET BY MOUTH AT  NIGHT  Albino Castellano MD   folic acid (FOLVITE) 1 MG tablet TAKE 1 TABLET BY MOUTH  DAILY  Albino Castellano MD   losartan-hydroCHLOROthiazide (HYZAAR) 100-12.5 MG per tablet TAKE 1 TABLET BY MOUTH  DAILY  Albino Castellano MD   traMADol (ULTRAM) 50 MG tablet   Historical Provider, MD   lidocaine (LIDODERM) 5 %   Historical Provider, MD   PARoxetine (PAXIL) 20 MG tablet Take 1 tablet by mouth nightly  Albino Castellano MD   Cholecalciferol (VITAMIN D) 50 MCG (2000 UT) TABS tablet Take 1 tablet by mouth daily  INO Rodas CNP   loratadine (CLARITIN) 10 MG tablet Take 1 tablet by mouth nightly  INO Rodas CNP   esomeprazole (NEXIUM) 20 MG delayed release capsule Take 1 capsule by mouth every morning (before breakfast)  INO Hwang CNP   diclofenac (VOLTAREN) 50 MG EC tablet Take 1 tablet by mouth 2 times daily  INO Hwang CNP   diclofenac sodium 1 % GEL   Historical Provider, MD Ritchie (Including outside providers/suppliers regularly involved in providing care):   Patient Care Team:  Albino Castellano MD as PCP - General (Internal Medicine)  Albino Castellano MD as PCP - Empaneled Provider  Caterina Martinez MD as Physician (Cardiology)     Reviewed and updated this visit:  Tobacco Allergies  Meds  Med Hx  Surg Hx  Soc Hx  Fam Hx             MAGALIS Polo, APRN - CNP

## 2023-03-23 ENCOUNTER — OFFICE VISIT (OUTPATIENT)
Dept: PRIMARY CARE CLINIC | Age: 73
End: 2023-03-23
Payer: MEDICARE

## 2023-03-23 VITALS — HEIGHT: 63 IN | WEIGHT: 146 LBS | OXYGEN SATURATION: 95 % | HEART RATE: 68 BPM | BODY MASS INDEX: 25.87 KG/M2

## 2023-03-23 DIAGNOSIS — F32.A DEPRESSIVE DISORDER: ICD-10-CM

## 2023-03-23 DIAGNOSIS — C49.9 ANGIOSARCOMA (HCC): ICD-10-CM

## 2023-03-23 DIAGNOSIS — F41.9 ANXIETY: Primary | ICD-10-CM

## 2023-03-23 PROBLEM — F33.1 MAJOR DEPRESSIVE DISORDER, RECURRENT, MODERATE (HCC): Status: RESOLVED | Noted: 2023-03-23 | Resolved: 2023-03-23

## 2023-03-23 PROBLEM — F33.9 MAJOR DEPRESSIVE DISORDER, RECURRENT, UNSPECIFIED (HCC): Status: ACTIVE | Noted: 2023-03-23

## 2023-03-23 PROBLEM — F33.9 MAJOR DEPRESSIVE DISORDER, RECURRENT, UNSPECIFIED (HCC): Status: RESOLVED | Noted: 2023-03-23 | Resolved: 2023-03-23

## 2023-03-23 PROBLEM — F33.0 MAJOR DEPRESSIVE DISORDER, RECURRENT, MILD (HCC): Status: ACTIVE | Noted: 2023-03-23

## 2023-03-23 PROBLEM — F33.1 MAJOR DEPRESSIVE DISORDER, RECURRENT, MODERATE (HCC): Status: ACTIVE | Noted: 2023-03-23

## 2023-03-23 PROCEDURE — G8399 PT W/DXA RESULTS DOCUMENT: HCPCS | Performed by: INTERNAL MEDICINE

## 2023-03-23 PROCEDURE — 3017F COLORECTAL CA SCREEN DOC REV: CPT | Performed by: INTERNAL MEDICINE

## 2023-03-23 PROCEDURE — G8484 FLU IMMUNIZE NO ADMIN: HCPCS | Performed by: INTERNAL MEDICINE

## 2023-03-23 PROCEDURE — 1036F TOBACCO NON-USER: CPT | Performed by: INTERNAL MEDICINE

## 2023-03-23 PROCEDURE — G8417 CALC BMI ABV UP PARAM F/U: HCPCS | Performed by: INTERNAL MEDICINE

## 2023-03-23 PROCEDURE — G8427 DOCREV CUR MEDS BY ELIG CLIN: HCPCS | Performed by: INTERNAL MEDICINE

## 2023-03-23 PROCEDURE — 99213 OFFICE O/P EST LOW 20 MIN: CPT | Performed by: INTERNAL MEDICINE

## 2023-03-23 PROCEDURE — 1090F PRES/ABSN URINE INCON ASSESS: CPT | Performed by: INTERNAL MEDICINE

## 2023-03-23 PROCEDURE — 1123F ACP DISCUSS/DSCN MKR DOCD: CPT | Performed by: INTERNAL MEDICINE

## 2023-03-23 RX ORDER — PAROXETINE 30 MG/1
30 TABLET, FILM COATED ORAL NIGHTLY
Qty: 90 TABLET | Refills: 2 | Status: SHIPPED | OUTPATIENT
Start: 2023-03-23

## 2023-03-23 SDOH — ECONOMIC STABILITY: FOOD INSECURITY: WITHIN THE PAST 12 MONTHS, YOU WORRIED THAT YOUR FOOD WOULD RUN OUT BEFORE YOU GOT MONEY TO BUY MORE.: NEVER TRUE

## 2023-03-23 SDOH — ECONOMIC STABILITY: FOOD INSECURITY: WITHIN THE PAST 12 MONTHS, THE FOOD YOU BOUGHT JUST DIDN'T LAST AND YOU DIDN'T HAVE MONEY TO GET MORE.: NEVER TRUE

## 2023-03-23 SDOH — ECONOMIC STABILITY: INCOME INSECURITY: HOW HARD IS IT FOR YOU TO PAY FOR THE VERY BASICS LIKE FOOD, HOUSING, MEDICAL CARE, AND HEATING?: NOT HARD AT ALL

## 2023-03-23 ASSESSMENT — ENCOUNTER SYMPTOMS
APNEA: 0
BLOOD IN STOOL: 0
CHOKING: 0
ABDOMINAL DISTENTION: 0
FACIAL SWELLING: 0

## 2023-04-10 ENCOUNTER — TELEPHONE (OUTPATIENT)
Dept: PRIMARY CARE CLINIC | Age: 73
End: 2023-04-10

## 2023-05-05 DIAGNOSIS — I15.8 OTHER SECONDARY HYPERTENSION: Primary | ICD-10-CM

## 2023-05-05 RX ORDER — LOSARTAN POTASSIUM AND HYDROCHLOROTHIAZIDE 12.5; 1 MG/1; MG/1
TABLET ORAL
Qty: 100 TABLET | Refills: 0 | Status: SHIPPED | OUTPATIENT
Start: 2023-05-05

## 2023-06-22 DIAGNOSIS — E53.8 FOLIC ACID DEFICIENCY: ICD-10-CM

## 2023-06-23 RX ORDER — FOLIC ACID 1 MG/1
1 TABLET ORAL DAILY
Qty: 90 TABLET | Refills: 3 | Status: SHIPPED | OUTPATIENT
Start: 2023-06-23

## 2023-08-06 RX ORDER — LOSARTAN POTASSIUM AND HYDROCHLOROTHIAZIDE 12.5; 1 MG/1; MG/1
TABLET ORAL
Qty: 100 TABLET | Refills: 2 | Status: SHIPPED | OUTPATIENT
Start: 2023-08-06

## 2023-08-06 RX ORDER — MONTELUKAST SODIUM 10 MG/1
TABLET ORAL
Qty: 100 TABLET | Refills: 2 | Status: SHIPPED | OUTPATIENT
Start: 2023-08-06

## 2023-08-22 RX ORDER — PROPRANOLOL HYDROCHLORIDE 80 MG/1
80 CAPSULE, EXTENDED RELEASE ORAL DAILY
Qty: 100 CAPSULE | Refills: 3 | Status: SHIPPED | OUTPATIENT
Start: 2023-08-22

## 2023-10-05 ENCOUNTER — HOSPITAL ENCOUNTER (OUTPATIENT)
Dept: RADIOLOGY | Facility: HOSPITAL | Age: 73
Discharge: HOME | End: 2023-10-05
Payer: MEDICARE

## 2023-10-05 ENCOUNTER — HOSPITAL ENCOUNTER (INPATIENT)
Facility: HOSPITAL | Age: 73
LOS: 3 days | Discharge: HOME | DRG: 084 | End: 2023-10-08
Attending: STUDENT IN AN ORGANIZED HEALTH CARE EDUCATION/TRAINING PROGRAM | Admitting: INTERNAL MEDICINE
Payer: MEDICARE

## 2023-10-05 ENCOUNTER — APPOINTMENT (OUTPATIENT)
Dept: RADIOLOGY | Facility: HOSPITAL | Age: 73
DRG: 084 | End: 2023-10-05
Payer: MEDICARE

## 2023-10-05 VITALS
OXYGEN SATURATION: 97 % | DIASTOLIC BLOOD PRESSURE: 75 MMHG | RESPIRATION RATE: 20 BRPM | SYSTOLIC BLOOD PRESSURE: 154 MMHG | HEART RATE: 70 BPM

## 2023-10-05 DIAGNOSIS — I62.9 INTRACRANIAL HEMORRHAGE (MULTI): Primary | ICD-10-CM

## 2023-10-05 DIAGNOSIS — S01.81XA LACERATION OF FOREHEAD, INITIAL ENCOUNTER: ICD-10-CM

## 2023-10-05 DIAGNOSIS — E04.2 NONTOXIC MULTINODULAR GOITER: ICD-10-CM

## 2023-10-05 LAB
ALBUMIN SERPL BCP-MCNC: 4.2 G/DL (ref 3.4–5)
ALP SERPL-CCNC: 83 U/L (ref 33–136)
ALT SERPL W P-5'-P-CCNC: 10 U/L (ref 7–45)
ANION GAP SERPL CALC-SCNC: 10 MMOL/L (ref 10–20)
AST SERPL W P-5'-P-CCNC: 14 U/L (ref 9–39)
BILIRUB SERPL-MCNC: 0.9 MG/DL (ref 0–1.2)
BUN SERPL-MCNC: 18 MG/DL (ref 6–23)
CALCIUM SERPL-MCNC: 10.7 MG/DL (ref 8.6–10.3)
CARDIAC TROPONIN I PNL SERPL HS: 7 NG/L (ref 0–13)
CHLORIDE SERPL-SCNC: 104 MMOL/L (ref 98–107)
CO2 SERPL-SCNC: 28 MMOL/L (ref 21–32)
CREAT SERPL-MCNC: 0.62 MG/DL (ref 0.5–1.05)
ERYTHROCYTE [DISTWIDTH] IN BLOOD BY AUTOMATED COUNT: 13.3 % (ref 11.5–14.5)
GFR SERPL CREATININE-BSD FRML MDRD: >90 ML/MIN/1.73M*2
GLUCOSE SERPL-MCNC: 78 MG/DL (ref 74–99)
HCT VFR BLD AUTO: 45.3 % (ref 36–46)
HGB BLD-MCNC: 15.3 G/DL (ref 12–16)
INR PPP: 1 (ref 0.9–1.1)
MAGNESIUM SERPL-MCNC: 1.89 MG/DL (ref 1.6–2.4)
MCH RBC QN AUTO: 29.2 PG (ref 26–34)
MCHC RBC AUTO-ENTMCNC: 33.8 G/DL (ref 32–36)
MCV RBC AUTO: 87 FL (ref 80–100)
NRBC BLD-RTO: 0 /100 WBCS (ref 0–0)
PLATELET # BLD AUTO: 276 X10*3/UL (ref 150–450)
PMV BLD AUTO: 9.1 FL (ref 7.5–11.5)
POTASSIUM SERPL-SCNC: 3.1 MMOL/L (ref 3.5–5.3)
PROT SERPL-MCNC: 6.7 G/DL (ref 6.4–8.2)
PROTHROMBIN TIME: 11.6 SECONDS (ref 9.8–12.8)
RBC # BLD AUTO: 5.24 X10*6/UL (ref 4–5.2)
SODIUM SERPL-SCNC: 139 MMOL/L (ref 136–145)
WBC # BLD AUTO: 11.8 X10*3/UL (ref 4.4–11.3)

## 2023-10-05 PROCEDURE — 10005 FNA BX W/US GDN 1ST LES: CPT

## 2023-10-05 PROCEDURE — 85027 COMPLETE CBC AUTOMATED: CPT | Performed by: STUDENT IN AN ORGANIZED HEALTH CARE EDUCATION/TRAINING PROGRAM

## 2023-10-05 PROCEDURE — 96372 THER/PROPH/DIAG INJ SC/IM: CPT | Performed by: STUDENT IN AN ORGANIZED HEALTH CARE EDUCATION/TRAINING PROGRAM

## 2023-10-05 PROCEDURE — 88173 CYTOPATH EVAL FNA REPORT: CPT | Mod: TC | Performed by: RADIOLOGY

## 2023-10-05 PROCEDURE — 70450 CT HEAD/BRAIN W/O DYE: CPT | Performed by: RADIOLOGY

## 2023-10-05 PROCEDURE — 36415 COLL VENOUS BLD VENIPUNCTURE: CPT | Performed by: STUDENT IN AN ORGANIZED HEALTH CARE EDUCATION/TRAINING PROGRAM

## 2023-10-05 PROCEDURE — 93005 ELECTROCARDIOGRAM TRACING: CPT

## 2023-10-05 PROCEDURE — 0HQ1XZZ REPAIR FACE SKIN, EXTERNAL APPROACH: ICD-10-PCS

## 2023-10-05 PROCEDURE — 88112 CYTOPATH CELL ENHANCE TECH: CPT | Performed by: PATHOLOGY

## 2023-10-05 PROCEDURE — 99291 CRITICAL CARE FIRST HOUR: CPT | Performed by: INTERNAL MEDICINE

## 2023-10-05 PROCEDURE — 76942 ECHO GUIDE FOR BIOPSY: CPT | Performed by: NURSE PRACTITIONER

## 2023-10-05 PROCEDURE — 83735 ASSAY OF MAGNESIUM: CPT

## 2023-10-05 PROCEDURE — 12011 RPR F/E/E/N/L/M 2.5 CM/<: CPT

## 2023-10-05 PROCEDURE — 90471 IMMUNIZATION ADMIN: CPT | Performed by: STUDENT IN AN ORGANIZED HEALTH CARE EDUCATION/TRAINING PROGRAM

## 2023-10-05 PROCEDURE — 12011 RPR F/E/E/N/L/M 2.5 CM/<: CPT | Performed by: STUDENT IN AN ORGANIZED HEALTH CARE EDUCATION/TRAINING PROGRAM

## 2023-10-05 PROCEDURE — 80053 COMPREHEN METABOLIC PANEL: CPT | Performed by: STUDENT IN AN ORGANIZED HEALTH CARE EDUCATION/TRAINING PROGRAM

## 2023-10-05 PROCEDURE — 76377 3D RENDER W/INTRP POSTPROCES: CPT

## 2023-10-05 PROCEDURE — 99285 EMERGENCY DEPT VISIT HI MDM: CPT | Performed by: STUDENT IN AN ORGANIZED HEALTH CARE EDUCATION/TRAINING PROGRAM

## 2023-10-05 PROCEDURE — 85610 PROTHROMBIN TIME: CPT | Performed by: STUDENT IN AN ORGANIZED HEALTH CARE EDUCATION/TRAINING PROGRAM

## 2023-10-05 PROCEDURE — 76377 3D RENDER W/INTRP POSTPROCES: CPT | Performed by: RADIOLOGY

## 2023-10-05 PROCEDURE — 90715 TDAP VACCINE 7 YRS/> IM: CPT | Performed by: STUDENT IN AN ORGANIZED HEALTH CARE EDUCATION/TRAINING PROGRAM

## 2023-10-05 PROCEDURE — 70450 CT HEAD/BRAIN W/O DYE: CPT | Mod: MG

## 2023-10-05 PROCEDURE — 10005 FNA BX W/US GDN 1ST LES: CPT | Performed by: NURSE PRACTITIONER

## 2023-10-05 PROCEDURE — 70486 CT MAXILLOFACIAL W/O DYE: CPT | Performed by: RADIOLOGY

## 2023-10-05 PROCEDURE — 96374 THER/PROPH/DIAG INJ IV PUSH: CPT | Mod: 59

## 2023-10-05 PROCEDURE — 2020000001 HC ICU ROOM DAILY

## 2023-10-05 PROCEDURE — 70486 CT MAXILLOFACIAL W/O DYE: CPT | Mod: MG

## 2023-10-05 PROCEDURE — 72125 CT NECK SPINE W/O DYE: CPT | Mod: ME

## 2023-10-05 PROCEDURE — G1004 CDSM NDSC: HCPCS

## 2023-10-05 PROCEDURE — 2500000004 HC RX 250 GENERAL PHARMACY W/ HCPCS (ALT 636 FOR OP/ED): Performed by: STUDENT IN AN ORGANIZED HEALTH CARE EDUCATION/TRAINING PROGRAM

## 2023-10-05 PROCEDURE — 76942 ECHO GUIDE FOR BIOPSY: CPT

## 2023-10-05 PROCEDURE — 2500000004 HC RX 250 GENERAL PHARMACY W/ HCPCS (ALT 636 FOR OP/ED)

## 2023-10-05 PROCEDURE — 2500000005 HC RX 250 GENERAL PHARMACY W/O HCPCS

## 2023-10-05 PROCEDURE — 88173 CYTOPATH EVAL FNA REPORT: CPT | Performed by: PATHOLOGY

## 2023-10-05 PROCEDURE — 2500000001 HC RX 250 WO HCPCS SELF ADMINISTERED DRUGS (ALT 637 FOR MEDICARE OP)

## 2023-10-05 PROCEDURE — 84484 ASSAY OF TROPONIN QUANT: CPT | Performed by: STUDENT IN AN ORGANIZED HEALTH CARE EDUCATION/TRAINING PROGRAM

## 2023-10-05 PROCEDURE — 72125 CT NECK SPINE W/O DYE: CPT | Performed by: RADIOLOGY

## 2023-10-05 RX ORDER — MAGNESIUM SULFATE HEPTAHYDRATE 40 MG/ML
4 INJECTION, SOLUTION INTRAVENOUS EVERY 6 HOURS PRN
Status: DISCONTINUED | OUTPATIENT
Start: 2023-10-05 | End: 2023-10-08 | Stop reason: HOSPADM

## 2023-10-05 RX ORDER — DICLOFENAC SODIUM 50 MG/1
50 TABLET, DELAYED RELEASE ORAL 2 TIMES DAILY PRN
COMMUNITY

## 2023-10-05 RX ORDER — POTASSIUM CHLORIDE 1.5 G/1.58G
20 POWDER, FOR SOLUTION ORAL EVERY 6 HOURS PRN
Status: DISCONTINUED | OUTPATIENT
Start: 2023-10-05 | End: 2023-10-08 | Stop reason: HOSPADM

## 2023-10-05 RX ORDER — POTASSIUM CHLORIDE 20 MEQ/1
40 TABLET, EXTENDED RELEASE ORAL EVERY 6 HOURS PRN
Status: DISCONTINUED | OUTPATIENT
Start: 2023-10-05 | End: 2023-10-08 | Stop reason: HOSPADM

## 2023-10-05 RX ORDER — POTASSIUM CHLORIDE 20 MEQ/1
20 TABLET, EXTENDED RELEASE ORAL DAILY
Status: DISCONTINUED | OUTPATIENT
Start: 2023-10-06 | End: 2023-10-06

## 2023-10-05 RX ORDER — LORATADINE 10 MG/1
10 TABLET ORAL DAILY
COMMUNITY

## 2023-10-05 RX ORDER — PAROXETINE HYDROCHLORIDE 20 MG/1
30 TABLET, FILM COATED ORAL NIGHTLY
Status: DISCONTINUED | OUTPATIENT
Start: 2023-10-05 | End: 2023-10-08 | Stop reason: HOSPADM

## 2023-10-05 RX ORDER — POTASSIUM CHLORIDE 1.5 G/1.58G
40 POWDER, FOR SOLUTION ORAL EVERY 6 HOURS PRN
Status: DISCONTINUED | OUTPATIENT
Start: 2023-10-05 | End: 2023-10-08 | Stop reason: HOSPADM

## 2023-10-05 RX ORDER — POTASSIUM CHLORIDE 20 MEQ/1
20 TABLET, EXTENDED RELEASE ORAL EVERY 6 HOURS PRN
Status: DISCONTINUED | OUTPATIENT
Start: 2023-10-05 | End: 2023-10-08 | Stop reason: HOSPADM

## 2023-10-05 RX ORDER — LIDOCAINE HYDROCHLORIDE AND EPINEPHRINE 10; 10 MG/ML; UG/ML
10 INJECTION, SOLUTION INFILTRATION; PERINEURAL ONCE
Status: COMPLETED | OUTPATIENT
Start: 2023-10-05 | End: 2023-10-05

## 2023-10-05 RX ORDER — MONTELUKAST SODIUM 10 MG/1
10 TABLET ORAL NIGHTLY
Status: DISCONTINUED | OUTPATIENT
Start: 2023-10-05 | End: 2023-10-08 | Stop reason: HOSPADM

## 2023-10-05 RX ORDER — MONTELUKAST SODIUM 10 MG/1
10 TABLET ORAL NIGHTLY
COMMUNITY

## 2023-10-05 RX ORDER — NICARDIPINE HYDROCHLORIDE 0.2 MG/ML
2.5-15 INJECTION INTRAVENOUS CONTINUOUS
Status: DISCONTINUED | OUTPATIENT
Start: 2023-10-05 | End: 2023-10-06

## 2023-10-05 RX ORDER — METHIMAZOLE 5 MG/1
2.5 TABLET ORAL
COMMUNITY

## 2023-10-05 RX ORDER — ACETAMINOPHEN 325 MG/1
650 TABLET ORAL EVERY 6 HOURS PRN
Status: DISCONTINUED | OUTPATIENT
Start: 2023-10-05 | End: 2023-10-08 | Stop reason: HOSPADM

## 2023-10-05 RX ORDER — FOLIC ACID 1 MG/1
1 TABLET ORAL DAILY
COMMUNITY

## 2023-10-05 RX ORDER — LOSARTAN POTASSIUM AND HYDROCHLOROTHIAZIDE 12.5; 1 MG/1; MG/1
1 TABLET ORAL DAILY
COMMUNITY

## 2023-10-05 RX ORDER — PAROXETINE 30 MG/1
30 TABLET, FILM COATED ORAL NIGHTLY
COMMUNITY

## 2023-10-05 RX ORDER — DICLOFENAC SODIUM 50 MG/1
50 TABLET, DELAYED RELEASE ORAL 2 TIMES DAILY PRN
Status: DISCONTINUED | OUTPATIENT
Start: 2023-10-05 | End: 2023-10-06

## 2023-10-05 RX ORDER — LORATADINE 10 MG/1
10 TABLET ORAL DAILY
Status: DISCONTINUED | OUTPATIENT
Start: 2023-10-06 | End: 2023-10-08 | Stop reason: HOSPADM

## 2023-10-05 RX ORDER — METHIMAZOLE 5 MG/1
2.5 TABLET ORAL
Status: DISCONTINUED | OUTPATIENT
Start: 2023-10-06 | End: 2023-10-08 | Stop reason: HOSPADM

## 2023-10-05 RX ORDER — POTASSIUM CHLORIDE 14.9 MG/ML
20 INJECTION INTRAVENOUS EVERY 6 HOURS PRN
Status: DISCONTINUED | OUTPATIENT
Start: 2023-10-05 | End: 2023-10-08 | Stop reason: HOSPADM

## 2023-10-05 RX ORDER — MAGNESIUM SULFATE HEPTAHYDRATE 40 MG/ML
2 INJECTION, SOLUTION INTRAVENOUS EVERY 6 HOURS PRN
Status: DISCONTINUED | OUTPATIENT
Start: 2023-10-05 | End: 2023-10-08 | Stop reason: HOSPADM

## 2023-10-05 RX ORDER — LIDOCAINE HYDROCHLORIDE AND EPINEPHRINE 10; 10 MG/ML; UG/ML
INJECTION, SOLUTION INFILTRATION; PERINEURAL
Status: COMPLETED
Start: 2023-10-05 | End: 2023-10-05

## 2023-10-05 RX ADMIN — LIDOCAINE HYDROCHLORIDE AND EPINEPHRINE 10 ML: 10; 10 INJECTION, SOLUTION INFILTRATION; PERINEURAL at 17:15

## 2023-10-05 RX ADMIN — TETANUS TOXOID, REDUCED DIPHTHERIA TOXOID AND ACELLULAR PERTUSSIS VACCINE, ADSORBED 0.5 ML: 5; 2.5; 8; 8; 2.5 SUSPENSION INTRAMUSCULAR at 18:01

## 2023-10-05 RX ADMIN — NICARDIPINE HYDROCHLORIDE 5 MG/HR: 0.2 INJECTION, SOLUTION INTRAVENOUS at 19:27

## 2023-10-05 RX ADMIN — PAROXETINE 30 MG: 20 TABLET, FILM COATED ORAL at 23:02

## 2023-10-05 RX ADMIN — ACETAMINOPHEN 325MG 650 MG: 325 TABLET ORAL at 23:02

## 2023-10-05 RX ADMIN — MONTELUKAST 10 MG: 10 TABLET, FILM COATED ORAL at 23:02

## 2023-10-05 SDOH — SOCIAL STABILITY: SOCIAL NETWORK
DO YOU BELONG TO ANY CLUBS OR ORGANIZATIONS SUCH AS CHURCH GROUPS UNIONS, FRATERNAL OR ATHLETIC GROUPS, OR SCHOOL GROUPS?: NO

## 2023-10-05 SDOH — SOCIAL STABILITY: SOCIAL INSECURITY
WITHIN THE LAST YEAR, HAVE TO BEEN RAPED OR FORCED TO HAVE ANY KIND OF SEXUAL ACTIVITY BY YOUR PARTNER OR EX-PARTNER?: NO

## 2023-10-05 SDOH — ECONOMIC STABILITY: HOUSING INSECURITY: IN THE LAST 12 MONTHS, HOW MANY PLACES HAVE YOU LIVED?: 1

## 2023-10-05 SDOH — SOCIAL STABILITY: SOCIAL NETWORK: HOW OFTEN DO YOU ATTENT MEETINGS OF THE CLUB OR ORGANIZATION YOU BELONG TO?: NEVER

## 2023-10-05 SDOH — HEALTH STABILITY: MENTAL HEALTH: HOW MANY DRINKS CONTAINING ALCOHOL DO YOU HAVE ON A TYPICAL DAY WHEN YOU ARE DRINKING?: 1 OR 2

## 2023-10-05 SDOH — SOCIAL STABILITY: SOCIAL NETWORK: HOW OFTEN DO YOU GET TOGETHER WITH FRIENDS OR RELATIVES?: MORE THAN THREE TIMES A WEEK

## 2023-10-05 SDOH — SOCIAL STABILITY: SOCIAL INSECURITY: DOES ANYONE TRY TO KEEP YOU FROM HAVING/CONTACTING OTHER FRIENDS OR DOING THINGS OUTSIDE YOUR HOME?: NO

## 2023-10-05 SDOH — ECONOMIC STABILITY: INCOME INSECURITY: HOW HARD IS IT FOR YOU TO PAY FOR THE VERY BASICS LIKE FOOD, HOUSING, MEDICAL CARE, AND HEATING?: NOT HARD AT ALL

## 2023-10-05 SDOH — SOCIAL STABILITY: SOCIAL NETWORK
IN A TYPICAL WEEK, HOW MANY TIMES DO YOU TALK ON THE PHONE WITH FAMILY, FRIENDS, OR NEIGHBORS?: MORE THAN THREE TIMES A WEEK

## 2023-10-05 SDOH — SOCIAL STABILITY: SOCIAL INSECURITY: ABUSE: ADULT

## 2023-10-05 SDOH — ECONOMIC STABILITY: HOUSING INSECURITY: IN THE LAST 12 MONTHS, WAS THERE A TIME WHEN YOU WERE NOT ABLE TO PAY THE MORTGAGE OR RENT ON TIME?: NO

## 2023-10-05 SDOH — SOCIAL STABILITY: SOCIAL INSECURITY: WERE YOU ABLE TO COMPLETE ALL THE BEHAVIORAL HEALTH SCREENINGS?: YES

## 2023-10-05 SDOH — ECONOMIC STABILITY: FOOD INSECURITY: WITHIN THE PAST 12 MONTHS, YOU WORRIED THAT YOUR FOOD WOULD RUN OUT BEFORE YOU GOT MONEY TO BUY MORE.: NEVER TRUE

## 2023-10-05 SDOH — HEALTH STABILITY: MENTAL HEALTH: HOW OFTEN DO YOU HAVE A DRINK CONTAINING ALCOHOL?: 2-3 TIMES A WEEK

## 2023-10-05 SDOH — SOCIAL STABILITY: SOCIAL INSECURITY
WITHIN THE LAST YEAR, HAVE YOU BEEN KICKED, HIT, SLAPPED, OR OTHERWISE PHYSICALLY HURT BY YOUR PARTNER OR EX-PARTNER?: NO

## 2023-10-05 SDOH — ECONOMIC STABILITY: INCOME INSECURITY: IN THE PAST 12 MONTHS, HAS THE ELECTRIC, GAS, OIL, OR WATER COMPANY THREATENED TO SHUT OFF SERVICE IN YOUR HOME?: NO

## 2023-10-05 SDOH — SOCIAL STABILITY: SOCIAL INSECURITY: DO YOU FEEL ANYONE HAS EXPLOITED OR TAKEN ADVANTAGE OF YOU FINANCIALLY OR OF YOUR PERSONAL PROPERTY?: NO

## 2023-10-05 SDOH — SOCIAL STABILITY: SOCIAL NETWORK: HOW OFTEN DO YOU ATTEND CHURCH OR RELIGIOUS SERVICES?: NEVER

## 2023-10-05 SDOH — SOCIAL STABILITY: SOCIAL INSECURITY: WITHIN THE LAST YEAR, HAVE YOU BEEN HUMILIATED OR EMOTIONALLY ABUSED IN OTHER WAYS BY YOUR PARTNER OR EX-PARTNER?: NO

## 2023-10-05 SDOH — HEALTH STABILITY: MENTAL HEALTH: HOW MANY STANDARD DRINKS CONTAINING ALCOHOL DO YOU HAVE ON A TYPICAL DAY?: 1 OR 2

## 2023-10-05 SDOH — SOCIAL STABILITY: SOCIAL NETWORK: ARE YOU MARRIED, WIDOWED, DIVORCED, SEPARATED, NEVER MARRIED, OR LIVING WITH A PARTNER?: MARRIED

## 2023-10-05 SDOH — ECONOMIC STABILITY: FOOD INSECURITY: WITHIN THE PAST 12 MONTHS, THE FOOD YOU BOUGHT JUST DIDN'T LAST AND YOU DIDN'T HAVE MONEY TO GET MORE.: NEVER TRUE

## 2023-10-05 SDOH — SOCIAL STABILITY: SOCIAL NETWORK
DO YOU BELONG TO ANY CLUBS OR ORGANIZATIONS SUCH AS CHURCH GROUPS, UNIONS, FRATERNAL OR ATHLETIC GROUPS, OR SCHOOL GROUPS?: NO

## 2023-10-05 SDOH — ECONOMIC STABILITY: FOOD INSECURITY: WITHIN THE PAST 12 MONTHS, YOU WORRIED THAT YOUR FOOD WOULD RUN OUT BEFORE YOU GOT THE MONEY TO BUY MORE.: NEVER TRUE

## 2023-10-05 SDOH — HEALTH STABILITY: MENTAL HEALTH
STRESS IS WHEN SOMEONE FEELS TENSE, NERVOUS, ANXIOUS, OR CAN'T SLEEP AT NIGHT BECAUSE THEIR MIND IS TROUBLED. HOW STRESSED ARE YOU?: TO SOME EXTENT

## 2023-10-05 SDOH — SOCIAL STABILITY: SOCIAL INSECURITY: ARE YOU MARRIED, WIDOWED, DIVORCED, SEPARATED, NEVER MARRIED, OR LIVING WITH A PARTNER?: MARRIED

## 2023-10-05 SDOH — HEALTH STABILITY: PHYSICAL HEALTH: ON AVERAGE, HOW MANY MINUTES DO YOU ENGAGE IN EXERCISE AT THIS LEVEL?: 60 MIN

## 2023-10-05 SDOH — ECONOMIC STABILITY: INCOME INSECURITY: IN THE PAST 12 MONTHS HAS THE ELECTRIC, GAS, OIL, OR WATER COMPANY THREATENED TO SHUT OFF SERVICES IN YOUR HOME?: NO

## 2023-10-05 SDOH — ECONOMIC STABILITY: INCOME INSECURITY: IN THE LAST 12 MONTHS, WAS THERE A TIME WHEN YOU WERE NOT ABLE TO PAY THE MORTGAGE OR RENT ON TIME?: NO

## 2023-10-05 SDOH — SOCIAL STABILITY: SOCIAL INSECURITY: ARE THERE ANY APPARENT SIGNS OF INJURIES/BEHAVIORS THAT COULD BE RELATED TO ABUSE/NEGLECT?: NO

## 2023-10-05 SDOH — HEALTH STABILITY: MENTAL HEALTH: HOW OFTEN DO YOU HAVE SIX OR MORE DRINKS ON ONE OCCASION?: NEVER

## 2023-10-05 SDOH — SOCIAL STABILITY: SOCIAL INSECURITY: DO YOU FEEL UNSAFE GOING BACK TO THE PLACE WHERE YOU ARE LIVING?: NO

## 2023-10-05 SDOH — HEALTH STABILITY: MENTAL HEALTH
DO YOU FEEL STRESS - TENSE, RESTLESS, NERVOUS, OR ANXIOUS, OR UNABLE TO SLEEP AT NIGHT BECAUSE YOUR MIND IS TROUBLED ALL THE TIME - THESE DAYS?: TO SOME EXTENT

## 2023-10-05 SDOH — HEALTH STABILITY: MENTAL HEALTH: HOW OFTEN DO YOU HAVE 6 OR MORE DRINKS ON ONE OCCASION?: NEVER

## 2023-10-05 SDOH — SOCIAL STABILITY: SOCIAL INSECURITY: HAVE YOU HAD THOUGHTS OF HARMING ANYONE ELSE?: NO

## 2023-10-05 SDOH — SOCIAL STABILITY: SOCIAL INSECURITY: ARE YOU OR HAVE YOU BEEN THREATENED OR ABUSED PHYSICALLY, EMOTIONALLY, OR SEXUALLY BY ANYONE?: NO

## 2023-10-05 SDOH — SOCIAL STABILITY: SOCIAL INSECURITY: WITHIN THE LAST YEAR, HAVE YOU BEEN AFRAID OF YOUR PARTNER OR EX-PARTNER?: NO

## 2023-10-05 SDOH — SOCIAL STABILITY: SOCIAL INSECURITY: HAS ANYONE EVER THREATENED TO HURT YOUR FAMILY OR YOUR PETS?: NO

## 2023-10-05 SDOH — HEALTH STABILITY: PHYSICAL HEALTH: ON AVERAGE, HOW MANY DAYS PER WEEK DO YOU ENGAGE IN MODERATE TO STRENUOUS EXERCISE (LIKE A BRISK WALK)?: 2 DAYS

## 2023-10-05 SDOH — SOCIAL STABILITY: SOCIAL NETWORK: HOW OFTEN DO YOU ATTEND MEETINGS OF THE CLUBS OR ORGANIZATIONS YOU BELONG TO?: NEVER

## 2023-10-05 SDOH — ECONOMIC STABILITY: FOOD INSECURITY: HOW HARD IS IT FOR YOU TO PAY FOR THE VERY BASICS LIKE FOOD, HOUSING, MEDICAL CARE, AND HEATING?: NOT HARD AT ALL

## 2023-10-05 SDOH — SOCIAL STABILITY: SOCIAL INSECURITY
WITHIN THE LAST YEAR, HAVE YOU BEEN RAPED OR FORCED TO HAVE ANY KIND OF SEXUAL ACTIVITY BY YOUR PARTNER OR EX-PARTNER?: NO

## 2023-10-05 ASSESSMENT — LIFESTYLE VARIABLES
HOW OFTEN DURING THE LAST YEAR HAVE YOU FAILED TO DO WHAT WAS NORMALLY EXPECTED FROM YOU BECAUSE OF DRINKING: NEVER
HAVE YOU EVER FELT YOU SHOULD CUT DOWN ON YOUR DRINKING: NO
HOW OFTEN DURING THE LAST YEAR HAVE YOU NEEDED AN ALCOHOLIC DRINK FIRST THING IN THE MORNING TO GET YOURSELF GOING AFTER A NIGHT OF HEAVY DRINKING: NEVER
HOW OFTEN DO YOU HAVE 6 OR MORE DRINKS ON ONE OCCASION: NEVER
HOW OFTEN DURING THE LAST YEAR HAVE YOU HAD A FEELING OF GUILT OR REMORSE AFTER DRINKING: NEVER
HAVE PEOPLE ANNOYED YOU BY CRITICIZING YOUR DRINKING: NO
HOW MANY STANDARD DRINKS CONTAINING ALCOHOL DO YOU HAVE ON A TYPICAL DAY: 1 OR 2
HOW OFTEN DURING THE LAST YEAR HAVE YOU FOUND THAT YOU WERE NOT ABLE TO STOP DRINKING ONCE YOU HAD STARTED: NEVER
PRESCIPTION_ABUSE_PAST_12_MONTHS: NO
SUBSTANCE_ABUSE_PAST_12_MONTHS: NO
SKIP TO QUESTIONS 9-10: 1
AUDIT-C TOTAL SCORE: 3
HAVE YOU OR SOMEONE ELSE BEEN INJURED AS A RESULT OF YOUR DRINKING: NO
AUDIT TOTAL SCORE: 3
AUDIT-C TOTAL SCORE: 3
AUDIT-C TOTAL SCORE: 3
EVER HAD A DRINK FIRST THING IN THE MORNING TO STEADY YOUR NERVES TO GET RID OF A HANGOVER: NO
HOW OFTEN DO YOU HAVE A DRINK CONTAINING ALCOHOL: 2-3 TIMES A WEEK
HAS A RELATIVE, FRIEND, DOCTOR, OR ANOTHER HEALTH PROFESSIONAL EXPRESSED CONCERN ABOUT YOUR DRINKING OR SUGGESTED YOU CUT DOWN: NO
EVER FELT BAD OR GUILTY ABOUT YOUR DRINKING: NO
SKIP TO QUESTIONS 9-10: 1
HOW OFTEN DURING THE LAST YEAR HAVE YOU BEEN UNABLE TO REMEMBER WHAT HAPPENED THE NIGHT BEFORE BECAUSE YOU HAD BEEN DRINKING: NEVER

## 2023-10-05 ASSESSMENT — PAIN SCALES - GENERAL
PAINLEVEL_OUTOF10: 3
PAINLEVEL_OUTOF10: 0 - NO PAIN
PAINLEVEL_OUTOF10: 7
PAINLEVEL_OUTOF10: 0 - NO PAIN

## 2023-10-05 ASSESSMENT — ENCOUNTER SYMPTOMS
APNEA: 0
SEIZURES: 0
BLOOD IN STOOL: 0
BACK PAIN: 0
SORE THROAT: 0
POLYDIPSIA: 0
AGITATION: 0
CHEST TIGHTNESS: 0
FACIAL ASYMMETRY: 0
DIARRHEA: 0
FACIAL SWELLING: 0
COUGH: 0
CONSTIPATION: 0
DIAPHORESIS: 0
POLYPHAGIA: 0
RHINORRHEA: 0
PHOTOPHOBIA: 0
LIGHT-HEADEDNESS: 0
ABDOMINAL DISTENTION: 0
ACTIVITY CHANGE: 0
FREQUENCY: 0
VOMITING: 0
NAUSEA: 0
COLOR CHANGE: 0
NUMBNESS: 0
HEMATURIA: 0
CONFUSION: 0
DIFFICULTY URINATING: 0
ANAL BLEEDING: 0
SPEECH DIFFICULTY: 0
EYE DISCHARGE: 0
FATIGUE: 0
APPETITE CHANGE: 0
HALLUCINATIONS: 0
PALPITATIONS: 0
CHOKING: 0
SHORTNESS OF BREATH: 0
FEVER: 0
TROUBLE SWALLOWING: 0
ABDOMINAL PAIN: 0
EYE PAIN: 0
DYSURIA: 0

## 2023-10-05 ASSESSMENT — PAIN - FUNCTIONAL ASSESSMENT
PAIN_FUNCTIONAL_ASSESSMENT: 0-10

## 2023-10-05 ASSESSMENT — PATIENT HEALTH QUESTIONNAIRE - PHQ9
SUM OF ALL RESPONSES TO PHQ9 QUESTIONS 1 & 2: 4
1. LITTLE INTEREST OR PLEASURE IN DOING THINGS: MORE THAN HALF THE DAYS
2. FEELING DOWN, DEPRESSED OR HOPELESS: MORE THAN HALF THE DAYS

## 2023-10-05 ASSESSMENT — COGNITIVE AND FUNCTIONAL STATUS - GENERAL
MOBILITY SCORE: 24
DAILY ACTIVITIY SCORE: 24
PATIENT BASELINE BEDBOUND: NO

## 2023-10-05 ASSESSMENT — ACTIVITIES OF DAILY LIVING (ADL)
BATHING: INDEPENDENT
ADEQUATE_TO_COMPLETE_ADL: YES
PATIENT'S MEMORY ADEQUATE TO SAFELY COMPLETE DAILY ACTIVITIES?: YES
DRESSING YOURSELF: INDEPENDENT
HEARING - LEFT EAR: FUNCTIONAL
TOILETING: INDEPENDENT
FEEDING YOURSELF: INDEPENDENT
WALKS IN HOME: INDEPENDENT
GROOMING: INDEPENDENT
HEARING - RIGHT EAR: FUNCTIONAL
LACK_OF_TRANSPORTATION: NO
JUDGMENT_ADEQUATE_SAFELY_COMPLETE_DAILY_ACTIVITIES: YES

## 2023-10-05 ASSESSMENT — COLUMBIA-SUICIDE SEVERITY RATING SCALE - C-SSRS
2. HAVE YOU ACTUALLY HAD ANY THOUGHTS OF KILLING YOURSELF?: NO
1. IN THE PAST MONTH, HAVE YOU WISHED YOU WERE DEAD OR WISHED YOU COULD GO TO SLEEP AND NOT WAKE UP?: NO
6. HAVE YOU EVER DONE ANYTHING, STARTED TO DO ANYTHING, OR PREPARED TO DO ANYTHING TO END YOUR LIFE?: NO

## 2023-10-05 ASSESSMENT — PAIN DESCRIPTION - ORIENTATION: ORIENTATION: LEFT

## 2023-10-05 ASSESSMENT — PAIN DESCRIPTION - LOCATION: LOCATION: HEAD

## 2023-10-05 ASSESSMENT — PAIN DESCRIPTION - DESCRIPTORS: DESCRIPTORS: ACHING

## 2023-10-05 ASSESSMENT — PAIN DESCRIPTION - ONSET: ONSET: SUDDEN

## 2023-10-05 ASSESSMENT — PAIN DESCRIPTION - FREQUENCY: FREQUENCY: CONSTANT/CONTINUOUS

## 2023-10-05 NOTE — H&P
History Of Present Illness  Bruna Hay is a 73 y.o. female presenting with slip and fall with forehead laceration and found to have a small cortical hemorrhagic contusion    This is a 73-year-old female who arrives to the emergency department after having a slip and fall within this facility after having thyroid biopsy performed.  Patient was found to have a small laceration to the forehead which was repaired inside the emergency department.  Head CT showed that she had a small cortical hemorrhagic contusion to the right lateral cerebellar measuring about 4 to 5 mm.  Neurosurgery was consulted by the emergency department, recommended that the patient be admitted to the ICU for every hour neurochecks and repeat head imaging for stability.  Patient does not have any other medical complaints at this time, original head CT performed at approximately 1800 p.m. we will repeat head CT in 6 hours at approximately 0000 hrs. a.m. goal systolic blood pressure less than 160 mmHg per protocol.  Patient was found to have systolic blood pressure of 190 mmHg in the emergency department, Cardene drip was started at that time.    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    Surgical History  She has a past surgical history that includes US guided thyroid biopsy (10/5/2023).     Social History  She reports that she has quit smoking. Her smoking use included cigarettes. She has never used smokeless tobacco. She reports that she does not drink alcohol and does not use  drugs.    Family History  No family history on file.     Allergies  Meperidine, Meperidine (pf), Morphine, Oxycodone-acetaminophen, Oxymetazoline, and Pollen extracts    Review of Systems   Constitutional:  Negative for activity change, appetite change, diaphoresis, fatigue and fever.   HENT:  Negative for congestion, facial swelling, nosebleeds, postnasal drip, rhinorrhea, sore throat and trouble swallowing.    Eyes:  Negative for photophobia, pain, discharge and visual disturbance.   Respiratory:  Negative for apnea, cough, choking, chest tightness and shortness of breath.    Cardiovascular:  Negative for chest pain and palpitations.   Gastrointestinal:  Negative for abdominal distention, abdominal pain, anal bleeding, blood in stool, constipation, diarrhea, nausea and vomiting.   Endocrine: Negative for cold intolerance, heat intolerance, polydipsia, polyphagia and polyuria.   Genitourinary:  Negative for difficulty urinating, dysuria, frequency and hematuria.   Musculoskeletal:  Negative for back pain and gait problem.   Skin:  Negative for color change, pallor and rash.   Neurological:  Negative for seizures, syncope, facial asymmetry, speech difficulty, light-headedness and numbness.   Psychiatric/Behavioral:  Negative for agitation, behavioral problems, confusion, hallucinations and self-injury.         Physical Exam   General: Generally well-appearing 73-year-old female in no acute apparent distress, appears stated age  Head/neck: Trachea midline, no JVD noted, laceration noted to forehead  EENT: Anicteric, mucous membranes moist, acyanotic  Cardio: Regular rate and rhythm, no murmurs, no rubs, no clicks.  Pulses felt distally in all extremities, brisk capillary refill  Respiratory: Vesicular lung sounds were bases to apices bilaterally, equal and symmetric chest rise and fall, no additional work of breathing noted  Abdomen: Soft, nontender, nondistended  Skin: Intact, warm, dry  Neuro: Alert and oriented  "x3.  Cranial nerves intact, no focal deficits.  NIH of 0.  psych: Appropriate mood and behavior,   Last Recorded Vitals  Blood pressure 180/79, pulse (!) 36, temperature 36.3 °C (97.3 °F), temperature source Temporal, resp. rate 16, height 1.6 m (5' 3\"), weight 65.8 kg (145 lb), SpO2 95 %.    Relevant Results        Results for orders placed or performed during the hospital encounter of 10/05/23 (from the past 24 hour(s))   CBC   Result Value Ref Range    WBC 11.8 (H) 4.4 - 11.3 x10*3/uL    nRBC 0.0 0.0 - 0.0 /100 WBCs    RBC 5.24 (H) 4.00 - 5.20 x10*6/uL    Hemoglobin 15.3 12.0 - 16.0 g/dL    Hematocrit 45.3 36.0 - 46.0 %    MCV 87 80 - 100 fL    MCH 29.2 26.0 - 34.0 pg    MCHC 33.8 32.0 - 36.0 g/dL    RDW 13.3 11.5 - 14.5 %    Platelets 276 150 - 450 x10*3/uL    MPV 9.1 7.5 - 11.5 fL   Protime-INR   Result Value Ref Range    Protime 11.6 9.8 - 12.8 seconds    INR 1.0 0.9 - 1.1      No current facility-administered medications on file prior to encounter.     No current outpatient medications on file prior to encounter.     CT head wo IV contrast    Result Date: 10/5/2023  Interpreted By:  Carlos Avina, STUDY: CT HEAD WO IV CONTRAST; CT CERVICAL SPINE WO IV CONTRAST; CT FACIAL BONES WO IV CONTRAST; CT 3D RECONSTRUCTION;  10/5/2023 5:49 pm   INDICATION: fall; Signs/Symptoms:fall; Signs/Symptoms:facial trauma   COMPARISON: None.   ACCESSION NUMBER(S): [id number]; [id number]; [id number]; [id number]   ORDERING CLINICIAN: KATHLEEN TRONCOSO   TECHNIQUE: Axial noncontrast CT images of head with coronal and sagittal reconstructed images. Axial noncontrast CT images of the cervical spine with coronal and sagittal reconstructed images..   Axial CT imaging of the face performed without contrast. Three-dimensional reconstructions performed on a separate workstation   FINDINGS: CT HEAD:   BRAIN PARENCHYMA:  5 mm hyperdense focus seen seen in the right posterior fossa along the right lateral cerebellum in the retromastoid " space.. Reference 202, image 26 this could be a small hemorrhagic contusion versus small focus of extra-axial hemorrhage. Global volume loss and chronic small vessel ischemic change       CT CERVICAL SPINE: Loss of normal cervical lordosis. 2 mm anterolisthesis C3-4. Severe multilevel degenerative disc disease is seen with demineralization. Mottled appearance of the bones again detected. Severe spinal stenosis seen at C4-5, C5-6 and C6-7       CT face soft tissue injury seen in the frontal region. No adjacent skull fracture no air-fluid levels. No evidence of acute facial bone fracture. Mucous retention cysts seen in the right maxillary sinus. No orbital hematoma or evidence of orbital fracture. Zygomas are intact. Incidentally noted is a heterogeneous thyroid gland IMPRESSION:   IMPRESSION: Findings suspicious for a small cortical hemorrhagic contusion seen in the right lateral cerebellum measuring about 4-5 mm. Soft tissue injury seen in the frontal region of the skull. Global volume loss and chronic small vessel ischemic change. No mass effect or midline shift. Senescent change   Severe multilevel degenerative disc disease. No evidence of acute cervical spine fracture. Demineralization   Heterogeneous nodular thyroid gland. Consider nonemergent thyroid ultrasound No evidence of acute facial bone fracture   Carlos Avina discussed the significance and urgency of this critical finding by telephone with  KATHLEEN TRONCOSO on 10/5/2023 at 6:34 pm. (**-RCF-**) Findings:  See findings.       Signed by: Carlos Avina 10/5/2023 6:34 PM Dictation workstation:   [id number]    CT cervical spine wo IV contrast    Result Date: 10/5/2023  Interpreted By:  Carlos Avina, STUDY: CT HEAD WO IV CONTRAST; CT CERVICAL SPINE WO IV CONTRAST; CT FACIAL BONES WO IV CONTRAST; CT 3D RECONSTRUCTION;  10/5/2023 5:49 pm   INDICATION: fall; Signs/Symptoms:fall; Signs/Symptoms:facial trauma   COMPARISON: None.   ACCESSION NUMBER(S): [id number];  [id number]; [id number]; [id number]   ORDERING CLINICIAN: KATHLEEN TRONCOSO   TECHNIQUE: Axial noncontrast CT images of head with coronal and sagittal reconstructed images. Axial noncontrast CT images of the cervical spine with coronal and sagittal reconstructed images..   Axial CT imaging of the face performed without contrast. Three-dimensional reconstructions performed on a separate workstation   FINDINGS: CT HEAD:   BRAIN PARENCHYMA:  5 mm hyperdense focus seen seen in the right posterior fossa along the right lateral cerebellum in the retromastoid space.. Reference 202, image 26 this could be a small hemorrhagic contusion versus small focus of extra-axial hemorrhage. Global volume loss and chronic small vessel ischemic change       CT CERVICAL SPINE: Loss of normal cervical lordosis. 2 mm anterolisthesis C3-4. Severe multilevel degenerative disc disease is seen with demineralization. Mottled appearance of the bones again detected. Severe spinal stenosis seen at C4-5, C5-6 and C6-7       CT face soft tissue injury seen in the frontal region. No adjacent skull fracture no air-fluid levels. No evidence of acute facial bone fracture. Mucous retention cysts seen in the right maxillary sinus. No orbital hematoma or evidence of orbital fracture. Zygomas are intact. Incidentally noted is a heterogeneous thyroid gland IMPRESSION:   IMPRESSION: Findings suspicious for a small cortical hemorrhagic contusion seen in the right lateral cerebellum measuring about 4-5 mm. Soft tissue injury seen in the frontal region of the skull. Global volume loss and chronic small vessel ischemic change. No mass effect or midline shift. Senescent change   Severe multilevel degenerative disc disease. No evidence of acute cervical spine fracture. Demineralization   Heterogeneous nodular thyroid gland. Consider nonemergent thyroid ultrasound No evidence of acute facial bone fracture   Carlos Avina discussed the significance and urgency of  this critical finding by telephone with  KATHLEEN TRONCOSO on 10/5/2023 at 6:34 pm. (**-RCF-**) Findings:  See findings.       Signed by: Carlos Avina 10/5/2023 6:34 PM Dictation workstation:   [id number]    CT maxillofacial bones wo IV contrast    Result Date: 10/5/2023  Interpreted By:  Carlos Avina, STUDY: CT HEAD WO IV CONTRAST; CT CERVICAL SPINE WO IV CONTRAST; CT FACIAL BONES WO IV CONTRAST; CT 3D RECONSTRUCTION;  10/5/2023 5:49 pm   INDICATION: fall; Signs/Symptoms:fall; Signs/Symptoms:facial trauma   COMPARISON: None.   ACCESSION NUMBER(S): [id number]; [id number]; [id number]; [id number]   ORDERING CLINICIAN: KATHLEEN TRONCOSO   TECHNIQUE: Axial noncontrast CT images of head with coronal and sagittal reconstructed images. Axial noncontrast CT images of the cervical spine with coronal and sagittal reconstructed images..   Axial CT imaging of the face performed without contrast. Three-dimensional reconstructions performed on a separate workstation   FINDINGS: CT HEAD:   BRAIN PARENCHYMA:  5 mm hyperdense focus seen seen in the right posterior fossa along the right lateral cerebellum in the retromastoid space.. Reference 202, image 26 this could be a small hemorrhagic contusion versus small focus of extra-axial hemorrhage. Global volume loss and chronic small vessel ischemic change       CT CERVICAL SPINE: Loss of normal cervical lordosis. 2 mm anterolisthesis C3-4. Severe multilevel degenerative disc disease is seen with demineralization. Mottled appearance of the bones again detected. Severe spinal stenosis seen at C4-5, C5-6 and C6-7       CT face soft tissue injury seen in the frontal region. No adjacent skull fracture no air-fluid levels. No evidence of acute facial bone fracture. Mucous retention cysts seen in the right maxillary sinus. No orbital hematoma or evidence of orbital fracture. Zygomas are intact. Incidentally noted is a heterogeneous thyroid gland IMPRESSION:   IMPRESSION: Findings  suspicious for a small cortical hemorrhagic contusion seen in the right lateral cerebellum measuring about 4-5 mm. Soft tissue injury seen in the frontal region of the skull. Global volume loss and chronic small vessel ischemic change. No mass effect or midline shift. Senescent change   Severe multilevel degenerative disc disease. No evidence of acute cervical spine fracture. Demineralization   Heterogeneous nodular thyroid gland. Consider nonemergent thyroid ultrasound No evidence of acute facial bone fracture   Carlos Avina discussed the significance and urgency of this critical finding by telephone with  KATHLEEN TRONCOSO on 10/5/2023 at 6:34 pm. (**-RCF-**) Findings:  See findings.       Signed by: Carlos Avina 10/5/2023 6:34 PM Dictation workstation:   [id number]    CT 3D reconstruction    Result Date: 10/5/2023  Interpreted By:  Carlos Avina, STUDY: CT HEAD WO IV CONTRAST; CT CERVICAL SPINE WO IV CONTRAST; CT FACIAL BONES WO IV CONTRAST; CT 3D RECONSTRUCTION;  10/5/2023 5:49 pm   INDICATION: fall; Signs/Symptoms:fall; Signs/Symptoms:facial trauma   COMPARISON: None.   ACCESSION NUMBER(S): [id number]; [id number]; [id number]; [id number]   ORDERING CLINICIAN: KATHLEEN TRONCOSO   TECHNIQUE: Axial noncontrast CT images of head with coronal and sagittal reconstructed images. Axial noncontrast CT images of the cervical spine with coronal and sagittal reconstructed images..   Axial CT imaging of the face performed without contrast. Three-dimensional reconstructions performed on a separate workstation   FINDINGS: CT HEAD:   BRAIN PARENCHYMA:  5 mm hyperdense focus seen seen in the right posterior fossa along the right lateral cerebellum in the retromastoid space.. Reference 202, image 26 this could be a small hemorrhagic contusion versus small focus of extra-axial hemorrhage. Global volume loss and chronic small vessel ischemic change       CT CERVICAL SPINE: Loss of normal cervical lordosis. 2 mm anterolisthesis  C3-4. Severe multilevel degenerative disc disease is seen with demineralization. Mottled appearance of the bones again detected. Severe spinal stenosis seen at C4-5, C5-6 and C6-7       CT face soft tissue injury seen in the frontal region. No adjacent skull fracture no air-fluid levels. No evidence of acute facial bone fracture. Mucous retention cysts seen in the right maxillary sinus. No orbital hematoma or evidence of orbital fracture. Zygomas are intact. Incidentally noted is a heterogeneous thyroid gland IMPRESSION:   IMPRESSION: Findings suspicious for a small cortical hemorrhagic contusion seen in the right lateral cerebellum measuring about 4-5 mm. Soft tissue injury seen in the frontal region of the skull. Global volume loss and chronic small vessel ischemic change. No mass effect or midline shift. Senescent change   Severe multilevel degenerative disc disease. No evidence of acute cervical spine fracture. Demineralization   Heterogeneous nodular thyroid gland. Consider nonemergent thyroid ultrasound No evidence of acute facial bone fracture   Carlos Avina discussed the significance and urgency of this critical finding by telephone with  KATHLEEN TRONCOSO on 10/5/2023 at 6:34 pm. (**-RCF-**) Findings:  See findings.       Signed by: Carlos Avina 10/5/2023 6:34 PM Dictation workstation:   [id number]    US guided thyroid biopsy    Result Date: 10/5/2023  Interpreted By:  Jimmy Grande, STUDY: US GUIDED THYROID BIOPSY;  10/5/2023 3:10 pm   INDICATION: Signs/Symptoms:US FNA RIGHT THYROID NODULE.   COMPARISON: None.   ACCESSION NUMBER(S): [id number]   ORDERING CLINICIAN: MARY KAY WESTFALL   TECHNIQUE: INTERVENTIONALIST(S): Jimmy Grande CNP   CONSENT: The patient/patient's POA/next of kin was informed of the nature of the proposed procedure. The purposes, alternatives, risks, and benefits were explained and discussed. All questions were answered and consent was obtained.   SEDATION: Lidocaine was  administered for local anesthesia. No IV sedation was given   TIME OUT: A time out was performed immediately prior to procedure start with the interventional team, correctly identifying the patient name, date of birth, MRN, procedure, anatomy (including marking of site and side), patient position, procedure consent form, relevant laboratory and imaging test results, antibiotic administration, safety precautions, and procedure-specific equipment needs.   COMPLICATIONS: No immediate adverse events identified.   FINDINGS: The patient was placed in the supine position with the neck in an extended position. Ultrasound of the thyroid was performed and demonstrated a 1.7cm nodule in the right lobe of thyroid. The nodule in the right lobe of the thyroid was selected for biopsy. The patient was prepped and draped in normal sterile fashion.   Subcutaneous lidocaine was utilized for local anesthesia. Subsequently, three passes were made into the previously mentioned nodule(s) using 25 gauge spinal needles under direct ultrasound guidance. Images document the tip of the needle within the nodule(s). Slides were prepared and sent to pathology for evaluation. There were no immediate complications.       Uneventful ultrasound-guided fine-needle aspiration biopsy of a right lobe thyroid nodule, as detailed above.   Performed and dictated at [facility].   Signed by: Jimmy Grande 10/5/2023 3:40 PM Dictation workstation:   [id number]       Assessment/Plan   Active Problems:  There are no active Hospital Problems.      #Small cortical hemorrhagic contusion right lateral cerebellum measuring about 4-5 mm  #Hypertension          Neurology:  -Patient found to have subdural hematoma on CT scan  -Every hour neurochecks  -Repeat head CT at 00:00 for 6-hour stability check  - Monitor for signs of reversible bleeding  -CAM bundle  -ABCDEF  bundle  -Neurology following the patient  -Appreciate neurology  recommendations  -Continue to monitor patient's neurological status         Cardiology:    -No coronary complaints at this time  -Goal systolic blood pressure less than 160 mmHg due to traumatic cerebellar hemorrhagic contusion  -Patient on Cardene drip in emergency department to maintain systolic blood pressure less than 160 mmHg  -Continue to monitor patient on telemetry    Respiratory:    -Patient is saturating well on room air at this time  -Continue to monitor pulse oximetry and maintain SPO2 greater than 92%      GI:    -Bowel regimen with docusate  -Diet: N.p.o. until cleared for repeat head CT  -Advance diet as appropriate      :    -Creatinine  -No urinary or renal complaints at this time  -Continue to monitor I's and O's  -Continue with daily RFP's    Heme:    -Hemoglobin and hematocrit on admission were 15.3/45.3, no need for transfusion at this time-PT/INR were 11.6/1, within normal limits  -Chemical DVT prophylaxis held due to patient has active hemorrhagic contusion  -SCID's ordered  -Continue to monitor daily CBC, monitor for signs of blood loss or acute anemia    Endo:     -No known endocrinological issues  -ICU electrolyte replacement protocol ordered  -We will continue to monitor electrolyte levels on RFP/CMP    ID:  -Patient's WBC on admission: 11.8  -No signs of infectious disease on admission  -Monitor for changes in WBC or development of infectious processes, will follow cultures and lactate    DVT prop: Patient has a hemorrhagic cerebellar contusion  GI prop: Protonix, bowel regimen with docusate  Diet:.  N.p.o. until cleared with repeat CT of head   lines: PIV    Disposition: Patient to remain in ICU for every 1 hour neurochecks, follow-up with a 6-hour repeat head CT           Ryder Chavez DO

## 2023-10-05 NOTE — PRE-PROCEDURE NOTE
Interventional Radiology Preprocedure Note    Procedure: Right thyroid nodule biopsy    Indication for procedure: Right thyroid nodule      Directed physical examination:    Physical Exam  Neurological:      Mental Status: She is alert and oriented to person, place, and time.         Benefits, risks and alternatives of procedure and planned sedation have been discussed with the patient and/or their representative. All questions answered and they agree to proceed.

## 2023-10-05 NOTE — Clinical Note
Right neck biopsy site remainsw intact with no bleeding or hematoma present. Pt denies pain, nausea or facial numbness/tingling. Discharge handout given. Pt verbalizes understanding of discharge instructions.

## 2023-10-05 NOTE — Clinical Note
Right thyroid nodule biopsy complete. Samples sent to lab. Site without bleeding or hematoma. Band aid and ice pack applied. Pt tolerated well. Denies pain, nausea or facial numbness or tingling. Will continue to monitor.

## 2023-10-05 NOTE — POST-PROCEDURE NOTE
Interventional Radiology Brief Postprocedure Note    Procedure: Right thyroid nodule biopsy    Provider: Jimmy Grande APRN-CNP    Assistant: None    Diagnosis: Right thyroid nodule    Description of procedure: Using ultrasound guidance a total of 3 passes were made with 25 gauge spinal needle to the right thyroid nodule. Scanning after each pass demonstrated no evidence of significant postprocedure bleeding. Diagnostic specimen sent to lab for analysis. Please refer to the full radiology report for further details.         Medications (Filter: Administrations occurring from 1418 to 1449 on 10/05/23)      None            Complications: None    Estimated Blood Loss: minimal        See detailed result report with images in PACS.    The patient tolerated the procedure well without incident or complication and is in stable condition.

## 2023-10-05 NOTE — ED PROVIDER NOTES
HPI   Chief Complaint   Patient presents with    Fall     Pt slipped and fell, laceration to forehead, bleeding controlled       Bruna Hay is a 73-year-old female with a past medical history of toxic nodular goiter s/p thyroid biopsy today 10/5 and balance disorder presents to the ED after a fall.  Patient reports that following her biopsy today, she was walking down the hallway of the hospital when she stumbled and hit the ground face first.  Patient reports that she noticed blood coming from her forehead and began applying pressure immediately.  Reports that she has no other injuries besides her forehead. States that she has had 3-4 falls in the last few years.  She reports all the falls have been mechanical.  Reports that no palpitations or dizziness have preceded any of her falls.  Denies being on blood thinners.  Denies headaches, vision changes, nausea, vomiting, numbness, tingling, weakness.         History provided by:  Patient              [place] Coma Scale Score: 15                  Patient History   Past Medical History:   Diagnosis Date    Difficulty in walking, not elsewhere classified 10/29/2019    Impaired ambulation    Personal history of other specified conditions 10/29/2019    History of balance disorder     Past Surgical History:   Procedure Laterality Date    US GUIDED THYROID BIOPSY  10/5/2023    US GUIDED THYROID BIOPSY 10/5/2023 [facility]      No family history on file.  Social History     Tobacco Use    Smoking status: Former     Types: Cigarettes    Smokeless tobacco: Never   Vaping Use    Vaping Use: Not on file   Substance Use Topics    Alcohol use: Never    Drug use: Never       Physical Exam   ED Triage Vitals [10/05/23 1602]   Temp Heart Rate Resp BP   36.3 °C (97.3 °F) 57 18 (!) 193/90      SpO2 Temp Source Heart Rate Source Patient Position   95 % Temporal Monitor Sitting      BP Location FiO2 (%)     Right leg --       REVIEW OF SYSTEMS    Constitutional:      Denies: fatigue      Reports: None  Neuro:      Denies: Headache, numbness, tingling     Reports: None  HEENT:      Denies: vision change     Reports: None  Cardiovascular:      Denies: Chest pain, palpitations     Reports: None  Pulmonary:      Denies: shortness of breath     Reports: None  Gastrointestinal:      Denies: nausea, vomiting      Reports: None  Musculoskeletal:     Denies: extremity pain, back pain     Reports: None      Physical Exam  Constitutional:       General: She is not in acute distress.     Appearance: Normal appearance. She is normal weight.   HENT:      Head: Normocephalic.      Comments: 2 cm laceration present on midline forehead      Nose: Nose normal. No rhinorrhea.   Eyes:      General:         Right eye: No discharge.         Left eye: No discharge.      Extraocular Movements: Extraocular movements intact.      Conjunctiva/sclera: Conjunctivae normal.   Cardiovascular:      Rate and Rhythm: Normal rate and regular rhythm.      Pulses: Normal pulses.      Heart sounds: Normal heart sounds. No murmur heard.     No gallop.   Pulmonary:      Effort: Pulmonary effort is normal.      Breath sounds: Normal breath sounds. No wheezing, rhonchi or rales.   Abdominal:      General: Abdomen is flat. Bowel sounds are normal.      Palpations: Abdomen is soft.      Tenderness: There is no abdominal tenderness. There is no guarding or rebound.   Musculoskeletal:      Cervical back: Neck supple.      Right lower leg: No edema.      Left lower leg: No edema.   Skin:     General: Skin is warm and dry.      Capillary Refill: Capillary refill takes less than 2 seconds.   Neurological:      General: No focal deficit present.      Mental Status: She is alert and oriented to person, place, and time.      Cranial Nerves: No cranial nerve deficit.      Sensory: No sensory deficit.      Motor: No weakness.      Deep Tendon Reflexes: Reflexes normal.   Psychiatric:         Mood and Affect: Mood normal.         Behavior: Behavior  normal.         ED [facility]   Diagnoses as of 10/05/23 1955   Intracranial hemorrhage (CMS/HCC)       Medical Decision Making  Bruna Hay is a 73-year-old female with a past medical history of toxic nodular goiter s/p thyroid biopsy today 10/5 presents to the ED after a fall.  Upon arrival to ED, patient's blood pressure was 193/90.  Remaining vitals were stable.  Patient was in no acute distress.  We did repair the patient's 2 cm forehead laceration using the procedure below. Patient reports that she has had previous falls in addition to the fall that occurred today. Per protocol, we ordered a head, maxillofacial and cervical spine CT. The patient also received a tetanus shot.     Patient's CT head showed evidence of an intracranial hemorrhage near the right lateral cerebellum measuring 4-5 mm. Soft tissue injury was noted on the front region of the skull which corresponds with where the patient fell. CT cervical spine and maxillofacial showed no fractures.     Due to the the patient's CT head findings, we consulted neurosurgery who recommended admission into the ICU. Nicardipine was given to the patient for blood pressure control and the patient was positioned in a bed with a 45-degree head elevation as a measure to reduce intracranial pressure.  Following nicardipine administration, patient BP decreased to the 130s. Patient was transferred to ICU. At transfer, patient was stable and in no acute distress.       Dispo: Patient was transferred to ICU for continuous management of ICH            Procedure  Laceration Repair    Performed by: Adriana Leonardo MD  Authorized by: Matthew Barajas DO    Consent:     Consent obtained:  Verbal    Consent given by:  Patient    Risks, benefits, and alternatives were discussed: yes      Risks discussed:  Pain, infection and poor cosmetic result    Alternatives discussed:  No treatment  Universal protocol:     Procedure explained and questions answered to patient or  proxy's satisfaction: yes      Relevant documents present and verified: yes      Test results available: yes      Imaging studies available: yes      Required blood products, implants, devices, and special equipment available: no      Site/side marked: yes      Immediately prior to procedure, a time out was called: yes      Patient identity confirmed:  Verbally with patient and arm band  Anesthesia:     Anesthesia method:  Local infiltration    Local anesthetic:  Lidocaine 1% WITH epi  Laceration details:     Location:  Face    Face location:  Forehead    Length (cm):  2    Depth (mm):  3  Exploration:     Hemostasis achieved with:  Direct pressure    Contaminated: no    Treatment:     Area cleansed with:  Povidone-iodine    Amount of cleaning:  Standard    Irrigation solution:  Sterile saline    Irrigation method:  Pressure wash    Visualized foreign bodies/material removed: no      Debridement:  None    Undermining:  None    Scar revision: no    Skin repair:     Repair method:  Sutures    Suture size:  5-0    Suture material:  Nylon    Suture technique:  Simple interrupted    Number of sutures:  3  Approximation:     Approximation:  Close  Repair type:     Repair type:  Simple  Post-procedure details:     Dressing:  Open (no dressing)    Procedure completion:  Tolerated       Adriana Leonardo MD  Resident  10/05/23 6657

## 2023-10-06 LAB
ALBUMIN SERPL BCP-MCNC: 3.8 G/DL (ref 3.4–5)
ALBUMIN SERPL BCP-MCNC: 3.9 G/DL (ref 3.4–5)
ANION GAP SERPL CALC-SCNC: 10 MMOL/L (ref 10–20)
ANION GAP SERPL CALC-SCNC: 13 MMOL/L (ref 10–20)
BUN SERPL-MCNC: 16 MG/DL (ref 6–23)
BUN SERPL-MCNC: 20 MG/DL (ref 6–23)
CALCIUM SERPL-MCNC: 10.5 MG/DL (ref 8.6–10.3)
CALCIUM SERPL-MCNC: 10.7 MG/DL (ref 8.6–10.3)
CHLORIDE SERPL-SCNC: 107 MMOL/L (ref 98–107)
CHLORIDE SERPL-SCNC: 108 MMOL/L (ref 98–107)
CO2 SERPL-SCNC: 24 MMOL/L (ref 21–32)
CO2 SERPL-SCNC: 25 MMOL/L (ref 21–32)
CREAT SERPL-MCNC: 0.56 MG/DL (ref 0.5–1.05)
CREAT SERPL-MCNC: 0.68 MG/DL (ref 0.5–1.05)
ERYTHROCYTE [DISTWIDTH] IN BLOOD BY AUTOMATED COUNT: 13.2 % (ref 11.5–14.5)
GFR SERPL CREATININE-BSD FRML MDRD: >90 ML/MIN/1.73M*2
GFR SERPL CREATININE-BSD FRML MDRD: >90 ML/MIN/1.73M*2
GLUCOSE SERPL-MCNC: 130 MG/DL (ref 74–99)
GLUCOSE SERPL-MCNC: 80 MG/DL (ref 74–99)
HCT VFR BLD AUTO: 42.7 % (ref 36–46)
HGB BLD-MCNC: 14.4 G/DL (ref 12–16)
MAGNESIUM SERPL-MCNC: 2 MG/DL (ref 1.6–2.4)
MAGNESIUM SERPL-MCNC: 2.08 MG/DL (ref 1.6–2.4)
MCH RBC QN AUTO: 29 PG (ref 26–34)
MCHC RBC AUTO-ENTMCNC: 33.7 G/DL (ref 32–36)
MCV RBC AUTO: 86 FL (ref 80–100)
NRBC BLD-RTO: 0 /100 WBCS (ref 0–0)
PHOSPHATE SERPL-MCNC: 2.2 MG/DL (ref 2.5–4.9)
PHOSPHATE SERPL-MCNC: 2.6 MG/DL (ref 2.5–4.9)
PLATELET # BLD AUTO: 246 X10*3/UL (ref 150–450)
PMV BLD AUTO: 9.4 FL (ref 7.5–11.5)
POTASSIUM SERPL-SCNC: 2.9 MMOL/L (ref 3.5–5.3)
POTASSIUM SERPL-SCNC: 4.2 MMOL/L (ref 3.5–5.3)
RBC # BLD AUTO: 4.96 X10*6/UL (ref 4–5.2)
SODIUM SERPL-SCNC: 139 MMOL/L (ref 136–145)
SODIUM SERPL-SCNC: 141 MMOL/L (ref 136–145)
WBC # BLD AUTO: 8.5 X10*3/UL (ref 4.4–11.3)

## 2023-10-06 PROCEDURE — 2500000004 HC RX 250 GENERAL PHARMACY W/ HCPCS (ALT 636 FOR OP/ED)

## 2023-10-06 PROCEDURE — 80069 RENAL FUNCTION PANEL: CPT

## 2023-10-06 PROCEDURE — 36415 COLL VENOUS BLD VENIPUNCTURE: CPT

## 2023-10-06 PROCEDURE — 2020000001 HC ICU ROOM DAILY

## 2023-10-06 PROCEDURE — G0009 ADMIN PNEUMOCOCCAL VACCINE: HCPCS | Performed by: INTERNAL MEDICINE

## 2023-10-06 PROCEDURE — 97165 OT EVAL LOW COMPLEX 30 MIN: CPT | Mod: GO

## 2023-10-06 PROCEDURE — G0008 ADMIN INFLUENZA VIRUS VAC: HCPCS | Performed by: INTERNAL MEDICINE

## 2023-10-06 PROCEDURE — 90662 IIV NO PRSV INCREASED AG IM: CPT | Performed by: INTERNAL MEDICINE

## 2023-10-06 PROCEDURE — 83735 ASSAY OF MAGNESIUM: CPT

## 2023-10-06 PROCEDURE — 2500000004 HC RX 250 GENERAL PHARMACY W/ HCPCS (ALT 636 FOR OP/ED): Performed by: INTERNAL MEDICINE

## 2023-10-06 PROCEDURE — 97161 PT EVAL LOW COMPLEX 20 MIN: CPT | Mod: GP | Performed by: PHYSICAL THERAPIST

## 2023-10-06 PROCEDURE — 99254 IP/OBS CNSLTJ NEW/EST MOD 60: CPT | Performed by: PHYSICIAN ASSISTANT

## 2023-10-06 PROCEDURE — 85027 COMPLETE CBC AUTOMATED: CPT

## 2023-10-06 PROCEDURE — 2500000001 HC RX 250 WO HCPCS SELF ADMINISTERED DRUGS (ALT 637 FOR MEDICARE OP)

## 2023-10-06 PROCEDURE — 3490 HC RX 250 GENERAL PHARMACY W/ HCPCS (ALT 636 FOR OP/ED)

## 2023-10-06 PROCEDURE — 90670 PCV13 VACCINE IM: CPT | Performed by: INTERNAL MEDICINE

## 2023-10-06 PROCEDURE — 99291 CRITICAL CARE FIRST HOUR: CPT | Performed by: INTERNAL MEDICINE

## 2023-10-06 PROCEDURE — 2500000001 HC RX 250 WO HCPCS SELF ADMINISTERED DRUGS (ALT 637 FOR MEDICARE OP): Performed by: INTERNAL MEDICINE

## 2023-10-06 RX ORDER — PROPRANOLOL HYDROCHLORIDE 80 MG/1
80 CAPSULE, EXTENDED RELEASE ORAL DAILY
Status: DISCONTINUED | OUTPATIENT
Start: 2023-10-06 | End: 2023-10-08 | Stop reason: HOSPADM

## 2023-10-06 RX ORDER — SODIUM,POTASSIUM PHOSPHATES 280-250MG
1 POWDER IN PACKET (EA) ORAL 4 TIMES DAILY
Status: DISCONTINUED | OUTPATIENT
Start: 2023-10-06 | End: 2023-10-06

## 2023-10-06 RX ORDER — POTASSIUM CHLORIDE 20 MEQ/1
60 TABLET, EXTENDED RELEASE ORAL ONCE
Status: COMPLETED | OUTPATIENT
Start: 2023-10-06 | End: 2023-10-06

## 2023-10-06 RX ORDER — POTASSIUM CHLORIDE 14.9 MG/ML
20 INJECTION INTRAVENOUS
Status: COMPLETED | OUTPATIENT
Start: 2023-10-06 | End: 2023-10-06

## 2023-10-06 RX ORDER — POTASSIUM CHLORIDE 29.8 MG/ML
40 INJECTION INTRAVENOUS ONCE
Status: DISCONTINUED | OUTPATIENT
Start: 2023-10-06 | End: 2023-10-06

## 2023-10-06 RX ADMIN — POTASSIUM CHLORIDE 20 MEQ: 14.9 INJECTION, SOLUTION INTRAVENOUS at 11:31

## 2023-10-06 RX ADMIN — PROPRANOLOL HYDROCHLORIDE 80 MG: 80 CAPSULE, EXTENDED RELEASE ORAL at 11:54

## 2023-10-06 RX ADMIN — MONTELUKAST 10 MG: 10 TABLET, FILM COATED ORAL at 20:07

## 2023-10-06 RX ADMIN — PNEUMOCOCCAL 13-VALENT CONJUGATE VACCINE 0.5 ML: 2.2; 2.2; 2.2; 2.2; 2.2; 4.4; 2.2; 2.2; 2.2; 2.2; 2.2; 2.2; 2.2 INJECTION, SUSPENSION INTRAMUSCULAR at 11:29

## 2023-10-06 RX ADMIN — INFLUENZA A VIRUS A/VICTORIA/4897/2022 IVR-238 (H1N1) ANTIGEN (FORMALDEHYDE INACTIVATED), INFLUENZA A VIRUS A/DARWIN/9/2021 SAN-010 (H3N2) ANTIGEN (FORMALDEHYDE INACTIVATED), INFLUENZA B VIRUS B/PHUKET/3073/2013 ANTIGEN (FORMALDEHYDE INACTIVATED), AND INFLUENZA B VIRUS B/MICHIGAN/01/2021 ANTIGEN (FORMALDEHYDE INACTIVATED) 0.7 ML: 60; 60; 60; 60 INJECTION, SUSPENSION INTRAMUSCULAR at 11:28

## 2023-10-06 RX ADMIN — POTASSIUM CHLORIDE 20 MEQ: 14.9 INJECTION, SOLUTION INTRAVENOUS at 15:17

## 2023-10-06 RX ADMIN — ACETAMINOPHEN 325MG 650 MG: 325 TABLET ORAL at 20:07

## 2023-10-06 RX ADMIN — POTASSIUM CHLORIDE 60 MEQ: 1500 TABLET, EXTENDED RELEASE ORAL at 08:46

## 2023-10-06 RX ADMIN — LORATADINE 10 MG: 10 TABLET ORAL at 08:46

## 2023-10-06 RX ADMIN — PAROXETINE 30 MG: 20 TABLET, FILM COATED ORAL at 20:07

## 2023-10-06 RX ADMIN — METHIMAZOLE 2.5 MG: 5 TABLET ORAL at 08:46

## 2023-10-06 ASSESSMENT — PAIN - FUNCTIONAL ASSESSMENT
PAIN_FUNCTIONAL_ASSESSMENT: 0-10

## 2023-10-06 ASSESSMENT — PAIN SCALES - GENERAL
PAINLEVEL_OUTOF10: 0 - NO PAIN
PAINLEVEL_OUTOF10: 0 - NO PAIN
PAINLEVEL_OUTOF10: 3
PAINLEVEL_OUTOF10: 0 - NO PAIN
PAINLEVEL_OUTOF10: 3
PAINLEVEL_OUTOF10: 0 - NO PAIN

## 2023-10-06 ASSESSMENT — COGNITIVE AND FUNCTIONAL STATUS - GENERAL
CLIMB 3 TO 5 STEPS WITH RAILING: A LITTLE
MOBILITY SCORE: 19
PERSONAL GROOMING: A LITTLE
DRESSING REGULAR UPPER BODY CLOTHING: A LITTLE
CLIMB 3 TO 5 STEPS WITH RAILING: A LITTLE
DAILY ACTIVITIY SCORE: 19
TOILETING: A LITTLE
MOBILITY SCORE: 19
DRESSING REGULAR LOWER BODY CLOTHING: A LITTLE
DRESSING REGULAR UPPER BODY CLOTHING: A LITTLE
WALKING IN HOSPITAL ROOM: A LITTLE
HELP NEEDED FOR BATHING: A LITTLE
HELP NEEDED FOR BATHING: A LITTLE
PERSONAL GROOMING: A LITTLE
DRESSING REGULAR LOWER BODY CLOTHING: A LITTLE
MOVING TO AND FROM BED TO CHAIR: A LITTLE
TURNING FROM BACK TO SIDE WHILE IN FLAT BAD: A LITTLE
WALKING IN HOSPITAL ROOM: A LITTLE
DAILY ACTIVITIY SCORE: 19
TOILETING: A LITTLE
STANDING UP FROM CHAIR USING ARMS: A LITTLE
MOVING TO AND FROM BED TO CHAIR: A LITTLE
TURNING FROM BACK TO SIDE WHILE IN FLAT BAD: A LITTLE
STANDING UP FROM CHAIR USING ARMS: A LITTLE

## 2023-10-06 ASSESSMENT — ACTIVITIES OF DAILY LIVING (ADL)
ADL_ASSISTANCE: INDEPENDENT
ADL_ASSISTANCE: INDEPENDENT

## 2023-10-06 ASSESSMENT — PAIN DESCRIPTION - DESCRIPTORS: DESCRIPTORS: ACHING

## 2023-10-06 NOTE — PROGRESS NOTES
"Bruna Hay is a 73 y.o. female on day 1 of admission presenting with Intracranial hemorrhage (CMS/HCC).    Subjective   The patient is alert and cooperative. She expresses a desire to go home today, however she has a CT head scan that shows that her bleeding may be progressing. The patient was informed of this result and she agrees to stay as long as possible until the bleeding resolves.     The patient had an ultrasound guided thyroid biopsy that was a separate issue from why she is admitted to the ICU. Ultrasound guided thyroid biopsy was uneventful.        Objective     Physical Exam  Cardiovascular:      Heart sounds: No murmur heard.     No friction rub. No gallop.   Pulmonary:      Effort: No respiratory distress.      Breath sounds: No stridor. No wheezing, rhonchi or rales.   Chest:      Chest wall: No tenderness.   Musculoskeletal:         General: No swelling or tenderness.   Skin:     Coloration: Skin is not jaundiced.   Neurological:      Mental Status: She is alert.      Cranial Nerves: No cranial nerve deficit.       Last Recorded Vitals  Blood pressure 110/68, pulse 72, temperature 35.9 °C (96.6 °F), temperature source Tympanic, resp. rate 21, height 1.6 m (5' 3\"), weight 66.5 kg (146 lb 9.7 oz), SpO2 92 %.  Intake/Output last 3 Shifts:  I/O last 3 completed shifts:  In: 59.8 (0.9 mL/kg) [I.V.:59.8 (0.9 mL/kg)]  Out: 700 (10.5 mL/kg) [Urine:700 (0.3 mL/kg/hr)]  Weight: 66.5 kg     Relevant Results                             Assessment/Plan   Principal Problem:    Intracranial hemorrhage (CMS/HCC)    Active Problems:  The patient's right lateral cerebellum hemorrhage might be progressing on repeat CT scan. A CT scan is ordered for noon tomorrow. If the CT scan shows no progression of bleeding, the patient may be downgraded or discharged home depending on the patients preference.       Neurology:  -Patient found to have subdural hematoma on CT scan  -Every hour neurochecks  -Repeat head CT at " 12:00 noon.   - Monitor for signs of reversible bleeding  -CAM bundle  -ABCDEF  bundle  -Neurology following the patient  -Appreciate neurology recommendations  -Continue to monitor patient's neurological status     Cardiology:   -No coronary complaints at this time  -Goal systolic blood pressure less than 160 mmHg due to traumatic cerebellar hemorrhagic contusion  -Continue to monitor patient on telemetry     Respiratory:   -Patient is saturating well on room air at this time  -Continue to monitor pulse oximetry and maintain SPO2 greater than 92%      GI:  -Bowel regimen with docusate  -Diet: Patient's diet has been moved from n.p.o. to regular.  -Advance diet as appropriate      :   -Creatinine  -No urinary or renal complaints at this time  -Continue to monitor I's and O's  -Continue with daily RFP's     Heme:     -Hemoglobin and hematocrit on admission were 15.3/45.3, no need for transfusion at this time-PT/INR were 11.6/1, within normal limits  -Chemical DVT prophylaxis held due to patient has active hemorrhagic contusion  -SCID's ordered  -Continue to monitor daily CBC, monitor for signs of blood loss or acute anemia     Endo:      -No known endocrinological issues  -ICU electrolyte replacement protocol ordered  -We will continue to monitor electrolyte levels on RFP/CMP     ID:  -Patient's WBC on admission: 11.8  -No signs of infectious disease on admission  -Monitor for changes in WBC or development of infectious processes, will follow cultures and lactate     DVT prop: Patient has a hemorrhagic cerebellar contusion  GI prop: Protonix, bowel regimen with docusate  Diet:. regular diet.   lines: PIV     Disposition: Patient to remain in ICU for every 1 hour neurochecks, follow-up with a repeat CT scan at 12 noon on 10/07/2023           Kash Menezes    I saw and evaluated the patient. I personally obtained the key and critical portions of the history and physical exam or was physically present for key and  critical portions performed by the resident/fellow. I reviewed the resident/fellow's documentation and discussed the patient with the resident/fellow. I agree with the resident/fellow's medical decision making as documented in the note.  A73 yoF  presented with traumatic right lateral cerebellum hemorrhage might be progressing on repeat CT scan.   A CT scan is ordered for noon tomorrow.  Continue q1 neuro checks    Justen Richardson MD

## 2023-10-06 NOTE — CARE PLAN
Problem: Fall/Injury  Goal: Verbalize understanding of personal risk factors for fall in the hospital  Outcome: Progressing  Goal: Verbalize understanding of risk factor reduction measures to prevent injury from fall in the home  Outcome: Progressing     Problem: Acute Head Injury  Goal: Neuro status stable or improved  Outcome: Progressing     Problem: Pain  Goal: My pain/discomfort is manageable  Outcome: Progressing     Problem: Safety  Goal: Patient will be injury free during hospitalization  Outcome: Progressing  Goal: I will remain free of falls  Outcome: Progressing     Problem: Daily Care  Goal: Daily care needs are met  Outcome: Progressing     Problem: Psychosocial Needs  Goal: Demonstrates ability to cope with hospitalization/illness  Outcome: Progressing  Goal: Collaborate with me, my family, and caregiver to identify my specific goals  Outcome: Progressing     Problem: Discharge Barriers  Goal: My discharge needs are met  Outcome: Progressing     Problem: Patient is at risk for or has history of falls  Goal: Patient will not sustain a fall as a result of home safety mitigation  Outcome: Progressing     Problem: Fall/Injury  Goal: Not fall by end of shift  Outcome: Met  Goal: Be free from injury by end of the shift  Outcome: Met  Goal: Pace activities to prevent fatigue by end of the shift  Outcome: Met     Problem: Pain  Goal: Takes deep breaths with improved pain control throughout the shift  Outcome: Met  Goal: Turns in bed with improved pain control throughout the shift  Outcome: Met  Goal: Performs ADL's with improved pain control throughout shift  Outcome: Met     Problem: Acute Head Injury  Goal: Stabilization of hemodynamic status  Outcome: Met   The patient's goals for the shift include to not fall, to be able to rest    The clinical goals for the shift include to not fall and remain hemodynamically stable    Over the shift, the patient did not make progress toward the following goals.  Barriers to progression include acuteness of illness. Recommendations to address these barriers include continue with treatment plan and ordered tests.

## 2023-10-06 NOTE — NURSING NOTE
Patient arrived to ICU bed 2124 on monitor in stable condition at 21:45 on 10/5/23. Completed admission and head-to-toe and neuro assessments at bedside. Oriented patient to unit and educated regarding high falls risk, hourly rounding, and use of call light. Dual RN skin check verified with JOSEFA Brink. Cardene infusion rate verified at 2.5 mg/hr.

## 2023-10-06 NOTE — CONSULTS
Department of Neurological Surgery  Consult Note      Referral Diagnosis:   1. Intracranial hemorrhage (CMS/HCC)        2. Laceration of forehead, initial encounter  Laceration Repair    Laceration Repair       Intracranial hemorrhage (CMS/HCC) [I62.9]  Consulting Physician: Dr. Barajas     History Of Present Illness  Bruna Hay is a 73 y.o. female presenting following mechanical fall with forehead impact.  Patient states to emergency department staff and myself that she is walking out of her thyroid biopsy her shoe stuck to the floor little bit leading her to slip and stumbled forward impacting the front of her head.  Bleeding letter to Emergency Department where CT head identified right lateral cerebellar hyperintensity approximately 5 mm neurosurgery placed on consult.  Recommended transfer to ICU for every hour neurochecks and repeat head CT.  Admitted this morning patient, alert, and cooperative.  Patient denies headache, vision changes, shortness of breath, chest pain, abdominal pain, numbness or tingling in her arms or legs, bowel or bladder changes.      Past Medical History  Past Medical History:   Diagnosis Date    Difficulty in walking, not elsewhere classified 10/29/2019    Impaired ambulation    Personal history of other specified conditions 10/29/2019    History of balance disorder   Hypothyroidism, hypertension    Surgical History  Past Surgical History:   Procedure Laterality Date    US GUIDED THYROID BIOPSY  10/5/2023    [facility]       Social History  Social History     Tobacco Use    Smoking status: Former     Types: Cigarettes    Smokeless tobacco: Never   Substance Use Topics    Alcohol use: Never    Drug use: Never       Allergies  Meperidine, Meperidine (pf), Morphine, Oxycodone-acetaminophen, Oxymetazoline, and Pollen extracts  Medications Prior to Admission   Medication Sig Dispense Refill Last Dose    diclofenac (Voltaren) 50 mg EC tablet Take 1 tablet  "(50 mg) by mouth 2 times a day as needed (pain). Do not crush, chew, or split.   Past Month    folic acid (Folvite) 1 mg tablet Take 1 tablet (1 mg) by mouth once daily.   10/5/2023 at am    loratadine (Claritin) 10 mg tablet Take 1 tablet (10 mg) by mouth once daily.   10/5/2023 at am    losartan-hydrochlorothiazide (Hyzaar) 100-12.5 mg tablet Take 1 tablet by mouth once daily.   10/5/2023 at am    methIMAzole (Tapazole) 5 mg tablet Take 0.5 tablets (2.5 mg) by mouth 5 times a week. Monday through Friday   10/5/2023 at am    montelukast (Singulair) 10 mg tablet Take 1 tablet (10 mg) by mouth once daily at bedtime.   10/4/2023    PARoxetine (Paxil) 30 mg tablet Take 1 tablet (30 mg) by mouth once daily at bedtime.   10/4/2023    propranolol XL (Innopran XL) 80 mg 24 hr capsule Take 1 capsule (80 mg) by mouth once daily at bedtime. Do not crush, chew, or split.   10/4/2023       Review of Systems  14/14 systems reviewed and negative other than what is listed in the history of present illness    Physical Exam    General: Well developed, awake/alert/oriented x3, no distress, alert and cooperative  Skin: Warm and dry, no lesions, no rashes  ENMT: Mucous membranes moist, no apparent injury, no lesions seen  Head/Neck: Neck Supple, no apparent injury  Respiratory/Thorax: Normal breath sounds with good chest expansion, thorax symmetric  Cardiovascular: No pitting edema, no JVD  Cranial nerves II through XI grossly intact  Motor Strength: 5/5 Throughout all extremities    Muscle Bulk: Normal and symmetric in all extremities    Posture:   -- Cervical: Normal  -- Thoracic: Normal  -- Lumbar : Normal  Paraspinal muscle spasm/tenderness absent.     Sensation: intact to light touch  No drift, no dysmetria finger-nose    Last Recorded Vitals  Blood pressure 110/68, pulse 72, temperature 35.9 °C (96.6 °F), temperature source Tympanic, resp. rate 21, height 1.6 m (5' 3\"), weight 66.5 kg (146 lb 9.7 oz), SpO2 92 %.    Relevant " Results  Scheduled medications  influenza, 0.7 mL, intramuscular, During hospitalization  loratadine, 10 mg, oral, Daily  methIMAzole, 2.5 mg, oral, Once per day on Mon Tue Wed Thu Fri  montelukast, 10 mg, oral, Nightly  PARoxetine, 30 mg, oral, Nightly  pneumococcal conjugate, 0.5 mL, intramuscular, During hospitalization  potassium chloride CR, 60 mEq, oral, Once      Continuous medications  niCARdipine, 2.5-15 mg/hr, Last Rate: Stopped (10/06/23 0000)      PRN medications  PRN medications: acetaminophen, diclofenac, magnesium sulfate, magnesium sulfate, potassium chloride CR **OR** potassium chloride, potassium chloride CR **OR** potassium chloride, potassium chloride    Results for orders placed or performed during the hospital encounter of 10/05/23 (from the past 96 hour(s))   Troponin I, High Sensitivity   Result Value Ref Range    Troponin I, High Sensitivity 7 0 - 13 ng/L   CBC   Result Value Ref Range    WBC 11.8 (H) 4.4 - 11.3 x10*3/uL    nRBC 0.0 0.0 - 0.0 /100 WBCs    RBC 5.24 (H) 4.00 - 5.20 x10*6/uL    Hemoglobin 15.3 12.0 - 16.0 g/dL    Hematocrit 45.3 36.0 - 46.0 %    MCV 87 80 - 100 fL    MCH 29.2 26.0 - 34.0 pg    MCHC 33.8 32.0 - 36.0 g/dL    RDW 13.3 11.5 - 14.5 %    Platelets 276 150 - 450 x10*3/uL    MPV 9.1 7.5 - 11.5 fL   Comprehensive metabolic panel   Result Value Ref Range    Glucose 78 74 - 99 mg/dL    Sodium 139 136 - 145 mmol/L    Potassium 3.1 (L) 3.5 - 5.3 mmol/L    Chloride 104 98 - 107 mmol/L    Bicarbonate 28 21 - 32 mmol/L    Anion Gap 10 10 - 20 mmol/L    Urea Nitrogen 18 6 - 23 mg/dL    Creatinine 0.62 0.50 - 1.05 mg/dL    eGFR >90 >60 mL/min/1.73m*2    Calcium 10.7 (H) 8.6 - 10.3 mg/dL    Albumin 4.2 3.4 - 5.0 g/dL    Alkaline Phosphatase 83 33 - 136 U/L    Total Protein 6.7 6.4 - 8.2 g/dL    AST 14 9 - 39 U/L    Bilirubin, Total 0.9 0.0 - 1.2 mg/dL    ALT 10 7 - 45 U/L   Protime-INR   Result Value Ref Range    Protime 11.6 9.8 - 12.8 seconds    INR 1.0 0.9 - 1.1   Magnesium    Result Value Ref Range    Magnesium 1.89 1.60 - 2.40 mg/dL   CBC   Result Value Ref Range    WBC 8.5 4.4 - 11.3 x10*3/uL    nRBC 0.0 0.0 - 0.0 /100 WBCs    RBC 4.96 4.00 - 5.20 x10*6/uL    Hemoglobin 14.4 12.0 - 16.0 g/dL    Hematocrit 42.7 36.0 - 46.0 %    MCV 86 80 - 100 fL    MCH 29.0 26.0 - 34.0 pg    MCHC 33.7 32.0 - 36.0 g/dL    RDW 13.2 11.5 - 14.5 %    Platelets 246 150 - 450 x10*3/uL    MPV 9.4 7.5 - 11.5 fL         Intake/Output Summary (Last 24 hours) at 10/6/2023 0728  Last data filed at 10/6/2023 0600  Gross per 24 hour   Intake 59.79 ml   Output 700 ml   Net -640.21 ml        I have personally reviewed and interpreted the following imaging right cerebellar hyperintensity near cortical edge.  Stable on repeat head CT    Assessment and Plan     Assessment/Plan     Principal Problem:    Intracranial hemorrhage (CMS/HCC)      Acute traumatic intracranial hemorrhage versus cortical contusion  Repeat head CT awaiting radiology read, though no significant change on personal review  Recommend continue frequent neurochecks  Maintain SBP less than 160  Hold blood thinners including ASA/NSAIDs  If stable may downgrade to stepdown or discharge is medically recommended  And repeat head CT in 2 weeks outpatient

## 2023-10-06 NOTE — PROGRESS NOTES
Physical Therapy    Physical Therapy    Physical Therapy Evaluation & Treatment    Patient Name: Bruna Hay  MRN: [id number]  Today's Date: 10/6/2023   Time Calculation  Start Time: 1217  Stop Time: 1238  Time Calculation (min): 21 min    Assessment/Plan   PT Assessment  PT Assessment Results: Impaired balance, Decreased coordination, Decreased safety awareness  Rehab Prognosis: Good  Evaluation/Treatment Tolerance: Patient tolerated treatment well  Medical Staff Made Aware: Yes  Barriers to Participation:  (none)  End of Session Communication: Bedside nurse  End of Session Patient Position: Bed, 3 rail up, Alarm on  IP OR SWING BED PT PLAN  Inpatient or Swing Bed: Inpatient  PT Plan  Treatment/Interventions: Bed mobility, Transfer training, Gait training, Stair training, Balance training, Neuromuscular re-education, Therapeutic exercise, Therapeutic activity  PT Plan: Skilled PT  PT Frequency: 5 times per week  PT Discharge Recommendations: Low intensity level of continued care  Equipment Recommended upon Discharge: Wheeled walker  PT Recommended Transfer Status: Stand by assist    Current Problem:  1. Intracranial hemorrhage (CMS/HCC)        2. Laceration of forehead, initial encounter  Laceration Repair    Laceration Repair        Past Medical History:   Diagnosis Date    Difficulty in walking, not elsewhere classified 10/29/2019    Impaired ambulation    Personal history of other specified conditions 10/29/2019    History of balance disorder     Past Surgical History:   Procedure Laterality Date    US GUIDED THYROID BIOPSY  10/5/2023    US GUIDED THYROID BIOPSY 10/5/2023 [facility] US       Subjective     General Visit Information:  General  Reason for Referral: gait training  Referred By: Karthik  Past Medical History Relevant to Rehab: Pt adm s/p fall. CT: small cortical hemorrhagic contusion R cerebellar region. PMH: hypothyroidism, HTN, L lower lung lobectomy with mets removal  Family/Caregiver Present:  Yes  Prior to Session Communication: Bedside nurse  Patient Position Received: Bed, 3 rail up, Alarm on  Preferred Learning Style: verbal  General Comment: Spouse and dtr at bedside    Home Living:  Home Living  Type of Home: House  Lives With: Spouse  Home Adaptive Equipment: Other (Comment) (RW, BSC)  Home Layout: Multi-level, Other (Comment) (split level)  Home Access: Stairs to enter without rails  Entrance Stairs-Rails: None  Entrance Stairs-Number of Steps: 2  Bathroom Shower/Tub: Tub/shower unit    Prior Level of Function:  Prior Function Per Pt/Caregiver Report  Level of [place]: Independent with ADLs and functional transfers  ADL Assistance: Independent  Ambulatory Assistance: Independent  Vocational:  (reports several falls in past 6 months)    Precautions:       Vital Signs:     Objective     Pain:  Pain Assessment  Pain Assessment:  (Pt reports pain at LUE IV site; RN aware)    Cognition:  Cognition  Orientation Level: Oriented X4  Safety/Judgement:  (reluctant to use RW)    General Assessments:  General Observation  General Observation: cues needed for safety; pt somewhat distracted during gait assessment      Sensation  Light Touch: No apparent deficits        Coordination  Movements are Fluid and Coordinated: Yes     Static Sitting Balance  Static Sitting-Comment/Number of Minutes: WFL  Dynamic Sitting Balance  Dynamic Sitting-Comments: WFL  Static Standing Balance  Static Standing-Comment/Number of Minutes: WFL  Dynamic Standing Balance  Dynamic Standing-Comments: Fair-    Functional Assessments:     Bed Mobility  Bed Mobility: Yes  Bed Mobility 1  Bed Mobility 1:  (sup<>sit)  Level of Assistance 1:  (supervision)  Transfers  Transfer: Yes  Transfer 1  Transfer From 1:  (sit<>stand)  Transfer Device 1: Gait belt  Transfer Level of Assistance 1:  (stand-by)  Ambulation/Gait Training  Ambulation/Gait Training Performed: Yes  Ambulation/Gait Training 1  Surface 1: Level tile  Device 1: No  device  Gait Support Devices: Gait belt  Assistance 1: Contact guard  Quality of Gait 1:  (narrow NITA with occ scissoring; lateral sway; BLE in ER throughout gait cycle)  Comments/Distance (ft) 1: 80  Ambulation/Gait Training 2  Surface 2: Level tile  Device 2: Rolling walker  Gait Support Devices: Gait belt  Assistance 2:  (standby)  Quality of Gait 2:  (still with narrow NITA but less scissoring and decreased lateraly sway)          Extremity/Trunk Assessments:  RUE   RUE : Exceptions to WFL (WFL for purposes of PT eval)  LUE   LUE:  (WFL for purposes of PT eval)  RLE   RLE :  (4+/5)  LLE   LLE :  (4+/5)                        Outcome Measures:  [facility] Basic Mobility  Turning from your back to your side while in a flat bed without using bedrails: None  Moving from lying on your back to sitting on the side of a flat bed without using bedrails: A little  Moving to and from bed to chair (including a wheelchair): A little  Standing up from a chair using your arms (e.g. wheelchair or bedside chair): A little  To walk in hospital room: A little  Climbing 3-5 steps with railing: A little  Basic Mobility - Total Score: 19  Encounter Problems       Encounter Problems (Active)       Balance       Dynamic Standing balance (Progressing)       Start:  10/06/23    Expected End:  10/20/23       Pt will demonstrate Fair+ dynamic standing balance to improve safety with AMB            Mobility       STG - Patient will ambulate (Progressing)       Start:  10/06/23    Expected End:  10/20/23       Pt will  ft with RW, mod I         STG - Patient will ambulate up and down a curb/step (Progressing)       Start:  10/06/23    Expected End:  10/20/23       Pt will ascend/descend 1 curb step with SBA in order to safely access house            Transfers       STG - Patient to transfer to and from sit to supine (Progressing)       Start:  10/06/23    Expected End:  10/20/23       Pt will transfer sit<>sup independently         STG -  Patient will transfer sit to and from stand (Progressing)       Start:  10/06/23    Expected End:  10/20/23       Pt will transfer sit<>stand independently              Education Documentation  Mobility Training, taught by America Galeana PT at 10/6/2023  1:37 PM.  Learner: Family, Significant Other, Patient  Readiness: Acceptance  Method: Explanation  Response: Verbalizes Understanding, Needs Reinforcement  Comment: pt reluctant to use RW but appears to understand education    Education Comments  No comments found.

## 2023-10-06 NOTE — CARE PLAN
Problem: Dressings Lower Extremities  Goal: STG - Patient to complete lower body dressing  Outcome: Progressing     Problem: Grooming  Goal: STG - Patient completes grooming  Outcome: Progressing  Goal: STG - Patient will tolerate standing  Outcome: Progressing     Problem: Toileting  Goal: STG - Patient will complete toileting tasks with  Outcome: Progressing     Problem: Transfers  Goal: STG - Patient will perform toilet transfer  Outcome: Progressing

## 2023-10-06 NOTE — CARE PLAN
Problem: Balance  Goal: Dynamic Standing balance  Description: Pt will demonstrate Fair+ dynamic standing balance to improve safety with AMB  Outcome: Progressing     Problem: Mobility  Goal: STG - Patient will ambulate  Description: Pt will  ft with RW, mod I  Outcome: Progressing  Goal: STG - Patient will ambulate up and down a curb/step  Description: Pt will ascend/descend 1 curb step with SBA in order to safely access house  Outcome: Progressing     Problem: Transfers  Goal: STG - Patient to transfer to and from sit to supine  Description: Pt will transfer sit<>sup independently  Outcome: Progressing  Goal: STG - Patient will transfer sit to and from stand  Description: Pt will transfer sit<>stand independently  Outcome: Progressing

## 2023-10-06 NOTE — PROGRESS NOTES
10.06.2023 Care Transition  Met with patient to confirm information. From home with spouse. Both retired. 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 PCP Dr. AJ Maya. Presented after fall in hospital lobby after procedure. Sustained head injury, Neuro following. Per patient discharge plan to return home when medically cleared.     Katerin Stephen RN

## 2023-10-06 NOTE — PROGRESS NOTES
Occupational Therapy    Occupational Therapy    Evaluation/Treatment    Patient Name: Bruna Hay  MRN: [id number]  : 1950  Today's Date: 10/06/23  Time Calculation  Start Time: 1221  Stop Time: 1237  Time Calculation (min): 16 min       Assessment:  OT Assessment: Pt. benefits from low intensity therapy to increase safety and independence in ADLs and mobillity to return to PLOF. Benefits from use of ww  Prognosis: Good  Evaluation/Treatment Tolerance: Patient tolerated treatment well  OT Assessment Results: Decreased ADL status, Decreased safe judgment during ADL, Decreased functional mobility  Prognosis: Good  Evaluation/Treatment Tolerance: Patient tolerated treatment well  Strengths: Ability to acquire knowledge, Coping skills, Capable of completing ADLs semi/independent, Attitude of self, Support of Caregivers    Plan:  Treatment Interventions: Visual perceptual retraining, Functional transfer training  OT Frequency: 5 times per week  OT Discharge Recommendations: Low intensity level of continued care  Equipment Recommended upon Discharge: Wheeled walker  OT Recommended Transfer Status: Stand by assist  Treatment Interventions: Visual perceptual retraining, Functional transfer training  Subjective     Current Problem:  1. Intracranial hemorrhage (CMS/HCC)        2. Laceration of forehead, initial encounter  Laceration Repair    Laceration Repair        Past Medical History:   Diagnosis Date    Difficulty in walking, not elsewhere classified 10/29/2019    Impaired ambulation    Personal history of other specified conditions 10/29/2019    History of balance disorder     Past Surgical History:   Procedure Laterality Date    US GUIDED THYROID BIOPSY  10/5/2023    US GUIDED THYROID BIOPSY 10/5/2023 [facility]       General:   OT Received On: 10/06/23  General  Reason for Referral: ADLs, discharge planning  Referred By: Dr. Richardson  Past Medical History Relevant to Rehab: Bruna Hay is a 73 y.o. female  presenting following mechanical fall with forehead impact.  Patient states to emergency department staff and myself that she is walking out of her thyroid biopsy her shoe stuck to the floor little bit leading her to slip and stumbled forward impacting the front of her head.  Bleeding letter to Emergency Department where CT head identified right lateral cerebellar hyperintensity approximately 5 mm neurosurgery placed on consult.  Recommended transfer to ICU for every hour neurochecks and repeat head CT.  Admitted this morning patient, alert, and cooperative.  Patient denies headache, vision changes, shortness of breath, chest pain, abdominal pain, numbness or tingling in her arms or legs, bowel or bladder changes.  Family/Caregiver Present: Yes (spouse and daughter)  Prior to Session Communication: Bedside nurse  Patient Position Received: Bed, 3 rail up, Alarm on  Preferred Learning Style: verbal    Precautions:  Medical Precautions: Fall precautions    Vital Signs:  Heart Rate: 78  Heart Rate Source: Monitor  SpO2: 95 %  BP: 157/74  BP Location: Right arm  BP Method: Automatic  Patient Position: Lying    Pain:  Pain Assessment  Pain Score: 0 - No pain  Objective     Cognition:  Orientation Level: Oriented X4  Attention: Within Functional Limits  Memory: Within Funtional Limits  Safety/Judgement:  (slightly impulsive; takes hands off ww and not wanting to use ww)             Home Living:  Home Living Comments:  (Pt. lives with spouse in home with 2 entry steps with bedroom and bathroom on second floor with tub shower. Has walk in shower in basement. Owns ww and BSC. PLOF independent, drives)    Prior Function:  Level of [place]: Independent with ADLs and functional transfers  ADL Assistance: Independent  Homemaking Assistance: Independent  Ambulatory Assistance: Independent    IADL History:       ADL's  Grooming Assistance: Modified independent (Device)  LE Dressing Assistance: Stand by    Activities of Daily  Living: Activity Tolerance:  Endurance: Endurance does not limit participation in activity      Bed Mobility/Transfers: Bed Mobility 1  Bed Mobility Comments 1:  (supine<-> sit SUP)  Transfers  Transfer:  (sit<-> stand SUP. Pt. completes functional mobiliy without ww CGA with scissoring gait. Improves to SBA with ww)       Sensation:  Light Touch: No apparent deficits    Strength:  Strength Comments: wfl    Coordination:  Movements are Fluid and Coordinated: Yes     Hand Function:  Hand Function  Gross Grasp: Functional  Coordination: Functional    Extremities: RUE   RUE : Within Functional Limits and LUE   LUE: Within Functional Limits    Outcome Measures: [facility] Daily Activity  Putting on and taking off regular lower body clothing: A little  Bathing (including washing, rinsing, drying): A little  Putting on and taking off regular upper body clothing: A little  Toileting, which includes using toilet, bedpan or urinal: A little  Taking care of personal grooming such as brushing teeth: A little  Eating Meals: None  Daily Activity - Total Score: 19  Education Documentation  No documentation found.  Education Comments  No comments found.        EDUCATION:  Education  Individual(s) Educated: Patient, Spouse, Child  Education Provided: Fall precautons, Risk and benefits of OT discussed with patient or other, POC discussed and agreed upon (use of ww)  Patient/Caregiver Demonstrated Understanding: yes  Patient Response to Education: Patient/Caregiver Verbalized Understanding of Information    Goals:  Problem: Dressings Lower Extremities  Goal: STG - Patient to complete lower body dressing  Outcome: Progressing     Problem: Grooming  Goal: STG - Patient completes grooming  Outcome: Progressing  Goal: STG - Patient will tolerate standing  Outcome: Progressing     Problem: Toileting  Goal: STG - Patient will complete toileting tasks with  Outcome: Progressing     Problem: Transfers  Goal: STG - Patient will perform toilet  transfer  Outcome: Progressing

## 2023-10-07 ENCOUNTER — APPOINTMENT (OUTPATIENT)
Dept: RADIOLOGY | Facility: HOSPITAL | Age: 73
DRG: 084 | End: 2023-10-07
Payer: MEDICARE

## 2023-10-07 LAB
ALBUMIN SERPL BCP-MCNC: 3.5 G/DL (ref 3.4–5)
ANION GAP SERPL CALC-SCNC: 11 MMOL/L (ref 10–20)
BUN SERPL-MCNC: 19 MG/DL (ref 6–23)
CALCIUM SERPL-MCNC: 10.2 MG/DL (ref 8.6–10.3)
CHLORIDE SERPL-SCNC: 110 MMOL/L (ref 98–107)
CO2 SERPL-SCNC: 21 MMOL/L (ref 21–32)
CREAT SERPL-MCNC: 0.57 MG/DL (ref 0.5–1.05)
ERYTHROCYTE [DISTWIDTH] IN BLOOD BY AUTOMATED COUNT: 13.4 % (ref 11.5–14.5)
GFR SERPL CREATININE-BSD FRML MDRD: >90 ML/MIN/1.73M*2
GLUCOSE SERPL-MCNC: 91 MG/DL (ref 74–99)
HCT VFR BLD AUTO: 42.3 % (ref 36–46)
HGB BLD-MCNC: 13.6 G/DL (ref 12–16)
MAGNESIUM SERPL-MCNC: 2.34 MG/DL (ref 1.6–2.4)
MCH RBC QN AUTO: 29.1 PG (ref 26–34)
MCHC RBC AUTO-ENTMCNC: 32.2 G/DL (ref 32–36)
MCV RBC AUTO: 90 FL (ref 80–100)
NRBC BLD-RTO: 0 /100 WBCS (ref 0–0)
PHOSPHATE SERPL-MCNC: 3 MG/DL (ref 2.5–4.9)
PLATELET # BLD AUTO: 228 X10*3/UL (ref 150–450)
PMV BLD AUTO: 9.8 FL (ref 7.5–11.5)
POTASSIUM SERPL-SCNC: 4.4 MMOL/L (ref 3.5–5.3)
RBC # BLD AUTO: 4.68 X10*6/UL (ref 4–5.2)
SODIUM SERPL-SCNC: 138 MMOL/L (ref 136–145)
WBC # BLD AUTO: 9.6 X10*3/UL (ref 4.4–11.3)

## 2023-10-07 PROCEDURE — 2500000001 HC RX 250 WO HCPCS SELF ADMINISTERED DRUGS (ALT 637 FOR MEDICARE OP): Performed by: INTERNAL MEDICINE

## 2023-10-07 PROCEDURE — 70450 CT HEAD/BRAIN W/O DYE: CPT | Performed by: RADIOLOGY

## 2023-10-07 PROCEDURE — 36415 COLL VENOUS BLD VENIPUNCTURE: CPT

## 2023-10-07 PROCEDURE — 2020000001 HC ICU ROOM DAILY

## 2023-10-07 PROCEDURE — 2500000001 HC RX 250 WO HCPCS SELF ADMINISTERED DRUGS (ALT 637 FOR MEDICARE OP)

## 2023-10-07 PROCEDURE — 70450 CT HEAD/BRAIN W/O DYE: CPT | Mod: MG

## 2023-10-07 PROCEDURE — 3490 HC RX 250 GENERAL PHARMACY W/ HCPCS (ALT 636 FOR OP/ED)

## 2023-10-07 PROCEDURE — 2500000004 HC RX 250 GENERAL PHARMACY W/ HCPCS (ALT 636 FOR OP/ED)

## 2023-10-07 PROCEDURE — 80069 RENAL FUNCTION PANEL: CPT

## 2023-10-07 PROCEDURE — 99291 CRITICAL CARE FIRST HOUR: CPT | Performed by: INTERNAL MEDICINE

## 2023-10-07 PROCEDURE — G1004 CDSM NDSC: HCPCS

## 2023-10-07 PROCEDURE — 83735 ASSAY OF MAGNESIUM: CPT

## 2023-10-07 PROCEDURE — 85027 COMPLETE CBC AUTOMATED: CPT

## 2023-10-07 RX ADMIN — PAROXETINE 30 MG: 20 TABLET, FILM COATED ORAL at 20:09

## 2023-10-07 RX ADMIN — Medication 250 MG: at 00:00

## 2023-10-07 RX ADMIN — Medication 250 MG: at 08:07

## 2023-10-07 RX ADMIN — LORATADINE 10 MG: 10 TABLET ORAL at 08:07

## 2023-10-07 RX ADMIN — ACETAMINOPHEN 325MG 650 MG: 325 TABLET ORAL at 20:09

## 2023-10-07 RX ADMIN — MONTELUKAST 10 MG: 10 TABLET, FILM COATED ORAL at 20:09

## 2023-10-07 RX ADMIN — PROPRANOLOL HYDROCHLORIDE 80 MG: 80 CAPSULE, EXTENDED RELEASE ORAL at 08:07

## 2023-10-07 ASSESSMENT — COGNITIVE AND FUNCTIONAL STATUS - GENERAL
DRESSING REGULAR LOWER BODY CLOTHING: A LITTLE
HELP NEEDED FOR BATHING: A LITTLE
DAILY ACTIVITIY SCORE: 21
TURNING FROM BACK TO SIDE WHILE IN FLAT BAD: A LITTLE
CLIMB 3 TO 5 STEPS WITH RAILING: A LITTLE
MOVING TO AND FROM BED TO CHAIR: A LITTLE
DAILY ACTIVITIY SCORE: 24
STANDING UP FROM CHAIR USING ARMS: A LITTLE
MOBILITY SCORE: 19
MOBILITY SCORE: 24
TOILETING: A LITTLE
WALKING IN HOSPITAL ROOM: A LITTLE

## 2023-10-07 ASSESSMENT — PAIN SCALES - GENERAL
PAINLEVEL_OUTOF10: 0 - NO PAIN
PAINLEVEL_OUTOF10: 0 - NO PAIN
PAINLEVEL_OUTOF10: 5 - MODERATE PAIN
PAINLEVEL_OUTOF10: 0 - NO PAIN

## 2023-10-07 ASSESSMENT — PAIN - FUNCTIONAL ASSESSMENT
PAIN_FUNCTIONAL_ASSESSMENT: 0-10

## 2023-10-07 NOTE — PROGRESS NOTES
"Bruna Hay is a 73 y.o. female on day 2 of admission presenting with Intracranial hemorrhage (CMS/HCC).    Subjective   The patient is feeling well this morning. The has a strong will to be independent and prefers to get up by herself and use the restroom. She has a wheeled walker by her side but prefers not to use it. The patient has had better sleep the past night than the night before. The patient is eating well.        Objective     Physical Exam  Constitutional:       Appearance: Normal appearance.   HENT:      Head: Normocephalic and atraumatic.   Eyes:      Conjunctiva/sclera: Conjunctivae normal.   Cardiovascular:      Rate and Rhythm: Normal rate.   Pulmonary:      Breath sounds: Normal breath sounds.   Musculoskeletal:         General: Normal range of motion.      Cervical back: Normal range of motion and neck supple.   Neurological:      Mental Status: She is alert.         Last Recorded Vitals  Blood pressure 107/54, pulse 62, temperature 36.4 °C (97.5 °F), resp. rate (!) 28, height 1.6 m (5' 3\"), weight 65.9 kg (145 lb 4.5 oz), SpO2 95 %.  Intake/Output last 3 Shifts:  I/O last 3 completed shifts:  In: 269.6 (4.1 mL/kg) [I.V.:69.6 (1.1 mL/kg); IV Piggyback:200]  Out: 700 (10.6 mL/kg) [Urine:700 (0.3 mL/kg/hr)]  Weight: 65.9 kg     Relevant Results                             Assessment/Plan   Principal Problem:    Intracranial hemorrhage (CMS/HCC)    Principal Problem:    Intracranial hemorrhage (CMS/HCC)     Active Problems:  The patient's right lateral cerebellum hemorrhage might be progressing on repeat CT scan. A CT scan is ordered for noon tomorrow. If the CT scan shows no progression of bleeding, the patient may be downgraded or discharged home depending on the patients preference.       Neurology:  -Patient found to have subdural hematoma on CT scan  -Q4 neurochecks  -Repeat head CT finished at 12:25 10/07/2023. Please follow-up with the official read which is still pending.  -If the " patient's repeat head CT is cleared of progressive bleeding, it is the patient's preference whether to be discharged home or be downgraded.   -If discharged the patient will follow-up with neurosurgery outpatient in 2 weeks.   -Monitor for signs of reversible bleeding  -CAM bundle  -ABCDEF  bundle  -Neurology following the patient  -Appreciate neurology recommendations  -Continue to monitor patient's neurological status     Cardiology:  -No coronary complaints at this time  -Goal systolic blood pressure less than 160 mmHg due to traumatic cerebellar hemorrhagic contusion  -Continue to monitor patient on telemetry     Respiratory:   -Patient is saturating well on room air at this time  -Continue to monitor pulse oximetry and maintain SPO2 greater than 92%      GI:  -Bowel regimen with docusate  -Diet: Regular.      :   -Creatinine  -No urinary or renal complaints at this time  -Continue to monitor I's and O's  -Continue with daily RFP's     Heme:     -Chemical DVT prophylaxis held due to patient has active hemorrhagic contusion  -SCID's ordered  -Continue to monitor daily CBC, monitor for signs of blood loss or acute anemia     Endo:      -Thyroid biopsy was performed on the same day the patient fell.   -We will continue to monitor electrolyte levels on RFP/CMP     ID:  -Patient's WBC on admission: 11.8  -No signs of infectious disease on admission  -Monitor for changes in WBC or development of infectious processes, will follow cultures and lactate     DVT prop: Patient has a hemorrhagic cerebellar contusion  GI prop: Protonix, bowel regimen with docusate  Diet:. regular diet.   lines: PIV     Disposition: Patient placed on q4 neurochecks and awaiting final CT report.                  Kash Menezes    I saw and evaluated the patient. I personally obtained the key and critical portions of the history and physical exam or was physically present for key and critical portions performed by  the resident/fellow. I reviewed the resident/fellow's documentation and discussed the patient with the resident/fellow. I agree with the resident/fellow's medical decision making as documented in the note.  A73 yoF  presented with traumatic right lateral cerebellum hemorrhage might be progressing on repeat CT scan.   A repeat CT scan 10/7/2023: pending report  Continue q1 neuro checks     Justen Richardson MD

## 2023-10-07 NOTE — CARE PLAN
Problem: Grooming  Goal: STG - Patient completes grooming  Outcome: Progressing     Problem: Grooming  Goal: STG - Patient will tolerate standing  Outcome: Progressing     Problem: Toileting  Goal: STG - Patient will complete toileting tasks with  Outcome: Progressing     Problem: Safety  Goal: Patient will be injury free during hospitalization  Outcome: Progressing       The patient's goals for the shift include to not fall, to be able to rest    The clinical goals for the shift include remain free from falls this shift    Over the shift, the patient did make progress toward the following goals. Barriers to continued progression include acuteness of illness. Recommendations to address these barriers include continue with education and treatment plan.

## 2023-10-08 VITALS
SYSTOLIC BLOOD PRESSURE: 114 MMHG | WEIGHT: 147.27 LBS | HEIGHT: 63 IN | OXYGEN SATURATION: 95 % | TEMPERATURE: 98.6 F | HEART RATE: 62 BPM | RESPIRATION RATE: 51 BRPM | BODY MASS INDEX: 26.09 KG/M2 | DIASTOLIC BLOOD PRESSURE: 70 MMHG

## 2023-10-08 PROBLEM — I62.9 INTRACRANIAL HEMORRHAGE (MULTI): Status: RESOLVED | Noted: 2023-10-05 | Resolved: 2023-10-08

## 2023-10-08 PROBLEM — S01.81XA FOREHEAD LACERATION: Status: RESOLVED | Noted: 2023-10-05 | Resolved: 2023-10-08

## 2023-10-08 LAB
ALBUMIN SERPL BCP-MCNC: 3.2 G/DL (ref 3.4–5)
ANION GAP SERPL CALC-SCNC: 12 MMOL/L (ref 10–20)
BUN SERPL-MCNC: 20 MG/DL (ref 6–23)
CALCIUM SERPL-MCNC: 9.8 MG/DL (ref 8.6–10.3)
CHLORIDE SERPL-SCNC: 112 MMOL/L (ref 98–107)
CO2 SERPL-SCNC: 19 MMOL/L (ref 21–32)
CREAT SERPL-MCNC: 0.59 MG/DL (ref 0.5–1.05)
ERYTHROCYTE [DISTWIDTH] IN BLOOD BY AUTOMATED COUNT: 13.2 % (ref 11.5–14.5)
GFR SERPL CREATININE-BSD FRML MDRD: >90 ML/MIN/1.73M*2
GLUCOSE SERPL-MCNC: 86 MG/DL (ref 74–99)
HCT VFR BLD AUTO: 43 % (ref 36–46)
HGB BLD-MCNC: 13.7 G/DL (ref 12–16)
MAGNESIUM SERPL-MCNC: 2.34 MG/DL (ref 1.6–2.4)
MCH RBC QN AUTO: 29.3 PG (ref 26–34)
MCHC RBC AUTO-ENTMCNC: 31.9 G/DL (ref 32–36)
MCV RBC AUTO: 92 FL (ref 80–100)
NRBC BLD-RTO: 0 /100 WBCS (ref 0–0)
PHOSPHATE SERPL-MCNC: 2.9 MG/DL (ref 2.5–4.9)
PLATELET # BLD AUTO: 214 X10*3/UL (ref 150–450)
PMV BLD AUTO: 9.1 FL (ref 7.5–11.5)
POTASSIUM SERPL-SCNC: 4.1 MMOL/L (ref 3.5–5.3)
RBC # BLD AUTO: 4.68 X10*6/UL (ref 4–5.2)
SODIUM SERPL-SCNC: 139 MMOL/L (ref 136–145)
WBC # BLD AUTO: 8.2 X10*3/UL (ref 4.4–11.3)

## 2023-10-08 PROCEDURE — 2500000004 HC RX 250 GENERAL PHARMACY W/ HCPCS (ALT 636 FOR OP/ED)

## 2023-10-08 PROCEDURE — 2500000001 HC RX 250 WO HCPCS SELF ADMINISTERED DRUGS (ALT 637 FOR MEDICARE OP): Performed by: INTERNAL MEDICINE

## 2023-10-08 PROCEDURE — 36415 COLL VENOUS BLD VENIPUNCTURE: CPT

## 2023-10-08 PROCEDURE — 85027 COMPLETE CBC AUTOMATED: CPT

## 2023-10-08 PROCEDURE — 3490 HC RX 250 GENERAL PHARMACY W/ HCPCS (ALT 636 FOR OP/ED)

## 2023-10-08 PROCEDURE — 83735 ASSAY OF MAGNESIUM: CPT

## 2023-10-08 PROCEDURE — 80069 RENAL FUNCTION PANEL: CPT

## 2023-10-08 RX ADMIN — PROPRANOLOL HYDROCHLORIDE 80 MG: 80 CAPSULE, EXTENDED RELEASE ORAL at 08:38

## 2023-10-08 RX ADMIN — LORATADINE 10 MG: 10 TABLET ORAL at 08:38

## 2023-10-08 ASSESSMENT — COGNITIVE AND FUNCTIONAL STATUS - GENERAL
DAILY ACTIVITIY SCORE: 24
MOBILITY SCORE: 24

## 2023-10-08 ASSESSMENT — PAIN - FUNCTIONAL ASSESSMENT
PAIN_FUNCTIONAL_ASSESSMENT: 0-10

## 2023-10-08 ASSESSMENT — PAIN SCALES - GENERAL
PAINLEVEL_OUTOF10: 0 - NO PAIN

## 2023-10-08 NOTE — CARE PLAN
Problem: Safety  Goal: Patient will be injury free during hospitalization  Outcome: Progressing     Problem: Pain  Goal: Takes deep breaths with improved pain control throughout the shift  Outcome: Met  Goal: Turns in bed with improved pain control throughout the shift  Outcome: Met  Goal: Walks with improved pain control throughout the shift  Outcome: Met  Goal: Performs ADL's with improved pain control throughout shift  Outcome: Met  Goal: Participates in PT with improved pain control throughout the shift  Outcome: Met  Goal: Free from opioid side effects throughout the shift  Outcome: Met  Goal: Free from acute confusion related to pain meds throughout the shift  Outcome: Met   The patient's goals for the shift include to not fall, to be able to rest    The clinical goals for the shift include remain free from falls this shift    Over the shift, the patient did not make progress toward the following goals. Barriers to progression include lack of willingness to learn. Recommendations to address these barriers include re-education patient at later time.

## 2023-10-08 NOTE — DISCHARGE SUMMARY
Discharge Diagnosis  Intracranial hemorrhage (CMS/HCC)    Issues Requiring Follow-Up  The patient will follow up with Philip RAO from neurosurgery within 2 weeks of discharge from the ICU.    Test Results Pending At Discharge  Pending Labs       No current pending labs.            Hospital Course   The patient was seen at [facility] for a thyroid biopsy. The patient then fell and lacerated her head upon discharge. A head CT scan showed that she had a small cerebellar hemorrhage. Therefore she was admitted to the ICU for Q1 neurochecks and repeat head CT. Repeat CT scan showed that the bleeding might have spread so she remained in the ICU until a repeat head CT shows no progression of bleeding. The patient has been doing well throughout her stay in the ICU.    Pertinent Physical Exam At Time of Discharge  Physical Exam    Home Medications     Medication List      CONTINUE taking these medications     diclofenac 50 mg EC tablet; Commonly known as: Voltaren   folic acid 1 mg tablet; Commonly known as: Folvite   loratadine 10 mg tablet; Commonly known as: Claritin   losartan-hydrochlorothiazide 100-12.5 mg tablet; Commonly known as:   Hyzaar   methIMAzole 5 mg tablet; Commonly known as: Tapazole   montelukast 10 mg tablet; Commonly known as: Singulair   PARoxetine 30 mg tablet; Commonly known as: Paxil   propranolol XL 80 mg 24 hr capsule; Commonly known as: Innopran XL       Outpatient Follow-Up  Future Appointments   Date Time Provider Department Center   6/18/2024  1:30 PM Carlos Michael MD [id number] [place]       Kash Menezes

## 2023-10-08 NOTE — PROGRESS NOTES
Met with daughter to provide [facility] list for hospice. 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 Her father was [facility] when he passed 9 years ago. She is only child and appears somewhat overwhelmed. Mother suffered large stroke. No other family in town.Sent chat message to Marquita Alanis as this writer is off tomorrow.     Katerin Stephen RN

## 2023-10-10 LAB
LABORATORY COMMENT REPORT: NORMAL
LABORATORY COMMENT REPORT: NORMAL
PATH REPORT.FINAL DX SPEC: NORMAL
PATH REPORT.GROSS SPEC: NORMAL
PATH REPORT.RELEVANT HX SPEC: NORMAL
PATH REPORT.TOTAL CANCER: NORMAL

## 2023-10-16 ENCOUNTER — HOSPITAL ENCOUNTER (OUTPATIENT)
Dept: CARDIOLOGY | Facility: HOSPITAL | Age: 73
Discharge: HOME | End: 2023-10-16
Payer: MEDICARE

## 2023-10-16 LAB
ATRIAL RATE: 61 BPM
P AXIS: 50 DEGREES
P OFFSET: 165 MS
P ONSET: 111 MS
PR INTERVAL: 198 MS
Q ONSET: 210 MS
QRS COUNT: 11 BEATS
QRS DURATION: 88 MS
QT INTERVAL: 414 MS
QTC CALCULATION(BAZETT): 416 MS
QTC FREDERICIA: 416 MS
R AXIS: -55 DEGREES
T AXIS: 43 DEGREES
T OFFSET: 417 MS
VENTRICULAR RATE: 61 BPM

## 2023-10-16 PROCEDURE — 93005 ELECTROCARDIOGRAM TRACING: CPT

## 2023-10-25 ENCOUNTER — APPOINTMENT (OUTPATIENT)
Dept: RADIOLOGY | Facility: HOSPITAL | Age: 73
End: 2023-10-25
Payer: MEDICARE

## 2023-10-26 ENCOUNTER — HOSPITAL ENCOUNTER (OUTPATIENT)
Dept: RADIOLOGY | Facility: HOSPITAL | Age: 73
Discharge: HOME | End: 2023-10-26
Payer: MEDICARE

## 2023-10-26 DIAGNOSIS — E05.10 THYROTOXICOSIS WITH TOXIC SINGLE THYROID NODULE WITHOUT THYROTOXIC CRISIS OR STORM: ICD-10-CM

## 2023-10-26 PROCEDURE — 79005 NUCLEAR RX ORAL ADMIN: CPT

## 2023-10-26 PROCEDURE — 79005 NUCLEAR RX ORAL ADMIN: CPT | Performed by: STUDENT IN AN ORGANIZED HEALTH CARE EDUCATION/TRAINING PROGRAM

## 2023-10-26 PROCEDURE — A9517 I131 IODIDE CAP, RX: HCPCS

## 2023-10-26 PROCEDURE — 3430000001 HC RX 343 DIAGNOSTIC RADIOPHARMACEUTICALS

## 2023-10-27 ENCOUNTER — APPOINTMENT (OUTPATIENT)
Dept: RADIOLOGY | Facility: HOSPITAL | Age: 73
End: 2023-10-27
Payer: MEDICARE

## 2023-10-27 RX ADMIN — SODIUM IODIDE I 131 20.6 MILLICURIE: 100 CAPSULE ORAL at 14:00

## 2023-10-30 RX ORDER — SODIUM IODIDE I 131 100 MCI/1
20.6 CAPSULE ORAL
Status: COMPLETED | OUTPATIENT
Start: 2023-10-26 | End: 2023-10-27

## 2023-11-08 ENCOUNTER — HOSPITAL ENCOUNTER (OUTPATIENT)
Dept: RADIOLOGY | Facility: HOSPITAL | Age: 73
Discharge: HOME | End: 2023-11-08
Payer: MEDICARE

## 2023-11-08 DIAGNOSIS — I61.4: ICD-10-CM

## 2023-11-08 PROCEDURE — 70450 CT HEAD/BRAIN W/O DYE: CPT

## 2023-11-08 PROCEDURE — 70450 CT HEAD/BRAIN W/O DYE: CPT | Performed by: STUDENT IN AN ORGANIZED HEALTH CARE EDUCATION/TRAINING PROGRAM

## 2023-11-20 PROBLEM — Z87.898 PERSONAL HISTORY OF OTHER SPECIFIED CONDITIONS: Status: ACTIVE | Noted: 2023-11-20

## 2023-12-22 DIAGNOSIS — F41.9 ANXIETY: ICD-10-CM

## 2023-12-22 DIAGNOSIS — F32.A DEPRESSIVE DISORDER: ICD-10-CM

## 2023-12-26 RX ORDER — PAROXETINE 30 MG/1
30 TABLET, FILM COATED ORAL NIGHTLY
Qty: 90 TABLET | Refills: 2 | Status: SHIPPED | OUTPATIENT
Start: 2023-12-26

## 2024-02-13 RX ORDER — SODIUM IODIDE I 131 100 MCI/1
CAPSULE ORAL
OUTPATIENT
Start: 2024-02-13

## 2024-02-19 PROBLEM — R00.2 PALPITATIONS: Status: ACTIVE | Noted: 2020-03-12

## 2024-02-19 PROBLEM — M62.81 GENERALIZED MUSCLE WEAKNESS: Status: ACTIVE | Noted: 2024-02-19

## 2024-02-19 PROBLEM — R27.0 ATAXIA: Status: ACTIVE | Noted: 2019-06-05

## 2024-02-19 PROBLEM — E87.1 HYPONATREMIA: Status: ACTIVE | Noted: 2019-09-07

## 2024-02-19 PROBLEM — M25.559 GREATER TROCHANTERIC PAIN SYNDROME: Status: ACTIVE | Noted: 2017-12-19

## 2024-02-19 PROBLEM — M47.817 LUMBOSACRAL SPONDYLOSIS WITHOUT MYELOPATHY: Status: ACTIVE | Noted: 2021-08-25

## 2024-02-19 PROBLEM — M54.50 LOW BACK PAIN, UNSPECIFIED: Status: ACTIVE | Noted: 2024-02-19

## 2024-02-19 PROBLEM — E05.20 GOITER, TOXIC, MULTINODULAR: Status: ACTIVE | Noted: 2018-11-23

## 2024-02-19 PROBLEM — J40 BRONCHITIS: Status: ACTIVE | Noted: 2019-05-24

## 2024-02-19 PROBLEM — G56.02 CARPAL TUNNEL SYNDROME OF LEFT WRIST: Status: ACTIVE | Noted: 2018-07-06

## 2024-02-19 PROBLEM — K40.20 BILATERAL INGUINAL HERNIA: Status: ACTIVE | Noted: 2017-07-13

## 2024-02-19 PROBLEM — R26.81 UNSTEADY GAIT: Status: ACTIVE | Noted: 2024-02-19

## 2024-02-19 PROBLEM — E05.90 HYPERTHYROIDISM: Status: ACTIVE | Noted: 2024-02-19

## 2024-02-19 PROBLEM — F33.0 MAJOR DEPRESSIVE DISORDER, RECURRENT, MILD (CMS-HCC): Status: ACTIVE | Noted: 2023-03-23

## 2024-02-19 PROBLEM — M54.2 NECK PAIN: Status: ACTIVE | Noted: 2021-08-23

## 2024-02-19 PROBLEM — M48.07 LUMBOSACRAL STENOSIS: Status: ACTIVE | Noted: 2022-07-12

## 2024-02-19 PROBLEM — E87.0 HYPERNATREMIA: Status: ACTIVE | Noted: 2019-09-07

## 2024-02-19 PROBLEM — I10 BENIGN ESSENTIAL HYPERTENSION: Status: ACTIVE | Noted: 2024-02-19

## 2024-02-19 PROBLEM — M46.1 SACROILIITIS, NOT ELSEWHERE CLASSIFIED (CMS-HCC): Status: ACTIVE | Noted: 2022-03-08

## 2024-02-19 PROBLEM — M47.816 LUMBAR SPONDYLOSIS: Status: ACTIVE | Noted: 2021-12-13

## 2024-02-19 PROBLEM — M54.16 LUMBAR RADICULOPATHY: Status: ACTIVE | Noted: 2021-10-27

## 2024-02-19 PROBLEM — M54.6 PAIN IN THORACIC SPINE: Status: ACTIVE | Noted: 2021-08-23

## 2024-02-19 PROBLEM — R29.6 RECURRENT FALLS: Status: ACTIVE | Noted: 2019-06-05

## 2024-02-19 PROBLEM — E83.52 HYPERCALCEMIA: Status: ACTIVE | Noted: 2019-09-07

## 2024-02-19 PROBLEM — R29.6 REPEATED FALLS: Status: ACTIVE | Noted: 2024-02-19

## 2024-02-19 PROBLEM — M16.10 PRIMARY LOCALIZED OSTEOARTHRITIS OF PELVIC REGION AND THIGH: Status: ACTIVE | Noted: 2019-08-01

## 2024-02-19 RX ORDER — PANTOPRAZOLE SODIUM 40 MG/1
40 TABLET, DELAYED RELEASE ORAL
COMMUNITY
Start: 2013-11-06

## 2024-02-19 RX ORDER — HYDROGEN PEROXIDE 3 %
20 SOLUTION, NON-ORAL MISCELLANEOUS EVERY 24 HOURS
COMMUNITY
Start: 2020-06-05

## 2024-02-19 RX ORDER — AZITHROMYCIN 250 MG/1
250 TABLET, FILM COATED ORAL
COMMUNITY
Start: 2014-01-08

## 2024-02-19 RX ORDER — NAPROXEN 500 MG/1
500 TABLET ORAL EVERY 12 HOURS PRN
COMMUNITY
Start: 2014-09-17

## 2024-02-19 RX ORDER — ACETAMINOPHEN 500 MG
1 TABLET ORAL EVERY 24 HOURS
COMMUNITY

## 2024-02-19 RX ORDER — NEBIVOLOL 10 MG/1
10 TABLET ORAL
COMMUNITY
Start: 2013-06-24

## 2024-02-19 RX ORDER — VENLAFAXINE HYDROCHLORIDE 75 MG/1
75 CAPSULE, EXTENDED RELEASE ORAL
COMMUNITY
Start: 2014-09-17

## 2024-02-19 RX ORDER — ESOMEPRAZOLE MAGNESIUM 40 MG/1
40 CAPSULE, DELAYED RELEASE ORAL
COMMUNITY
Start: 2013-07-30

## 2024-02-19 RX ORDER — CHOLECALCIFEROL (VITAMIN D3) 50 MCG
TABLET ORAL
COMMUNITY
Start: 2020-06-05

## 2024-02-19 RX ORDER — METHYLPREDNISOLONE 4 MG/1
4 TABLET ORAL
COMMUNITY
Start: 2014-01-08

## 2024-02-19 RX ORDER — LEVOFLOXACIN 500 MG/1
500 TABLET, FILM COATED ORAL
COMMUNITY
Start: 2023-04-10

## 2024-02-19 RX ORDER — LIDOCAINE 50 MG/G
PATCH TOPICAL
COMMUNITY

## 2024-03-18 ENCOUNTER — OFFICE VISIT (OUTPATIENT)
Dept: PRIMARY CARE CLINIC | Age: 74
End: 2024-03-18
Payer: MEDICARE

## 2024-03-18 ENCOUNTER — OFFICE VISIT (OUTPATIENT)
Dept: PRIMARY CARE CLINIC | Age: 74
End: 2024-03-18

## 2024-03-18 VITALS
BODY MASS INDEX: 26.22 KG/M2 | DIASTOLIC BLOOD PRESSURE: 80 MMHG | HEART RATE: 55 BPM | OXYGEN SATURATION: 98 % | WEIGHT: 148 LBS | SYSTOLIC BLOOD PRESSURE: 118 MMHG

## 2024-03-18 DIAGNOSIS — F41.9 ANXIETY: ICD-10-CM

## 2024-03-18 DIAGNOSIS — I10 PRIMARY HYPERTENSION: ICD-10-CM

## 2024-03-18 DIAGNOSIS — R73.9 HYPERGLYCEMIA: ICD-10-CM

## 2024-03-18 DIAGNOSIS — F32.A DEPRESSIVE DISORDER: ICD-10-CM

## 2024-03-18 DIAGNOSIS — J30.1 NON-SEASONAL ALLERGIC RHINITIS DUE TO POLLEN: Primary | ICD-10-CM

## 2024-03-18 DIAGNOSIS — Z00.00 MEDICARE ANNUAL WELLNESS VISIT, SUBSEQUENT: Primary | ICD-10-CM

## 2024-03-18 DIAGNOSIS — R35.89 POLYURIA: ICD-10-CM

## 2024-03-18 DIAGNOSIS — E55.9 VITAMIN D DEFICIENCY: ICD-10-CM

## 2024-03-18 DIAGNOSIS — R19.7 DIARRHEA, UNSPECIFIED TYPE: ICD-10-CM

## 2024-03-18 DIAGNOSIS — E53.8 FOLIC ACID DEFICIENCY: ICD-10-CM

## 2024-03-18 DIAGNOSIS — E05.90 HYPERTHYROIDISM: ICD-10-CM

## 2024-03-18 PROCEDURE — 99214 OFFICE O/P EST MOD 30 MIN: CPT | Performed by: INTERNAL MEDICINE

## 2024-03-18 PROCEDURE — 1123F ACP DISCUSS/DSCN MKR DOCD: CPT | Performed by: INTERNAL MEDICINE

## 2024-03-18 RX ORDER — ERGOCALCIFEROL 1.25 MG/1
50000 CAPSULE ORAL WEEKLY
Qty: 12 CAPSULE | Refills: 1 | Status: SHIPPED | OUTPATIENT
Start: 2024-03-18

## 2024-03-18 RX ORDER — MONTELUKAST SODIUM 10 MG/1
10 TABLET ORAL NIGHTLY
Qty: 100 TABLET | Refills: 3 | Status: SHIPPED | OUTPATIENT
Start: 2024-03-18

## 2024-03-18 RX ORDER — LOSARTAN POTASSIUM 100 MG/1
100 TABLET ORAL DAILY
Qty: 90 TABLET | Refills: 3 | Status: SHIPPED | OUTPATIENT
Start: 2024-03-18

## 2024-03-18 RX ORDER — PAROXETINE 30 MG/1
30 TABLET, FILM COATED ORAL NIGHTLY
Qty: 90 TABLET | Refills: 3 | Status: SHIPPED | OUTPATIENT
Start: 2024-03-18

## 2024-03-18 RX ORDER — PROPRANOLOL HYDROCHLORIDE 80 MG/1
80 CAPSULE, EXTENDED RELEASE ORAL DAILY
Qty: 100 CAPSULE | Refills: 3 | Status: SHIPPED | OUTPATIENT
Start: 2024-03-18

## 2024-03-18 RX ORDER — FOLIC ACID 1 MG/1
1 TABLET ORAL DAILY
Qty: 90 TABLET | Refills: 3 | Status: SHIPPED | OUTPATIENT
Start: 2024-03-18

## 2024-03-18 ASSESSMENT — PATIENT HEALTH QUESTIONNAIRE - PHQ9
SUM OF ALL RESPONSES TO PHQ9 QUESTIONS 1 & 2: 0
SUM OF ALL RESPONSES TO PHQ QUESTIONS 1-9: 0
1. LITTLE INTEREST OR PLEASURE IN DOING THINGS: NOT AT ALL
7. TROUBLE CONCENTRATING ON THINGS, SUCH AS READING THE NEWSPAPER OR WATCHING TELEVISION: NOT AT ALL
10. IF YOU CHECKED OFF ANY PROBLEMS, HOW DIFFICULT HAVE THESE PROBLEMS MADE IT FOR YOU TO DO YOUR WORK, TAKE CARE OF THINGS AT HOME, OR GET ALONG WITH OTHER PEOPLE: NOT DIFFICULT AT ALL
2. FEELING DOWN, DEPRESSED OR HOPELESS: NOT AT ALL
SUM OF ALL RESPONSES TO PHQ QUESTIONS 1-9: 0
SUM OF ALL RESPONSES TO PHQ QUESTIONS 1-9: 0
4. FEELING TIRED OR HAVING LITTLE ENERGY: NOT AT ALL
5. POOR APPETITE OR OVEREATING: NOT AT ALL
3. TROUBLE FALLING OR STAYING ASLEEP: NOT AT ALL
6. FEELING BAD ABOUT YOURSELF - OR THAT YOU ARE A FAILURE OR HAVE LET YOURSELF OR YOUR FAMILY DOWN: NOT AT ALL
8. MOVING OR SPEAKING SO SLOWLY THAT OTHER PEOPLE COULD HAVE NOTICED. OR THE OPPOSITE, BEING SO FIGETY OR RESTLESS THAT YOU HAVE BEEN MOVING AROUND A LOT MORE THAN USUAL: NOT AT ALL
9. THOUGHTS THAT YOU WOULD BE BETTER OFF DEAD, OR OF HURTING YOURSELF: NOT AT ALL
SUM OF ALL RESPONSES TO PHQ QUESTIONS 1-9: 0

## 2024-03-18 ASSESSMENT — ENCOUNTER SYMPTOMS
FACIAL SWELLING: 0
ABDOMINAL DISTENTION: 0
DIARRHEA: 1
BLOOD IN STOOL: 0
APNEA: 0
CHOKING: 0
PHOTOPHOBIA: 0

## 2024-03-18 ASSESSMENT — LIFESTYLE VARIABLES
HOW MANY STANDARD DRINKS CONTAINING ALCOHOL DO YOU HAVE ON A TYPICAL DAY: PATIENT DOES NOT DRINK
HOW OFTEN DO YOU HAVE A DRINK CONTAINING ALCOHOL: NEVER

## 2024-03-18 NOTE — PROGRESS NOTES
Tawny Cary 74 y.o. female presents today with   Chief Complaint   Patient presents with    Diarrhea     Watery x1 week. Bloated and gasy    Incontinence       Diarrhea   This is a recurrent problem. The current episode started in the past 7 days. The problem occurs 2 to 4 times per day. The problem has been waxing and waning. The stool consistency is described as Watery. Associated symptoms include bloating. Pertinent negatives include no chills or fever.   Incontinence  This is a recurrent problem. The current episode started more than 1 month ago. The problem has been waxing and waning since onset.   Depression  Visit Type: follow-up  Patient presents with the following symptoms: anhedonia and irritability.  Patient is not experiencing: choking sensation, palpitations and suicidal ideas.  Frequency of symptoms: most days   Severity: mild     Anxiety  Presents for follow-up visit. Symptoms include irritability. Patient reports no chest pain, palpitations or suicidal ideas. Symptoms occur most days. The severity of symptoms is mild.       Hypertension  This is a chronic problem. The current episode started more than 1 year ago. The problem is unchanged. Associated symptoms include anxiety. Pertinent negatives include no chest pain or palpitations.       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 [facility]    Major depressive

## 2024-03-20 ENCOUNTER — TELEPHONE (OUTPATIENT)
Dept: PRIMARY CARE CLINIC | Age: 74
End: 2024-03-20

## 2024-03-20 NOTE — TELEPHONE ENCOUNTER
PA denied for Vitamin D2.  Medicare does not include vitamins and minerals under the part d coverage.  This is a benefit issue only.  She will just have to get otc vitamin d2, or pay for the medication at pharmacy out of pocket.

## 2024-03-21 ENCOUNTER — LAB (OUTPATIENT)
Dept: LAB | Facility: LAB | Age: 74
End: 2024-03-21
Payer: MEDICARE

## 2024-03-21 DIAGNOSIS — R19.7 DIARRHEA, UNSPECIFIED: Primary | ICD-10-CM

## 2024-03-21 DIAGNOSIS — R35.89 OTHER POLYURIA: ICD-10-CM

## 2024-03-21 LAB
ALBUMIN SERPL BCP-MCNC: 4.3 G/DL (ref 3.4–5)
ALP SERPL-CCNC: 92 U/L (ref 33–136)
ALT SERPL W P-5'-P-CCNC: 13 U/L (ref 7–45)
ANION GAP SERPL CALC-SCNC: 9 MMOL/L (ref 10–20)
AST SERPL W P-5'-P-CCNC: 19 U/L (ref 9–39)
BILIRUB SERPL-MCNC: 1 MG/DL (ref 0–1.2)
BUN SERPL-MCNC: 28 MG/DL (ref 6–23)
CALCIUM SERPL-MCNC: 11 MG/DL (ref 8.6–10.3)
CHLORIDE SERPL-SCNC: 103 MMOL/L (ref 98–107)
CO2 SERPL-SCNC: 32 MMOL/L (ref 21–32)
CREAT SERPL-MCNC: 0.75 MG/DL (ref 0.5–1.05)
EGFRCR SERPLBLD CKD-EPI 2021: 83 ML/MIN/1.73M*2
EST. AVERAGE GLUCOSE BLD GHB EST-MCNC: 88 MG/DL
GLUCOSE SERPL-MCNC: 76 MG/DL (ref 74–99)
HBA1C MFR BLD: 4.7 %
POTASSIUM SERPL-SCNC: 3.7 MMOL/L (ref 3.5–5.3)
PROT SERPL-MCNC: 6.6 G/DL (ref 6.4–8.2)
SODIUM SERPL-SCNC: 140 MMOL/L (ref 136–145)
TSH SERPL-ACNC: 2.56 MIU/L (ref 0.44–3.98)

## 2024-03-21 PROCEDURE — 84443 ASSAY THYROID STIM HORMONE: CPT

## 2024-03-21 PROCEDURE — 80053 COMPREHEN METABOLIC PANEL: CPT

## 2024-03-21 PROCEDURE — 83036 HEMOGLOBIN GLYCOSYLATED A1C: CPT | Mod: WAIVER OF LIABILITY ON FILE

## 2024-03-21 PROCEDURE — 36415 COLL VENOUS BLD VENIPUNCTURE: CPT

## 2024-03-22 ENCOUNTER — TELEPHONE (OUTPATIENT)
Dept: PRIMARY CARE CLINIC | Age: 74
End: 2024-03-22

## 2024-03-22 DIAGNOSIS — R35.89 POLYURIA: ICD-10-CM

## 2024-03-22 DIAGNOSIS — E05.90 HYPERTHYROIDISM: ICD-10-CM

## 2024-03-22 DIAGNOSIS — R73.9 HYPERGLYCEMIA: ICD-10-CM

## 2024-03-22 NOTE — TELEPHONE ENCOUNTER
Clary from  lab services is calling to notify you that her lab test- stool culture was cancelled due to collection error. Do you want to reorder this and have patient redo test? Her # is [phone]. Collection was put in a para pack instead of sterile cup.

## 2024-03-26 ENCOUNTER — LAB (OUTPATIENT)
Dept: LAB | Facility: LAB | Age: 74
End: 2024-03-26
Payer: MEDICARE

## 2024-03-26 PROCEDURE — 87493 C DIFF AMPLIFIED PROBE: CPT

## 2024-03-27 ENCOUNTER — TELEPHONE (OUTPATIENT)
Dept: PRIMARY CARE CLINIC | Age: 74
End: 2024-03-27

## 2024-03-27 DIAGNOSIS — R19.7 DIARRHEA, UNSPECIFIED TYPE: ICD-10-CM

## 2024-03-27 DIAGNOSIS — R19.7 DIARRHEA, UNSPECIFIED TYPE: Primary | ICD-10-CM

## 2024-03-27 LAB — C DIF TOX TCDA+TCDB STL QL NAA+PROBE: NOT DETECTED

## 2024-03-29 LAB
REASON FOR REJECTION: NORMAL
REJECTED TEST: NORMAL

## 2024-03-30 ENCOUNTER — APPOINTMENT (OUTPATIENT)
Dept: LAB | Facility: LAB | Age: 74
End: 2024-03-30
Payer: MEDICARE

## 2024-03-30 ENCOUNTER — LAB (OUTPATIENT)
Dept: LAB | Facility: LAB | Age: 74
End: 2024-03-30
Payer: MEDICARE

## 2024-03-30 DIAGNOSIS — R19.7 DIARRHEA, UNSPECIFIED: Primary | ICD-10-CM

## 2024-03-30 DIAGNOSIS — R19.7 DIARRHEA, UNSPECIFIED: ICD-10-CM

## 2024-03-30 PROCEDURE — 87507 IADNA-DNA/RNA PROBE TQ 12-25: CPT

## 2024-04-03 LAB — SCAN RESULT: NORMAL

## 2024-04-04 ENCOUNTER — TELEPHONE (OUTPATIENT)
Dept: PRIMARY CARE CLINIC | Age: 74
End: 2024-04-04

## 2024-04-04 DIAGNOSIS — R19.7 DIARRHEA, UNSPECIFIED TYPE: ICD-10-CM

## 2024-04-22 NOTE — ADDENDUM NOTE
Encounter addended by: Noble Ramirez on: 4/22/2024 1:57 PM   Actions taken: Imaging Exam begun, Imaging Exam ended

## 2024-05-29 NOTE — ADDENDUM NOTE
Encounter addended by: Noble Ramirez on: 5/29/2024 1:32 PM   Actions taken: Charge Capture section accepted
Encounter addended by: Noble Ramirez on: 5/8/2024 8:46 AM   Actions taken: Charge Capture section accepted
no

## 2024-06-18 ENCOUNTER — APPOINTMENT (OUTPATIENT)
Dept: CARDIOLOGY | Facility: CLINIC | Age: 74
End: 2024-06-18
Payer: COMMERCIAL

## 2024-06-18 VITALS
HEIGHT: 63 IN | BODY MASS INDEX: 25.87 KG/M2 | OXYGEN SATURATION: 95 % | SYSTOLIC BLOOD PRESSURE: 130 MMHG | DIASTOLIC BLOOD PRESSURE: 70 MMHG | WEIGHT: 146 LBS | HEART RATE: 70 BPM

## 2024-06-18 DIAGNOSIS — R00.2 PALPITATIONS: ICD-10-CM

## 2024-06-18 DIAGNOSIS — I10 BENIGN ESSENTIAL HYPERTENSION: Primary | ICD-10-CM

## 2024-06-18 DIAGNOSIS — R94.31 ABNORMAL EKG: ICD-10-CM

## 2024-06-18 PROCEDURE — 1036F TOBACCO NON-USER: CPT | Performed by: INTERNAL MEDICINE

## 2024-06-18 PROCEDURE — 3078F DIAST BP <80 MM HG: CPT | Performed by: INTERNAL MEDICINE

## 2024-06-18 PROCEDURE — 1159F MED LIST DOCD IN RCRD: CPT | Performed by: INTERNAL MEDICINE

## 2024-06-18 PROCEDURE — 1160F RVW MEDS BY RX/DR IN RCRD: CPT | Performed by: INTERNAL MEDICINE

## 2024-06-18 PROCEDURE — 99214 OFFICE O/P EST MOD 30 MIN: CPT | Performed by: INTERNAL MEDICINE

## 2024-06-18 PROCEDURE — 1126F AMNT PAIN NOTED NONE PRSNT: CPT | Performed by: INTERNAL MEDICINE

## 2024-06-18 PROCEDURE — 3075F SYST BP GE 130 - 139MM HG: CPT | Performed by: INTERNAL MEDICINE

## 2024-06-18 RX ORDER — LOSARTAN POTASSIUM 100 MG/1
100 TABLET ORAL DAILY
COMMUNITY

## 2024-06-18 RX ORDER — METOPROLOL TARTRATE 25 MG/1
25 TABLET, FILM COATED ORAL 2 TIMES DAILY
Qty: 60 TABLET | Refills: 11 | Status: SHIPPED | OUTPATIENT
Start: 2024-06-18 | End: 2025-06-18

## 2024-06-18 ASSESSMENT — ENCOUNTER SYMPTOMS
LOSS OF SENSATION IN FEET: 0
DEPRESSION: 1
OCCASIONAL FEELINGS OF UNSTEADINESS: 1

## 2024-06-18 ASSESSMENT — PAIN SCALES - GENERAL: PAINLEVEL: 0-NO PAIN

## 2024-06-18 NOTE — PROGRESS NOTES
Chief Complaint:   No chief complaint on file.     History Of Present Illness:    Bruna Hay is a 74 y.o. female with a history of abnormal ECG (poor R wave progression), hypothyroidism and palpitations here for follow-up.     Still with occasional palpitations.  They can happen for 2 or 3 days in a row and then not happen again for several weeks.  Typically occur when she is laying in bed.  Last several seconds and then subside.  Feels like it is taking her breath away.    Denies any exertional chest pain or shortness of breath.     Nuclear stress test 3/20/2020: No evidence of ischemia or scar. EF greater than 65%     Echocardiogram 3/20/2020: EF 55 to 60%. Grade 1 diastolic dysfunction. RVSP 33 mmHg.      Past Medical History:  She has a past medical history of Difficulty in walking, not elsewhere classified (10/29/2019) and Personal history of other specified conditions (10/29/2019).    Past Surgical History:  She has a past surgical history that includes US guided thyroid biopsy (10/5/2023).      Social History:  She reports that she has quit smoking. Her smoking use included cigarettes. She has never used smokeless tobacco. She reports that she does not drink alcohol and does not use drugs.    Family History:  No family history on file.     Allergies:  Meperidine, Meperidine (pf), Morphine, Oxycodone-acetaminophen, Oxymetazoline, and Pollen extracts    Outpatient Medications:  Current Outpatient Medications   Medication Instructions    azithromycin (ZITHROMAX) 250 mg    cholecalciferol (Vitamin D-3) 50 mcg (2,000 unit) capsule 1 capsule, Every 24 hours    cholecalciferol (Vitamin D-3) 50 MCG (2000 UT) tablet     diclofenac (VOLTAREN) 50 mg, oral, 2 times daily PRN, Do not crush, chew, or split.    esomeprazole (NEXIUM) 40 mg, oral, Daily RT    esomeprazole (NEXIUM) 20 mg, Every 24 hours    folic acid (FOLVITE) 1 mg, oral, Daily    levoFLOXacin (LEVAQUIN) 500 mg    lidocaine (Lidoderm) 5 % patch      "loratadine (CLARITIN) 10 mg, oral, Daily    losartan (COZAAR) 100 mg, oral, Daily    methIMAzole (TAPAZOLE) 2.5 mg, oral, 5 times weekly, Monday through Friday    methylPREDNISolone (MEDROL DOSPAK) 4 mg    montelukast (SINGULAIR) 10 mg, oral, Nightly    naproxen (NAPROSYN) 500 mg, oral, Every 12 hours PRN    pantoprazole (PROTONIX) 40 mg, oral, Daily RT    PARoxetine (PAXIL) 30 mg, oral, Nightly    propranolol XL (INNOPRAN XL) 80 mg, oral, Nightly, Do not crush, chew, or split.    venlafaxine XR (EFFEXOR-XR) 75 mg, oral, Daily RT       Last Recorded Vitals:  Visit Vitals  /70 (BP Location: Left arm, Patient Position: Sitting)   Pulse 70   Ht 1.6 m (5' 3\")   Wt 66.2 kg (146 lb)   SpO2 95%   BMI 25.86 kg/m²   Smoking Status Former   BSA 1.72 m²      LASTWT(3):   Wt Readings from Last 3 Encounters:   06/18/24 66.2 kg (146 lb)   10/08/23 66.8 kg (147 lb 4.3 oz)   06/19/23 66.2 kg (146 lb)       Physical Exam:  In general: alert and in no acute distress.   HEENT: Carotid upstrokes normal with no bruits. JVP is normal.   Pulmonary: Clear to auscultation bilaterally.  Cardiovascular: S1,S2, regular. No appreciable murmurs, rubs or gallops.   Lower extremities: Warm. 2+ distal pulses. No edema.       Last Labs:  CBC -  Recent Labs     10/08/23  0707 10/07/23  0430 10/06/23  0602   WBC 8.2 9.6 8.5   HGB 13.7 13.6 14.4   HCT 43.0 42.3 42.7    228 246   MCV 92 90 86       CMP -  Recent Labs     03/21/24  1212 10/08/23  0707 10/07/23  0430 10/06/23  2012    139 138 139   K 3.7 4.1 4.4 4.2    112* 110* 108*   CO2 32 19* 21 25   ANIONGAP 9* 12 11 10   BUN 28* 20 19 20   CREATININE 0.75 0.59 0.57 0.68   EGFR 83 >90 >90 >90   MG  --  2.34 2.34 2.00     Recent Labs     03/21/24  1212 10/08/23  0707 10/07/23  0430 10/06/23  0602 10/05/23  1925   ALBUMIN 4.3 3.2* 3.5   < > 4.2   ALKPHOS 92  --   --   --  83   ALT 13  --   --   --  10   AST 19  --   --   --  14   BILITOT 1.0  --   --   --  0.9    < > = values " "in this interval not displayed.       LIPID PANEL -   No results for input(s): \"CHOL\", \"LDLCALC\", \"LDLF\", \"HDL\", \"TRIG\" in the last 07194 hours.    Recent Labs     03/21/24  1212 07/19/21  1143   HGBA1C 4.7 4.7*           Assessment/Plan   1) hypertension: Blood pressure relatively controlled. No changes needed.     2) palpitations: Asked her to stop propranolol and try metoprolol tartrate 25 mg twice a day.  Call in a week or 2 with an update on how she is feeling and we can increase this dose as needed.     3) abnormal ECG: Continue to observe     4) follow-up: 1 year or sooner if needed         Carlos Michael MD  "

## 2024-06-27 DIAGNOSIS — E53.8 FOLIC ACID DEFICIENCY: ICD-10-CM

## 2024-06-27 DIAGNOSIS — I10 PRIMARY HYPERTENSION: ICD-10-CM

## 2024-06-27 DIAGNOSIS — F41.9 ANXIETY: ICD-10-CM

## 2024-06-27 DIAGNOSIS — F32.A DEPRESSIVE DISORDER: ICD-10-CM

## 2024-06-27 DIAGNOSIS — J30.1 NON-SEASONAL ALLERGIC RHINITIS DUE TO POLLEN: ICD-10-CM

## 2024-06-28 RX ORDER — PAROXETINE 30 MG/1
30 TABLET, FILM COATED ORAL NIGHTLY
Qty: 90 TABLET | Refills: 3 | Status: SHIPPED | OUTPATIENT
Start: 2024-06-28

## 2024-06-28 RX ORDER — LOSARTAN POTASSIUM 100 MG/1
100 TABLET ORAL DAILY
Qty: 90 TABLET | Refills: 3 | Status: SHIPPED | OUTPATIENT
Start: 2024-06-28

## 2024-06-28 RX ORDER — FOLIC ACID 1 MG/1
1 TABLET ORAL DAILY
Qty: 90 TABLET | Refills: 3 | Status: SHIPPED | OUTPATIENT
Start: 2024-06-28

## 2024-06-28 RX ORDER — MONTELUKAST SODIUM 10 MG/1
10 TABLET ORAL NIGHTLY
Qty: 100 TABLET | Refills: 3 | Status: SHIPPED | OUTPATIENT
Start: 2024-06-28

## 2024-07-01 ENCOUNTER — TELEPHONE (OUTPATIENT)
Dept: CARDIOLOGY | Facility: CLINIC | Age: 74
End: 2024-07-01
Payer: COMMERCIAL

## 2024-07-01 NOTE — TELEPHONE ENCOUNTER
Received refill request from [facility] for metoprolol. Per Dr. Michael's last note, patient was to call office on update on symptoms after switching to metoprolol at last office visit. Spoke to patient, who states palpitations have been less frequent and less intense since switching to metoprolol. Patient wishes to continue filling medication at [facility]. Anette Koehler RN

## 2025-04-09 ENCOUNTER — OFFICE VISIT (OUTPATIENT)
Dept: PRIMARY CARE CLINIC | Age: 75
End: 2025-04-09
Payer: COMMERCIAL

## 2025-04-09 VITALS
WEIGHT: 147 LBS | BODY MASS INDEX: 26.04 KG/M2 | SYSTOLIC BLOOD PRESSURE: 120 MMHG | OXYGEN SATURATION: 96 % | HEART RATE: 66 BPM | DIASTOLIC BLOOD PRESSURE: 76 MMHG

## 2025-04-09 DIAGNOSIS — E53.8 VITAMIN B 12 DEFICIENCY: ICD-10-CM

## 2025-04-09 DIAGNOSIS — E53.8 FOLATE DEFICIENCY: ICD-10-CM

## 2025-04-09 DIAGNOSIS — R73.03 PREDIABETES: ICD-10-CM

## 2025-04-09 DIAGNOSIS — E78.49 OTHER HYPERLIPIDEMIA: ICD-10-CM

## 2025-04-09 DIAGNOSIS — Z00.00 MEDICARE ANNUAL WELLNESS VISIT, SUBSEQUENT: Primary | ICD-10-CM

## 2025-04-09 DIAGNOSIS — I10 PRIMARY HYPERTENSION: ICD-10-CM

## 2025-04-09 DIAGNOSIS — R00.2 PALPITATIONS: ICD-10-CM

## 2025-04-09 DIAGNOSIS — E05.90 HYPERTHYROIDISM: ICD-10-CM

## 2025-04-09 PROCEDURE — 99213 OFFICE O/P EST LOW 20 MIN: CPT | Performed by: INTERNAL MEDICINE

## 2025-04-09 PROCEDURE — 3078F DIAST BP <80 MM HG: CPT | Performed by: INTERNAL MEDICINE

## 2025-04-09 PROCEDURE — G0439 PPPS, SUBSEQ VISIT: HCPCS | Performed by: INTERNAL MEDICINE

## 2025-04-09 PROCEDURE — 3074F SYST BP LT 130 MM HG: CPT | Performed by: INTERNAL MEDICINE

## 2025-04-09 PROCEDURE — 1123F ACP DISCUSS/DSCN MKR DOCD: CPT | Performed by: INTERNAL MEDICINE

## 2025-04-09 PROCEDURE — 1159F MED LIST DOCD IN RCRD: CPT | Performed by: INTERNAL MEDICINE

## 2025-04-09 RX ORDER — LOSARTAN POTASSIUM 100 MG/1
100 TABLET ORAL DAILY
Qty: 90 TABLET | Refills: 3 | Status: SHIPPED | OUTPATIENT
Start: 2025-04-09

## 2025-04-09 RX ORDER — PAROXETINE 20 MG/1
TABLET, FILM COATED ORAL
COMMUNITY
Start: 2025-04-04

## 2025-04-09 RX ORDER — METOPROLOL TARTRATE 25 MG/1
25 TABLET, FILM COATED ORAL 2 TIMES DAILY
Qty: 180 TABLET | Refills: 3 | Status: SHIPPED | OUTPATIENT
Start: 2025-04-09

## 2025-04-09 RX ORDER — METOPROLOL TARTRATE 25 MG/1
25 TABLET, FILM COATED ORAL 2 TIMES DAILY
COMMUNITY
End: 2025-04-09 | Stop reason: SDUPTHER

## 2025-04-09 SDOH — ECONOMIC STABILITY: FOOD INSECURITY: WITHIN THE PAST 12 MONTHS, THE FOOD YOU BOUGHT JUST DIDN'T LAST AND YOU DIDN'T HAVE MONEY TO GET MORE.: NEVER TRUE

## 2025-04-09 SDOH — ECONOMIC STABILITY: FOOD INSECURITY: WITHIN THE PAST 12 MONTHS, YOU WORRIED THAT YOUR FOOD WOULD RUN OUT BEFORE YOU GOT MONEY TO BUY MORE.: NEVER TRUE

## 2025-04-09 ASSESSMENT — PATIENT HEALTH QUESTIONNAIRE - PHQ9
SUM OF ALL RESPONSES TO PHQ QUESTIONS 1-9: 0
2. FEELING DOWN, DEPRESSED OR HOPELESS: NOT AT ALL
10. IF YOU CHECKED OFF ANY PROBLEMS, HOW DIFFICULT HAVE THESE PROBLEMS MADE IT FOR YOU TO DO YOUR WORK, TAKE CARE OF THINGS AT HOME, OR GET ALONG WITH OTHER PEOPLE: NOT DIFFICULT AT ALL
5. POOR APPETITE OR OVEREATING: NOT AT ALL
6. FEELING BAD ABOUT YOURSELF - OR THAT YOU ARE A FAILURE OR HAVE LET YOURSELF OR YOUR FAMILY DOWN: NOT AT ALL
8. MOVING OR SPEAKING SO SLOWLY THAT OTHER PEOPLE COULD HAVE NOTICED. OR THE OPPOSITE, BEING SO FIGETY OR RESTLESS THAT YOU HAVE BEEN MOVING AROUND A LOT MORE THAN USUAL: NOT AT ALL
3. TROUBLE FALLING OR STAYING ASLEEP: NOT AT ALL
SUM OF ALL RESPONSES TO PHQ QUESTIONS 1-9: 0
1. LITTLE INTEREST OR PLEASURE IN DOING THINGS: NOT AT ALL
SUM OF ALL RESPONSES TO PHQ QUESTIONS 1-9: 0
SUM OF ALL RESPONSES TO PHQ QUESTIONS 1-9: 0
4. FEELING TIRED OR HAVING LITTLE ENERGY: NOT AT ALL
7. TROUBLE CONCENTRATING ON THINGS, SUCH AS READING THE NEWSPAPER OR WATCHING TELEVISION: NOT AT ALL
9. THOUGHTS THAT YOU WOULD BE BETTER OFF DEAD, OR OF HURTING YOURSELF: NOT AT ALL

## 2025-04-09 NOTE — PROGRESS NOTES
Tawny Ryangerald 75 y.o. female presents today with   Chief Complaint   Patient presents with    Medicare AWV     Annual wellness visit  Hypertension  This is a chronic problem. The current episode started more than 1 year ago. The problem is unchanged. The problem is controlled. Associated symptoms include anxiety and palpitations. Pertinent negatives include no chest pain.       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 radiation to [facility] 2023    Major depressive disorder, recurrent, moderate 03/23/2023    Major depressive disorder, recurrent, unspecified 03/23/2023    Neck pain     Need for pneumococcal vaccination 06/05/2019    Osteoarthritis     Seasonal allergies 11/03/2016    Smoker     quit 2008, lobectomy left    SOB (shortness of breath) 06/18/2015    SOB (shortness of breath) 06/18/2015     Patient Active Problem List    Diagnosis Date Noted    Major depressive disorder, recurrent, mild 03/23/2023    Palpitations 03/12/2020    Hyponatremia 09/07/2019    Hypercalcemia 09/07/2019    Hypernatremia 09/07/2019    Muscle pain 09/07/2019    Ataxia 06/05/2019    Recurrent falls 06/05/2019    Bronchitis 05/24/2019    At high risk for falls 05/24/2019    Goiter, toxic, multinodular 11/23/2018    Thyroid nodule 10/25/2018    Bilateral inguinal hernia 07/13/2017    Seasonal allergies 11/03/2016    Depressive disorder 05/30/2013

## 2025-04-11 DIAGNOSIS — E05.90 HYPERTHYROIDISM: ICD-10-CM

## 2025-04-11 DIAGNOSIS — R00.2 PALPITATIONS: ICD-10-CM

## 2025-04-11 DIAGNOSIS — E78.49 OTHER HYPERLIPIDEMIA: ICD-10-CM

## 2025-04-11 DIAGNOSIS — I10 PRIMARY HYPERTENSION: ICD-10-CM

## 2025-04-11 DIAGNOSIS — R73.03 PREDIABETES: ICD-10-CM

## 2025-04-11 DIAGNOSIS — E53.8 FOLATE DEFICIENCY: ICD-10-CM

## 2025-04-11 LAB
ALBUMIN SERPL-MCNC: 4 G/DL (ref 3.5–4.6)
ALP SERPL-CCNC: 110 U/L (ref 40–130)
ALT SERPL-CCNC: 11 U/L (ref 0–33)
ANION GAP SERPL CALCULATED.3IONS-SCNC: 8 MEQ/L (ref 9–15)
AST SERPL-CCNC: 17 U/L (ref 0–35)
BASOPHILS # BLD: 0 K/UL (ref 0–0.2)
BASOPHILS NFR BLD: 0.5 %
BILIRUB SERPL-MCNC: 0.4 MG/DL (ref 0.2–0.7)
BUN SERPL-MCNC: 21 MG/DL (ref 8–23)
CALCIUM SERPL-MCNC: 10.6 MG/DL (ref 8.5–9.9)
CHLORIDE SERPL-SCNC: 106 MEQ/L (ref 95–107)
CHOLEST SERPL-MCNC: 200 MG/DL (ref 0–199)
CO2 SERPL-SCNC: 28 MEQ/L (ref 20–31)
CREAT SERPL-MCNC: 0.68 MG/DL (ref 0.5–0.9)
EOSINOPHIL # BLD: 0.5 K/UL (ref 0–0.7)
EOSINOPHIL NFR BLD: 6.3 %
ERYTHROCYTE [DISTWIDTH] IN BLOOD BY AUTOMATED COUNT: 13.3 % (ref 11.5–14.5)
ESTIMATED AVERAGE GLUCOSE: 88 MG/DL
FOLATE: 14.9 NG/ML (ref 4.8–24.2)
GLOBULIN SER CALC-MCNC: 2.4 G/DL (ref 2.3–3.5)
GLUCOSE SERPL-MCNC: 74 MG/DL (ref 70–99)
HBA1C MFR BLD: 4.7 % (ref 4–6)
HCT VFR BLD AUTO: 48 % (ref 37–47)
HDLC SERPL-MCNC: 46 MG/DL (ref 40–59)
HGB BLD-MCNC: 15.8 G/DL (ref 12–16)
LDLC SERPL CALC-MCNC: 127 MG/DL (ref 0–129)
LYMPHOCYTES # BLD: 2.1 K/UL (ref 1–4.8)
LYMPHOCYTES NFR BLD: 25.7 %
MCH RBC QN AUTO: 29.2 PG (ref 27–31.3)
MCHC RBC AUTO-ENTMCNC: 32.9 % (ref 33–37)
MCV RBC AUTO: 88.6 FL (ref 79.4–94.8)
MONOCYTES # BLD: 0.6 K/UL (ref 0.2–0.8)
MONOCYTES NFR BLD: 7.1 %
NEUTROPHILS # BLD: 4.9 K/UL (ref 1.4–6.5)
NEUTS SEG NFR BLD: 60.2 %
PLATELET # BLD AUTO: 242 K/UL (ref 130–400)
POTASSIUM SERPL-SCNC: 4.1 MEQ/L (ref 3.4–4.9)
PROT SERPL-MCNC: 6.4 G/DL (ref 6.3–8)
RBC # BLD AUTO: 5.42 M/UL (ref 4.2–5.4)
SODIUM SERPL-SCNC: 142 MEQ/L (ref 135–144)
T4 FREE SERPL-MCNC: 0.95 NG/DL (ref 0.84–1.68)
TRIGL SERPL-MCNC: 136 MG/DL (ref 0–150)
TSH REFLEX: 5.56 UIU/ML (ref 0.44–3.86)
VITAMIN B-12: 436 PG/ML (ref 232–1245)
WBC # BLD AUTO: 8.1 K/UL (ref 4.8–10.8)

## 2025-04-15 ENCOUNTER — RESULTS FOLLOW-UP (OUTPATIENT)
Dept: PRIMARY CARE CLINIC | Age: 75
End: 2025-04-15

## 2025-04-15 ASSESSMENT — ENCOUNTER SYMPTOMS
PHOTOPHOBIA: 0
CHOKING: 0
ABDOMINAL DISTENTION: 0
FACIAL SWELLING: 0
BLOOD IN STOOL: 0
APNEA: 0

## 2025-04-16 NOTE — PATIENT INSTRUCTIONS

## 2025-06-12 ENCOUNTER — APPOINTMENT (OUTPATIENT)
Dept: CARDIOLOGY | Facility: CLINIC | Age: 75
End: 2025-06-12
Payer: COMMERCIAL

## 2025-06-12 VITALS
SYSTOLIC BLOOD PRESSURE: 132 MMHG | WEIGHT: 147 LBS | HEART RATE: 58 BPM | DIASTOLIC BLOOD PRESSURE: 74 MMHG | OXYGEN SATURATION: 96 % | BODY MASS INDEX: 26.04 KG/M2

## 2025-06-12 DIAGNOSIS — E53.8 FOLIC ACID DEFICIENCY: ICD-10-CM

## 2025-06-12 DIAGNOSIS — R94.31 ABNORMAL EKG: Primary | ICD-10-CM

## 2025-06-12 DIAGNOSIS — I10 BENIGN ESSENTIAL HYPERTENSION: ICD-10-CM

## 2025-06-12 DIAGNOSIS — R00.2 PALPITATIONS: ICD-10-CM

## 2025-06-12 PROCEDURE — 93000 ELECTROCARDIOGRAM COMPLETE: CPT | Performed by: NURSE PRACTITIONER

## 2025-06-12 PROCEDURE — 99214 OFFICE O/P EST MOD 30 MIN: CPT | Performed by: NURSE PRACTITIONER

## 2025-06-12 PROCEDURE — 1159F MED LIST DOCD IN RCRD: CPT | Performed by: NURSE PRACTITIONER

## 2025-06-12 PROCEDURE — 3078F DIAST BP <80 MM HG: CPT | Performed by: NURSE PRACTITIONER

## 2025-06-12 PROCEDURE — 1036F TOBACCO NON-USER: CPT | Performed by: NURSE PRACTITIONER

## 2025-06-12 PROCEDURE — 3075F SYST BP GE 130 - 139MM HG: CPT | Performed by: NURSE PRACTITIONER

## 2025-06-12 RX ORDER — FOLIC ACID 1 MG/1
1000 TABLET ORAL DAILY
Qty: 90 TABLET | Refills: 3 | Status: SHIPPED | OUTPATIENT
Start: 2025-06-12

## 2025-06-12 NOTE — PROGRESS NOTES
"Name : Bruna Hay   : 1950   MRN : [id number]   ENC Date : 2025    Primary Cardiologist: Dr. Michael     CC: Annual Exam and Hypertension     HPI:    Bruna Hay is a 75 y.o. female with PMHx sig for abnormal ECG (poor R wave progression), hypothyroidism 2/2 multinodular goiter s/p iodine radiation & palpitations who presents today for annual cardiovascular follow up.     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 She had 2 tumors removed from her neck. Tells me that they had to remove the muscle & she underwent cobalt treatment.  Now her neck feels heavy/pain so she sees a neurologist.    She also had to have a LLL lobectomy     Tells me that her thyroid function has been normal & she has not needed Synthroid. Follows with Dr. John    From a cardiac perspective she has done well. Tells me that she has no more palpitations since switching from propranolol to metoprolol. Further denies any chest pain, pressure, SOB/VEGA, PND, orthopnea, LE edema, lightheadedness, dizziness, or syncope.     Exercise: Walks at the gym with her friend, and takes the \"[employer] [place]\" path once a week.    CV Diagnostics:  Nuclear stress test 3/20/2020: No evidence of ischemia or scar. EF greater than 65%     Echocardiogram 3/20/2020: EF 55 to 60%. Grade 1 diastolic dysfunction. RVSP 33 mmHg.     ROS: unless otherwise noted in the history of present illness, all other systems were reviewed and they are negative for complaints     Allergies:  Meperidine, Meperidine (pf), Morphine, Oxycodone-acetaminophen, Oxymetazoline, and Pollen extracts    Current Outpatient Medications   Medication Instructions    cholecalciferol (Vitamin D-3) 50 mcg (2,000 unit) capsule 1 capsule, Every 24 hours    cholecalciferol (Vitamin D-3) 50 MCG (2000 UT) tablet     diclofenac (VOLTAREN) 50 mg, 2 times daily PRN    esomeprazole (NEXIUM) 20 mg, As needed    folic acid (FOLVITE) 1 mg, Daily    " "loratadine (CLARITIN) 10 mg, Daily    losartan (COZAAR) 100 mg, Daily    metoprolol tartrate (LOPRESSOR) 25 mg, oral, 2 times daily    montelukast (SINGULAIR) 10 mg, Nightly    PARoxetine (PAXIL) 30 mg, Nightly        Last Labs:  CBC  Lab Results   Component Value Date    WBC 8.2 10/08/2023    HGB 13.7 10/08/2023    HCT 43.0 10/08/2023    MCV 92 10/08/2023     10/08/2023       CMP  Lab Results   Component Value Date    CALCIUM 11.0 (H) 03/21/2024    PHOS 2.9 10/08/2023    PROT 6.6 03/21/2024    ALBUMIN 4.3 03/21/2024    AST 19 03/21/2024    ALT 13 03/21/2024    ALKPHOS 92 03/21/2024    BILITOT 1.0 03/21/2024       BMP   Lab Results   Component Value Date     03/21/2024    K 3.7 03/21/2024     03/21/2024    CO2 32 03/21/2024    GLUCOSE 76 03/21/2024    BUN 28 (H) 03/21/2024    CREATININE 0.75 03/21/2024       LIPID PANEL   No results found for: \"CHOL\", \"TRIG\", \"HDL\", \"CHHDL\", \"LDLF\", \"VLDL\", \"NHDL\"    RENAL FUNCTION PANEL   Lab Results   Component Value Date    GLUCOSE 76 03/21/2024     03/21/2024    K 3.7 03/21/2024     03/21/2024    CO2 32 03/21/2024    ANIONGAP 9 (L) 03/21/2024    BUN 28 (H) 03/21/2024    CREATININE 0.75 03/21/2024    CALCIUM 11.0 (H) 03/21/2024    PHOS 2.9 10/08/2023    ALBUMIN 4.3 03/21/2024        Lab Results   Component Value Date    HGBA1C 4.7 03/21/2024     I have personally reviewed the above lab results: CBC, chemistry, other labs as you see listed & diagnostics, I have specifically listed the results of these tests above.    Last Recorded Vitals:  Vitals:    06/12/25 1129   BP: 132/74   BP Location: Left arm   Patient Position: Sitting   Pulse: 58   SpO2: 96%   Weight: 66.7 kg (147 lb)     Physical Exam:  On exam Ms. Bruna Hay appears her stated age, is alert and oriented x3, and in no acute distress. Her sclera are anicteric and her oropharynx has moist mucous membranes. Her neck is supple and without thyromegaly. The JVP is ~5 cm of water above the " right atrium. Her cardiac exam has regular rhythm, normal S1, S2. No S3/4. There are no murmurs. Her lungs are clear to auscultation bilaterally and there is no dullness to percussion. Her abdomen is soft, nontender with normoactive bowel sounds. There is no HJR. The extremities are warm and without edema. The skin is dry. There is no rash present. The distal pulses are 2-3+ in all four extremities. Her mood and affect are appropriate for todays encounter.     Assessment/Plan:  1) hypertension: /74 mmHg today. Nicely controlled on current regimen.       2) palpitations: No recurrence of palpitations after switch from propranolol to metoprolol. C/w metoprolol tartrate 25mg BID      3) abnormal ECG: poor r wave progression Continue to observe    Follow up with Dr. Michael in 1 year or sooner as needed     Tracy M Schwab, SINDI-CNP

## 2025-07-08 ENCOUNTER — TELEPHONE (OUTPATIENT)
Dept: PRIMARY CARE CLINIC | Age: 75
End: 2025-07-08

## 2025-07-08 DIAGNOSIS — Z12.11 COLON CANCER SCREENING: Primary | ICD-10-CM

## 2025-07-08 NOTE — TELEPHONE ENCOUNTER
Pt got a call from denise that she is due again for her cologuard check, and her last one was 7/15/22.  Can you please order cologuard for her, so they send it to her?

## 2025-07-22 DIAGNOSIS — J30.1 NON-SEASONAL ALLERGIC RHINITIS DUE TO POLLEN: ICD-10-CM

## 2025-07-22 DIAGNOSIS — F32.A DEPRESSIVE DISORDER: ICD-10-CM

## 2025-07-22 DIAGNOSIS — F41.9 ANXIETY: ICD-10-CM

## 2025-07-22 RX ORDER — MONTELUKAST SODIUM 10 MG/1
10 TABLET ORAL NIGHTLY
Qty: 100 TABLET | Refills: 3 | Status: SHIPPED | OUTPATIENT
Start: 2025-07-22

## 2025-07-22 RX ORDER — PAROXETINE 30 MG/1
30 TABLET, FILM COATED ORAL NIGHTLY
Qty: 100 TABLET | Refills: 3 | Status: SHIPPED | OUTPATIENT
Start: 2025-07-22

## 2025-07-25 LAB — NONINV COLON CA DNA+OCC BLD SCRN STL QL: NEGATIVE

## 2026-06-12 ENCOUNTER — APPOINTMENT (OUTPATIENT)
Dept: CARDIOLOGY | Facility: CLINIC | Age: 76
End: 2026-06-12
Payer: COMMERCIAL